# Patient Record
Sex: FEMALE | Race: WHITE | NOT HISPANIC OR LATINO | Employment: OTHER | URBAN - METROPOLITAN AREA
[De-identification: names, ages, dates, MRNs, and addresses within clinical notes are randomized per-mention and may not be internally consistent; named-entity substitution may affect disease eponyms.]

---

## 2017-07-07 ENCOUNTER — HOSPITAL ENCOUNTER (OUTPATIENT)
Dept: GENERAL RADIOLOGY | Facility: HOSPITAL | Age: 57
Discharge: HOME | End: 2017-07-07
Admitting: ORTHOPAEDIC SURGERY

## 2017-07-07 ENCOUNTER — APPOINTMENT (OUTPATIENT)
Dept: PREADMISSION TESTING | Facility: HOSPITAL | Age: 57
End: 2017-07-07

## 2017-07-07 VITALS
DIASTOLIC BLOOD PRESSURE: 68 MMHG | HEIGHT: 64 IN | TEMPERATURE: 97.5 F | HEART RATE: 63 BPM | RESPIRATION RATE: 20 BRPM | BODY MASS INDEX: 47.63 KG/M2 | WEIGHT: 279 LBS | OXYGEN SATURATION: 93 % | SYSTOLIC BLOOD PRESSURE: 110 MMHG

## 2017-07-07 LAB
ABO GROUP BLD: NORMAL
ALBUMIN SERPL-MCNC: 3.6 G/DL (ref 3.5–5.2)
ALBUMIN/GLOB SERPL: 1.3 G/DL
ALP SERPL-CCNC: 145 U/L (ref 39–117)
ALT SERPL W P-5'-P-CCNC: 22 U/L (ref 1–33)
ANION GAP SERPL CALCULATED.3IONS-SCNC: 11.1 MMOL/L
AST SERPL-CCNC: 23 U/L (ref 1–32)
BILIRUB SERPL-MCNC: 0.4 MG/DL (ref 0.1–1.2)
BILIRUB UR QL STRIP: NEGATIVE
BLD GP AB SCN SERPL QL: NEGATIVE
BUN BLD-MCNC: 16 MG/DL (ref 6–20)
BUN/CREAT SERPL: 15.8 (ref 7–25)
CALCIUM SPEC-SCNC: 9.3 MG/DL (ref 8.6–10.5)
CHLORIDE SERPL-SCNC: 102 MMOL/L (ref 98–107)
CLARITY UR: CLEAR
CO2 SERPL-SCNC: 26.9 MMOL/L (ref 22–29)
COLOR UR: YELLOW
CREAT BLD-MCNC: 1.01 MG/DL (ref 0.57–1)
DEPRECATED RDW RBC AUTO: 46.4 FL (ref 37–54)
ERYTHROCYTE [DISTWIDTH] IN BLOOD BY AUTOMATED COUNT: 14.6 % (ref 11.7–13)
GFR SERPL CREATININE-BSD FRML MDRD: 56 ML/MIN/1.73
GLOBULIN UR ELPH-MCNC: 2.8 GM/DL
GLUCOSE BLD-MCNC: 111 MG/DL (ref 65–99)
GLUCOSE UR STRIP-MCNC: NEGATIVE MG/DL
HCT VFR BLD AUTO: 38 % (ref 35.6–45.5)
HGB BLD-MCNC: 12.6 G/DL (ref 11.9–15.5)
HGB UR QL STRIP.AUTO: NEGATIVE
INR PPP: 1.1 (ref 0.9–1.1)
KETONES UR QL STRIP: NEGATIVE
LEUKOCYTE ESTERASE UR QL STRIP.AUTO: NEGATIVE
MCH RBC QN AUTO: 28.7 PG (ref 26.9–32)
MCHC RBC AUTO-ENTMCNC: 33.2 G/DL (ref 32.4–36.3)
MCV RBC AUTO: 86.6 FL (ref 80.5–98.2)
NITRITE UR QL STRIP: NEGATIVE
PH UR STRIP.AUTO: 5.5 [PH] (ref 5–8)
PLATELET # BLD AUTO: 264 10*3/MM3 (ref 140–500)
PMV BLD AUTO: 11.1 FL (ref 6–12)
POTASSIUM BLD-SCNC: 4.4 MMOL/L (ref 3.5–5.2)
PROT SERPL-MCNC: 6.4 G/DL (ref 6–8.5)
PROT UR QL STRIP: NEGATIVE
PROTHROMBIN TIME: 13.8 SECONDS (ref 11.7–14.2)
RBC # BLD AUTO: 4.39 10*6/MM3 (ref 3.9–5.2)
RH BLD: POSITIVE
SODIUM BLD-SCNC: 140 MMOL/L (ref 136–145)
SP GR UR STRIP: 1.02 (ref 1–1.03)
UROBILINOGEN UR QL STRIP: NORMAL
WBC NRBC COR # BLD: 4.68 10*3/MM3 (ref 4.5–10.7)

## 2017-07-07 PROCEDURE — 81003 URINALYSIS AUTO W/O SCOPE: CPT | Performed by: ORTHOPAEDIC SURGERY

## 2017-07-07 PROCEDURE — 36415 COLL VENOUS BLD VENIPUNCTURE: CPT

## 2017-07-07 PROCEDURE — 71020 HC CHEST PA AND LATERAL: CPT

## 2017-07-07 PROCEDURE — 86850 RBC ANTIBODY SCREEN: CPT | Performed by: ORTHOPAEDIC SURGERY

## 2017-07-07 PROCEDURE — 85027 COMPLETE CBC AUTOMATED: CPT | Performed by: ORTHOPAEDIC SURGERY

## 2017-07-07 PROCEDURE — 85610 PROTHROMBIN TIME: CPT | Performed by: ORTHOPAEDIC SURGERY

## 2017-07-07 PROCEDURE — 86900 BLOOD TYPING SEROLOGIC ABO: CPT | Performed by: ORTHOPAEDIC SURGERY

## 2017-07-07 PROCEDURE — 93005 ELECTROCARDIOGRAM TRACING: CPT

## 2017-07-07 PROCEDURE — 80053 COMPREHEN METABOLIC PANEL: CPT | Performed by: ORTHOPAEDIC SURGERY

## 2017-07-07 PROCEDURE — 93010 ELECTROCARDIOGRAM REPORT: CPT | Performed by: INTERNAL MEDICINE

## 2017-07-07 PROCEDURE — 86901 BLOOD TYPING SEROLOGIC RH(D): CPT | Performed by: ORTHOPAEDIC SURGERY

## 2017-07-07 RX ORDER — DULOXETIN HYDROCHLORIDE 60 MG/1
60 CAPSULE, DELAYED RELEASE ORAL EVERY MORNING
COMMUNITY

## 2017-07-07 RX ORDER — CHLORHEXIDINE GLUCONATE 500 MG/1
1 CLOTH TOPICAL 2 TIMES DAILY
COMMUNITY
Start: 2017-07-11 | End: 2017-07-15 | Stop reason: HOSPADM

## 2017-07-07 RX ORDER — DULOXETIN HYDROCHLORIDE 30 MG/1
30 CAPSULE, DELAYED RELEASE ORAL EVERY MORNING
COMMUNITY

## 2017-07-07 NOTE — DISCHARGE INSTRUCTIONS
Take the following medications the morning of surgery with a small sip of water:  ATENOLOL    TO BE CALLED WITH ARRIVAL TIME      General Instructions:  • Do not eat solid food after midnight the night before surgery.  • You may drink clear liquids day of surgery but must stop at least one hour before your hospital arrival time.  • It is beneficial for you to have a clear drink that contains carbohydrates the day of surgery.  We suggest a 20 ounce bottle of Gatorade or Powerade for non-diabetic patients or a 20 ounce bottle of G2 or Powerade Zero for diabetic patients. (Pediatric patients, are not advised to drink a 20 ounce carbohydrate drink)    Clear liquids are liquids you can see through.  Nothing red in color.     Plain water                               Sports drinks  Sodas                                   Gelatin (Jell-O)  Fruit juices without pulp such as white grape juice and apple juice  Popsicles that contain no fruit or yogurt  Tea or coffee (no cream or milk added)  Gatorade / Powerade  G2 / Powerade Zero    • Infants may have breast milk up to four hours before surgery.  • Infants drinking formula may drink formula up to six hours before surgery.   • Patients who avoid smoking, chewing tobacco and alcohol for 4 weeks prior to surgery have a reduced risk of post-operative complications.  Quit smoking as many days before surgery as you can.  • Do not smoke, use chewing tobacco or drink alcohol the day of surgery.   • If applicable bring your C-PAP/ BI-PAP machine.  • Bring any papers given to you in the doctor’s office.  • Wear clean comfortable clothes and socks.  • Do not wear contact lenses or make-up.  Bring a case for your glasses.   • Bring crutches or walker if applicable.  • Leave all other valuables and jewelry at home.  • The Pre-Admission Testing nurse will instruct you to bring medications if unable to obtain an accurate list in Pre-Admission Testing.        If you were given a blood bank  ID arm band remember to bring it with you the day of surgery.    Preventing a Surgical Site Infection:  • For 2 to 3 days before surgery, avoid shaving with a razor because the razor can irritate skin and make it easier to develop an infection.  • The night prior to surgery sleep in a clean bed with clean clothing.  Do not allow pets to sleep with you.  • Shower on the morning of surgery using a fresh bar of anti-bacterial soap (such as Dial) and clean washcloth.  Dry with a clean towel and dress in clean clothing.  • Ask your surgeon if you will be receiving antibiotics prior to surgery.  • Make sure you, your family, and all healthcare providers clean their hands with soap and water or an alcohol based hand  before caring for you or your wound.    Day of surgery:  Upon arrival, a Pre-op nurse and Anesthesiologist will review your health history, obtain vital signs, and answer questions you may have.  The only belongings needed at this time will be your home medications and if applicable your C-PAP/BI-PAP machine.  If you are staying overnight your family can leave the rest of your belongings in the car and bring them to your room later.  A Pre-op nurse will start an IV and you may receive medication in preparation for surgery, including something to help you relax.  Your family will be able to see you in the Pre-op area.  While you are in surgery your family should notify the waiting room  if they leave the waiting room area and provide a contact phone number.    Please be aware that surgery does come with discomfort.  We want to make every effort to control your discomfort so please discuss any uncontrolled symptoms with your nurse.   Your doctor will most likely have prescribed pain medications.      If you are going home after surgery you will receive individualized written care instructions before being discharged.  A responsible adult must drive you to and from the hospital on the day of  your surgery and stay with you for 24 hours.    If you are staying overnight following surgery, you will be transported to your hospital room following the recovery period.  [facility] has all private rooms.    If you have any questions please call Pre-Admission Testing at [phone].  Deductibles and co-payments are collected on the day of service. Please be prepared to pay the required co-pay, deductible or deposit on the day of service as defined by your plan.    2% CHLORAHEXIDINE GLUCONATE* CLOTH  Preparing or “prepping” skin before surgery can reduce the risk of infection at the surgical site. To make the process easier, [facility] has chosen disposable cloths moistened with a rinse-free, 2% Chlorhexidine Gluconate (CHG) antiseptic solution. The steps below outline the prepping process and should be carefully followed.        Use the prep cloth on the area that is circled in the diagram             Directions Night before Surgery  1) Shower using a fresh bar of anti-bacterial soap (such as Dial) and clean washcloth.  Use a clean towel to completely dry your skin.  2) Do not use any lotions, oils or creams on your skin.  3) Open the package and remove 1 cloth, wipe your skin for 30 seconds in a circular motion.  Allow to dry for 3 minutes.  4) Repeat #3 with second cloth.  5) Do not touch your eyes, ears, or mouth with the prep cloth.  6) Allow the wet prep solution to air dry.  7) Discard the prep cloth and wash your hands with soap and water.   8) Dress in clean bed clothes and sleep on fresh clean bed sheets.   9) You may experience some temporary itching after the prep.    Directions Day of Surgery  1) Repeat steps 1,2,3,4,5,6,7, and 9.   2) Dress in clean clothes before coming to the hospital.    BACTROBAN NASAL OINTMENT  There are many germs normally in your nose. Bactroban is an ointment that will help reduce these germs. Please follow these instructions for Bactroban  use:    ____Two days before surgery in the evening Date________    ____The day before surgery in the morning  Date________    ____The day before surgery in the evening              Date________    ____The day of surgery in the morning    Date________    **Squirt ½ package of Bactroban Ointment onto a cotton applicator and apply to inside of 1st nostril.  Squirt the remaining Bactroban and apply to the inside of the other nostril.

## 2017-07-11 RX ORDER — CEFAZOLIN SODIUM IN 0.9 % NACL 3 G/100 ML
3 INTRAVENOUS SOLUTION, PIGGYBACK (ML) INTRAVENOUS ONCE
Status: CANCELLED | OUTPATIENT
Start: 2017-07-12

## 2017-07-12 ENCOUNTER — ANESTHESIA (OUTPATIENT)
Dept: PERIOP | Facility: HOSPITAL | Age: 57
End: 2017-07-12

## 2017-07-12 ENCOUNTER — HOSPITAL ENCOUNTER (INPATIENT)
Facility: HOSPITAL | Age: 57
LOS: 3 days | Discharge: NURSING, REHAB OR LONG-TERM CARE | End: 2017-07-15
Attending: ORTHOPAEDIC SURGERY | Admitting: ORTHOPAEDIC SURGERY

## 2017-07-12 ENCOUNTER — ANESTHESIA EVENT (OUTPATIENT)
Dept: PERIOP | Facility: HOSPITAL | Age: 57
End: 2017-07-12

## 2017-07-12 DIAGNOSIS — R26.2 DIFFICULTY WALKING: Primary | ICD-10-CM

## 2017-07-12 PROBLEM — M17.9 OSTEOARTHRITIS OF KNEE: Status: ACTIVE | Noted: 2017-07-12

## 2017-07-12 LAB
HCT VFR BLD AUTO: 38.8 % (ref 35.6–45.5)
HGB BLD-MCNC: 12.3 G/DL (ref 11.9–15.5)

## 2017-07-12 PROCEDURE — 25010000002 CLONIDINE PER 1 MG: Performed by: ORTHOPAEDIC SURGERY

## 2017-07-12 PROCEDURE — 25010000003 CEFAZOLIN IN DEXTROSE 2-4 GM/100ML-% SOLUTION: Performed by: ORTHOPAEDIC SURGERY

## 2017-07-12 PROCEDURE — 25010000002 ROPIVACAINE PER 1 MG: Performed by: ORTHOPAEDIC SURGERY

## 2017-07-12 PROCEDURE — 25010000003 CEFAZOLIN IN DEXTROSE 2-4 GM/100ML-% SOLUTION: Performed by: NURSE ANESTHETIST, CERTIFIED REGISTERED

## 2017-07-12 PROCEDURE — 25010000002 NEOSTIGMINE PER 0.5 MG: Performed by: NURSE ANESTHETIST, CERTIFIED REGISTERED

## 2017-07-12 PROCEDURE — 85018 HEMOGLOBIN: CPT | Performed by: ORTHOPAEDIC SURGERY

## 2017-07-12 PROCEDURE — 25010000002 EPINEPHRINE PER 0.1 MG: Performed by: ORTHOPAEDIC SURGERY

## 2017-07-12 PROCEDURE — 85014 HEMATOCRIT: CPT | Performed by: ORTHOPAEDIC SURGERY

## 2017-07-12 PROCEDURE — 25010000002 MIDAZOLAM PER 1 MG: Performed by: ANESTHESIOLOGY

## 2017-07-12 PROCEDURE — 97110 THERAPEUTIC EXERCISES: CPT

## 2017-07-12 PROCEDURE — C1713 ANCHOR/SCREW BN/BN,TIS/BN: HCPCS | Performed by: ORTHOPAEDIC SURGERY

## 2017-07-12 PROCEDURE — 25010000002 FENTANYL CITRATE (PF) 100 MCG/2ML SOLUTION: Performed by: NURSE ANESTHETIST, CERTIFIED REGISTERED

## 2017-07-12 PROCEDURE — 25010000002 PROPOFOL 10 MG/ML EMULSION: Performed by: NURSE ANESTHETIST, CERTIFIED REGISTERED

## 2017-07-12 PROCEDURE — 94799 UNLISTED PULMONARY SVC/PX: CPT

## 2017-07-12 PROCEDURE — 25010000002 ONDANSETRON PER 1 MG: Performed by: NURSE ANESTHETIST, CERTIFIED REGISTERED

## 2017-07-12 PROCEDURE — 0SRD0J9 REPLACEMENT OF LEFT KNEE JOINT WITH SYNTHETIC SUBSTITUTE, CEMENTED, OPEN APPROACH: ICD-10-PCS | Performed by: ORTHOPAEDIC SURGERY

## 2017-07-12 PROCEDURE — 25010000002 HYDROMORPHONE PER 4 MG: Performed by: NURSE ANESTHETIST, CERTIFIED REGISTERED

## 2017-07-12 PROCEDURE — 25010000002 FENTANYL CITRATE (PF) 100 MCG/2ML SOLUTION: Performed by: ANESTHESIOLOGY

## 2017-07-12 PROCEDURE — 25010000002 DEXAMETHASONE PER 1 MG: Performed by: NURSE ANESTHETIST, CERTIFIED REGISTERED

## 2017-07-12 PROCEDURE — 97162 PT EVAL MOD COMPLEX 30 MIN: CPT

## 2017-07-12 PROCEDURE — 25010000002 KETOROLAC TROMETHAMINE PER 15 MG: Performed by: ORTHOPAEDIC SURGERY

## 2017-07-12 PROCEDURE — C1776 JOINT DEVICE (IMPLANTABLE): HCPCS | Performed by: ORTHOPAEDIC SURGERY

## 2017-07-12 PROCEDURE — 25010000002 PROMETHAZINE PER 50 MG: Performed by: NURSE ANESTHETIST, CERTIFIED REGISTERED

## 2017-07-12 DEVICE — IMPLANTABLE DEVICE: Type: IMPLANTABLE DEVICE | Status: FUNCTIONAL

## 2017-07-12 DEVICE — CMT BONE SIMPLEX HV FUL DOSE: Type: IMPLANTABLE DEVICE | Site: KNEE | Status: FUNCTIONAL

## 2017-07-12 DEVICE — LEGION CR HIGH FLEX XLPE SZ 3-4 10MM
Type: IMPLANTABLE DEVICE | Status: FUNCTIONAL
Brand: LEGION

## 2017-07-12 DEVICE — LEGION CRUCIATE RETAINING OXINIUM                                    FEMORAL SIZE 4 LEFT
Type: IMPLANTABLE DEVICE | Site: KNEE | Status: FUNCTIONAL
Brand: LEGION

## 2017-07-12 DEVICE — GEN II 7.5MM RESUR PAT 32MM
Type: IMPLANTABLE DEVICE | Site: KNEE | Status: FUNCTIONAL
Brand: GENESIS II

## 2017-07-12 DEVICE — GENESIS II NON-POROUS TIBIAL                                    BASEPLATE SIZE 3 LEFT
Type: IMPLANTABLE DEVICE | Site: KNEE | Status: FUNCTIONAL
Brand: GENESIS II

## 2017-07-12 RX ORDER — MIDAZOLAM HYDROCHLORIDE 1 MG/ML
1 INJECTION INTRAMUSCULAR; INTRAVENOUS
Status: DISCONTINUED | OUTPATIENT
Start: 2017-07-12 | End: 2017-07-12 | Stop reason: HOSPADM

## 2017-07-12 RX ORDER — SENNA AND DOCUSATE SODIUM 50; 8.6 MG/1; MG/1
2 TABLET, FILM COATED ORAL 2 TIMES DAILY
Status: DISCONTINUED | OUTPATIENT
Start: 2017-07-12 | End: 2017-07-15 | Stop reason: HOSPADM

## 2017-07-12 RX ORDER — SODIUM CHLORIDE, SODIUM LACTATE, POTASSIUM CHLORIDE, CALCIUM CHLORIDE 600; 310; 30; 20 MG/100ML; MG/100ML; MG/100ML; MG/100ML
9 INJECTION, SOLUTION INTRAVENOUS CONTINUOUS PRN
Status: DISCONTINUED | OUTPATIENT
Start: 2017-07-12 | End: 2017-07-12 | Stop reason: HOSPADM

## 2017-07-12 RX ORDER — LABETALOL HYDROCHLORIDE 5 MG/ML
5 INJECTION, SOLUTION INTRAVENOUS
Status: DISCONTINUED | OUTPATIENT
Start: 2017-07-12 | End: 2017-07-12 | Stop reason: HOSPADM

## 2017-07-12 RX ORDER — BISACODYL 5 MG/1
10 TABLET, DELAYED RELEASE ORAL DAILY PRN
Status: DISCONTINUED | OUTPATIENT
Start: 2017-07-12 | End: 2017-07-15 | Stop reason: HOSPADM

## 2017-07-12 RX ORDER — NALOXONE HCL 0.4 MG/ML
0.2 VIAL (ML) INJECTION AS NEEDED
Status: DISCONTINUED | OUTPATIENT
Start: 2017-07-12 | End: 2017-07-12 | Stop reason: HOSPADM

## 2017-07-12 RX ORDER — SODIUM CHLORIDE, SODIUM LACTATE, POTASSIUM CHLORIDE, CALCIUM CHLORIDE 600; 310; 30; 20 MG/100ML; MG/100ML; MG/100ML; MG/100ML
100 INJECTION, SOLUTION INTRAVENOUS CONTINUOUS
Status: DISCONTINUED | OUTPATIENT
Start: 2017-07-12 | End: 2017-07-15 | Stop reason: HOSPADM

## 2017-07-12 RX ORDER — EPHEDRINE SULFATE 50 MG/ML
5 INJECTION, SOLUTION INTRAVENOUS ONCE AS NEEDED
Status: DISCONTINUED | OUTPATIENT
Start: 2017-07-12 | End: 2017-07-12 | Stop reason: HOSPADM

## 2017-07-12 RX ORDER — ONDANSETRON 2 MG/ML
4 INJECTION INTRAMUSCULAR; INTRAVENOUS ONCE AS NEEDED
Status: COMPLETED | OUTPATIENT
Start: 2017-07-12 | End: 2017-07-12

## 2017-07-12 RX ORDER — GLYCOPYRROLATE 0.2 MG/ML
INJECTION INTRAMUSCULAR; INTRAVENOUS AS NEEDED
Status: DISCONTINUED | OUTPATIENT
Start: 2017-07-12 | End: 2017-07-12 | Stop reason: SURG

## 2017-07-12 RX ORDER — OXYCODONE AND ACETAMINOPHEN 7.5; 325 MG/1; MG/1
1 TABLET ORAL ONCE AS NEEDED
Status: DISCONTINUED | OUTPATIENT
Start: 2017-07-12 | End: 2017-07-12 | Stop reason: HOSPADM

## 2017-07-12 RX ORDER — TRANEXAMIC ACID 100 MG/ML
INJECTION, SOLUTION INTRAVENOUS AS NEEDED
Status: DISCONTINUED | OUTPATIENT
Start: 2017-07-12 | End: 2017-07-12 | Stop reason: SURG

## 2017-07-12 RX ORDER — PROMETHAZINE HYDROCHLORIDE 25 MG/ML
12.5 INJECTION, SOLUTION INTRAMUSCULAR; INTRAVENOUS ONCE AS NEEDED
Status: COMPLETED | OUTPATIENT
Start: 2017-07-12 | End: 2017-07-12

## 2017-07-12 RX ORDER — NALOXONE HCL 0.4 MG/ML
0.1 VIAL (ML) INJECTION
Status: DISCONTINUED | OUTPATIENT
Start: 2017-07-12 | End: 2017-07-15 | Stop reason: HOSPADM

## 2017-07-12 RX ORDER — OXYCODONE AND ACETAMINOPHEN 7.5; 325 MG/1; MG/1
2 TABLET ORAL EVERY 4 HOURS PRN
Status: DISCONTINUED | OUTPATIENT
Start: 2017-07-12 | End: 2017-07-15 | Stop reason: HOSPADM

## 2017-07-12 RX ORDER — PROMETHAZINE HYDROCHLORIDE 25 MG/1
12.5 TABLET ORAL ONCE AS NEEDED
Status: DISCONTINUED | OUTPATIENT
Start: 2017-07-12 | End: 2017-07-12 | Stop reason: HOSPADM

## 2017-07-12 RX ORDER — DOCUSATE SODIUM 100 MG/1
100 CAPSULE, LIQUID FILLED ORAL 2 TIMES DAILY PRN
Status: DISCONTINUED | OUTPATIENT
Start: 2017-07-12 | End: 2017-07-15 | Stop reason: HOSPADM

## 2017-07-12 RX ORDER — DULOXETIN HYDROCHLORIDE 30 MG/1
30 CAPSULE, DELAYED RELEASE ORAL DAILY
Status: DISCONTINUED | OUTPATIENT
Start: 2017-07-12 | End: 2017-07-15 | Stop reason: HOSPADM

## 2017-07-12 RX ORDER — WARFARIN SODIUM 5 MG/1
5 TABLET ORAL
Status: DISCONTINUED | OUTPATIENT
Start: 2017-07-12 | End: 2017-07-13

## 2017-07-12 RX ORDER — ONDANSETRON 2 MG/ML
4 INJECTION INTRAMUSCULAR; INTRAVENOUS EVERY 6 HOURS PRN
Status: DISCONTINUED | OUTPATIENT
Start: 2017-07-12 | End: 2017-07-15 | Stop reason: HOSPADM

## 2017-07-12 RX ORDER — DIPHENHYDRAMINE HCL 25 MG
25 CAPSULE ORAL EVERY 6 HOURS PRN
Status: DISCONTINUED | OUTPATIENT
Start: 2017-07-12 | End: 2017-07-15 | Stop reason: HOSPADM

## 2017-07-12 RX ORDER — DIPHENHYDRAMINE HYDROCHLORIDE 50 MG/ML
12.5 INJECTION INTRAMUSCULAR; INTRAVENOUS
Status: DISCONTINUED | OUTPATIENT
Start: 2017-07-12 | End: 2017-07-12 | Stop reason: HOSPADM

## 2017-07-12 RX ORDER — FENTANYL CITRATE 50 UG/ML
50 INJECTION, SOLUTION INTRAMUSCULAR; INTRAVENOUS
Status: DISCONTINUED | OUTPATIENT
Start: 2017-07-12 | End: 2017-07-12 | Stop reason: HOSPADM

## 2017-07-12 RX ORDER — DEXAMETHASONE SODIUM PHOSPHATE 10 MG/ML
INJECTION INTRAMUSCULAR; INTRAVENOUS AS NEEDED
Status: DISCONTINUED | OUTPATIENT
Start: 2017-07-12 | End: 2017-07-12 | Stop reason: SURG

## 2017-07-12 RX ORDER — HYDRALAZINE HYDROCHLORIDE 20 MG/ML
5 INJECTION INTRAMUSCULAR; INTRAVENOUS
Status: DISCONTINUED | OUTPATIENT
Start: 2017-07-12 | End: 2017-07-12 | Stop reason: HOSPADM

## 2017-07-12 RX ORDER — FAMOTIDINE 10 MG/ML
20 INJECTION, SOLUTION INTRAVENOUS ONCE
Status: COMPLETED | OUTPATIENT
Start: 2017-07-12 | End: 2017-07-12

## 2017-07-12 RX ORDER — FLUMAZENIL 0.1 MG/ML
0.2 INJECTION INTRAVENOUS AS NEEDED
Status: DISCONTINUED | OUTPATIENT
Start: 2017-07-12 | End: 2017-07-12 | Stop reason: HOSPADM

## 2017-07-12 RX ORDER — MAGNESIUM HYDROXIDE 1200 MG/15ML
LIQUID ORAL AS NEEDED
Status: DISCONTINUED | OUTPATIENT
Start: 2017-07-12 | End: 2017-07-12 | Stop reason: HOSPADM

## 2017-07-12 RX ORDER — HYDROCODONE BITARTRATE AND ACETAMINOPHEN 7.5; 325 MG/1; MG/1
1 TABLET ORAL ONCE AS NEEDED
Status: DISCONTINUED | OUTPATIENT
Start: 2017-07-12 | End: 2017-07-12 | Stop reason: HOSPADM

## 2017-07-12 RX ORDER — LEVOTHYROXINE SODIUM 175 UG/1
175 TABLET ORAL DAILY
Status: DISCONTINUED | OUTPATIENT
Start: 2017-07-12 | End: 2017-07-15 | Stop reason: HOSPADM

## 2017-07-12 RX ORDER — CEFAZOLIN SODIUM 2 G/100ML
2 INJECTION, SOLUTION INTRAVENOUS EVERY 8 HOURS
Status: COMPLETED | OUTPATIENT
Start: 2017-07-12 | End: 2017-07-13

## 2017-07-12 RX ORDER — LIDOCAINE HYDROCHLORIDE 20 MG/ML
INJECTION, SOLUTION INFILTRATION; PERINEURAL AS NEEDED
Status: DISCONTINUED | OUTPATIENT
Start: 2017-07-12 | End: 2017-07-12 | Stop reason: SURG

## 2017-07-12 RX ORDER — ATENOLOL 25 MG/1
25 TABLET ORAL DAILY
Status: DISCONTINUED | OUTPATIENT
Start: 2017-07-12 | End: 2017-07-15 | Stop reason: HOSPADM

## 2017-07-12 RX ORDER — MIDAZOLAM HYDROCHLORIDE 1 MG/ML
2 INJECTION INTRAMUSCULAR; INTRAVENOUS
Status: DISCONTINUED | OUTPATIENT
Start: 2017-07-12 | End: 2017-07-12 | Stop reason: HOSPADM

## 2017-07-12 RX ORDER — HYDROMORPHONE HYDROCHLORIDE 1 MG/ML
0.5 INJECTION, SOLUTION INTRAMUSCULAR; INTRAVENOUS; SUBCUTANEOUS
Status: DISCONTINUED | OUTPATIENT
Start: 2017-07-12 | End: 2017-07-12 | Stop reason: HOSPADM

## 2017-07-12 RX ORDER — DULOXETIN HYDROCHLORIDE 60 MG/1
60 CAPSULE, DELAYED RELEASE ORAL DAILY
Status: DISCONTINUED | OUTPATIENT
Start: 2017-07-12 | End: 2017-07-15 | Stop reason: HOSPADM

## 2017-07-12 RX ORDER — PROMETHAZINE HYDROCHLORIDE 25 MG/1
25 SUPPOSITORY RECTAL ONCE AS NEEDED
Status: COMPLETED | OUTPATIENT
Start: 2017-07-12 | End: 2017-07-12

## 2017-07-12 RX ORDER — PROMETHAZINE HYDROCHLORIDE 25 MG/1
25 TABLET ORAL ONCE AS NEEDED
Status: COMPLETED | OUTPATIENT
Start: 2017-07-12 | End: 2017-07-12

## 2017-07-12 RX ORDER — POVIDONE-IODINE 10 MG/G
OINTMENT TOPICAL AS NEEDED
Status: DISCONTINUED | OUTPATIENT
Start: 2017-07-12 | End: 2017-07-12 | Stop reason: HOSPADM

## 2017-07-12 RX ORDER — FENTANYL CITRATE 50 UG/ML
INJECTION, SOLUTION INTRAMUSCULAR; INTRAVENOUS AS NEEDED
Status: DISCONTINUED | OUTPATIENT
Start: 2017-07-12 | End: 2017-07-12 | Stop reason: SURG

## 2017-07-12 RX ORDER — CYCLOBENZAPRINE HCL 10 MG
10 TABLET ORAL NIGHTLY
COMMUNITY

## 2017-07-12 RX ORDER — ONDANSETRON 2 MG/ML
INJECTION INTRAMUSCULAR; INTRAVENOUS AS NEEDED
Status: DISCONTINUED | OUTPATIENT
Start: 2017-07-12 | End: 2017-07-12 | Stop reason: SURG

## 2017-07-12 RX ORDER — SODIUM CHLORIDE 0.9 % (FLUSH) 0.9 %
1-10 SYRINGE (ML) INJECTION AS NEEDED
Status: DISCONTINUED | OUTPATIENT
Start: 2017-07-12 | End: 2017-07-12 | Stop reason: HOSPADM

## 2017-07-12 RX ORDER — PROPOFOL 10 MG/ML
VIAL (ML) INTRAVENOUS AS NEEDED
Status: DISCONTINUED | OUTPATIENT
Start: 2017-07-12 | End: 2017-07-12 | Stop reason: SURG

## 2017-07-12 RX ORDER — ROCURONIUM BROMIDE 10 MG/ML
INJECTION, SOLUTION INTRAVENOUS AS NEEDED
Status: DISCONTINUED | OUTPATIENT
Start: 2017-07-12 | End: 2017-07-12 | Stop reason: SURG

## 2017-07-12 RX ORDER — CEFAZOLIN SODIUM 2 G/100ML
INJECTION, SOLUTION INTRAVENOUS AS NEEDED
Status: DISCONTINUED | OUTPATIENT
Start: 2017-07-12 | End: 2017-07-12 | Stop reason: SURG

## 2017-07-12 RX ORDER — GABAPENTIN 300 MG/1
300 CAPSULE ORAL NIGHTLY
COMMUNITY

## 2017-07-12 RX ORDER — HYDROMORPHONE HYDROCHLORIDE 1 MG/ML
0.5 INJECTION, SOLUTION INTRAMUSCULAR; INTRAVENOUS; SUBCUTANEOUS
Status: DISCONTINUED | OUTPATIENT
Start: 2017-07-12 | End: 2017-07-15 | Stop reason: HOSPADM

## 2017-07-12 RX ADMIN — EPINEPHRINE: 1 INJECTION PARENTERAL at 14:10

## 2017-07-12 RX ADMIN — DULOXETINE HYDROCHLORIDE 60 MG: 60 CAPSULE, DELAYED RELEASE ORAL at 14:08

## 2017-07-12 RX ADMIN — PROPOFOL 200 MG: 10 INJECTION, EMULSION INTRAVENOUS at 08:01

## 2017-07-12 RX ADMIN — PROPOFOL 50 MG: 10 INJECTION, EMULSION INTRAVENOUS at 09:01

## 2017-07-12 RX ADMIN — WARFARIN SODIUM 5 MG: 5 TABLET ORAL at 17:57

## 2017-07-12 RX ADMIN — OXYCODONE HYDROCHLORIDE AND ACETAMINOPHEN 2 TABLET: 7.5; 325 TABLET ORAL at 17:57

## 2017-07-12 RX ADMIN — FAMOTIDINE 20 MG: 10 INJECTION INTRAVENOUS at 07:15

## 2017-07-12 RX ADMIN — SODIUM CHLORIDE, POTASSIUM CHLORIDE, SODIUM LACTATE AND CALCIUM CHLORIDE: 600; 310; 30; 20 INJECTION, SOLUTION INTRAVENOUS at 07:59

## 2017-07-12 RX ADMIN — ROCURONIUM BROMIDE 40 MG: 10 INJECTION INTRAVENOUS at 08:01

## 2017-07-12 RX ADMIN — DULOXETINE HYDROCHLORIDE 30 MG: 30 CAPSULE, DELAYED RELEASE ORAL at 14:09

## 2017-07-12 RX ADMIN — ONDANSETRON 4 MG: 2 INJECTION INTRAMUSCULAR; INTRAVENOUS at 09:05

## 2017-07-12 RX ADMIN — FENTANYL CITRATE 50 MCG: 50 INJECTION INTRAMUSCULAR; INTRAVENOUS at 10:34

## 2017-07-12 RX ADMIN — LIDOCAINE HYDROCHLORIDE 60 MG: 20 INJECTION, SOLUTION INFILTRATION; PERINEURAL at 08:01

## 2017-07-12 RX ADMIN — NEOSTIGMINE METHYLSULFATE 3 MG: 1 INJECTION INTRAMUSCULAR; INTRAVENOUS; SUBCUTANEOUS at 09:29

## 2017-07-12 RX ADMIN — ONDANSETRON 4 MG: 2 INJECTION INTRAMUSCULAR; INTRAVENOUS at 10:17

## 2017-07-12 RX ADMIN — CEFAZOLIN SODIUM 3 G: 2 INJECTION, SOLUTION INTRAVENOUS at 08:06

## 2017-07-12 RX ADMIN — FENTANYL CITRATE 50 MCG: 50 INJECTION INTRAMUSCULAR; INTRAVENOUS at 07:15

## 2017-07-12 RX ADMIN — LEVOTHYROXINE SODIUM 175 MCG: 175 TABLET ORAL at 14:09

## 2017-07-12 RX ADMIN — HYDROMORPHONE HYDROCHLORIDE 0.5 MG: 1 INJECTION, SOLUTION INTRAMUSCULAR; INTRAVENOUS; SUBCUTANEOUS at 12:00

## 2017-07-12 RX ADMIN — GLYCOPYRROLATE 0.4 MG: 0.2 INJECTION INTRAMUSCULAR; INTRAVENOUS at 09:29

## 2017-07-12 RX ADMIN — FENTANYL CITRATE 50 MCG: 50 INJECTION INTRAMUSCULAR; INTRAVENOUS at 10:00

## 2017-07-12 RX ADMIN — DEXAMETHASONE SODIUM PHOSPHATE 8 MG: 10 INJECTION INTRAMUSCULAR; INTRAVENOUS at 08:21

## 2017-07-12 RX ADMIN — SODIUM CHLORIDE, POTASSIUM CHLORIDE, SODIUM LACTATE AND CALCIUM CHLORIDE: 600; 310; 30; 20 INJECTION, SOLUTION INTRAVENOUS at 09:20

## 2017-07-12 RX ADMIN — TRANEXAMIC ACID 1000 MG: 100 INJECTION, SOLUTION INTRAVENOUS at 08:48

## 2017-07-12 RX ADMIN — MIDAZOLAM 2 MG: 1 INJECTION INTRAMUSCULAR; INTRAVENOUS at 07:15

## 2017-07-12 RX ADMIN — FENTANYL CITRATE 50 MCG: 50 INJECTION, SOLUTION INTRAMUSCULAR; INTRAVENOUS at 08:59

## 2017-07-12 RX ADMIN — CEFAZOLIN SODIUM 2 G: 2 INJECTION, SOLUTION INTRAVENOUS at 23:59

## 2017-07-12 RX ADMIN — FENTANYL CITRATE 50 MCG: 50 INJECTION, SOLUTION INTRAMUSCULAR; INTRAVENOUS at 08:39

## 2017-07-12 RX ADMIN — OXYCODONE HYDROCHLORIDE AND ACETAMINOPHEN 2 TABLET: 7.5; 325 TABLET ORAL at 13:58

## 2017-07-12 RX ADMIN — DOCUSATE SODIUM -SENNOSIDES 2 TABLET: 50; 8.6 TABLET, COATED ORAL at 17:57

## 2017-07-12 RX ADMIN — OXYCODONE HYDROCHLORIDE AND ACETAMINOPHEN 2 TABLET: 7.5; 325 TABLET ORAL at 22:15

## 2017-07-12 RX ADMIN — CEFAZOLIN SODIUM 2 G: 2 INJECTION, SOLUTION INTRAVENOUS at 16:32

## 2017-07-12 RX ADMIN — FENTANYL CITRATE 100 MCG: 50 INJECTION, SOLUTION INTRAMUSCULAR; INTRAVENOUS at 08:01

## 2017-07-12 RX ADMIN — PROMETHAZINE HYDROCHLORIDE 5 MG: 25 INJECTION INTRAMUSCULAR; INTRAVENOUS at 10:59

## 2017-07-12 RX ADMIN — FENTANYL CITRATE 50 MCG: 50 INJECTION, SOLUTION INTRAMUSCULAR; INTRAVENOUS at 08:23

## 2017-07-12 NOTE — ANESTHESIA PREPROCEDURE EVALUATION
Anesthesia Evaluation     no history of anesthetic complications:         Airway   Mallampati: II  no difficulty expected  Dental - normal exam     Pulmonary - negative pulmonary ROS and normal exam   (-) COPD, asthma, sleep apnea, not a smoker    PE comment: nonlabored  Cardiovascular - negative cardio ROS and normal exam    Rhythm: regular  Rate: normal    (-) hypertension, valvular problems/murmurs, past MI, CAD, dysrhythmias, angina      Neuro/Psych  (-) seizures, TIA, CVA    ROS Comment: Spinal Cord Cysts --> back pain  GI/Hepatic/Renal/Endo    (+) morbid obesity, hypothyroidism,   (-) GERD, liver disease, diabetes, hyperthyroidism    ROS Comment: S/p gastric bypass      Musculoskeletal     (+) arthralgias, back pain,   Abdominal   (+) obese,    Substance History      OB/GYN          Other   (+) arthritis   history of cancer (breast CA)                                    Anesthesia Plan    ASA 3     general     intravenous induction   Anesthetic plan and risks discussed with patient.

## 2017-07-12 NOTE — PLAN OF CARE
Problem: Patient Care Overview (Adult)  Goal: Plan of Care Review  Outcome: Ongoing (interventions implemented as appropriate)    07/12/17 7503   Coping/Psychosocial Response Interventions   Plan Of Care Reviewed With patient   Patient Care Overview   Progress progress toward functional goals as expected   Outcome Evaluation   Outcome Summary/Follow up Plan Pt presents POD#0 L TKA and currently requires Min A sit<>stand and CGA for mbulation 50' with RW . Family reported gait better than prior to surgery aND PT W/O C/O PAIN. Will benefit from continued skilled PT to advance mobility for returen home at [facility]; has RW but needs 3-in one commode. Should be fine for return home with assist and HH PT services Will do a second walk this PM then gym tomorrow AM to address stairs.

## 2017-07-12 NOTE — PLAN OF CARE
"Problem: Patient Care Overview (Adult)  Goal: Plan of Care Review  Outcome: Ongoing (interventions implemented as appropriate)    Problem: Inpatient Physical Therapy  Goal: Bed Mobility Goal LTG- PT  Outcome: Ongoing (interventions implemented as appropriate)    07/12/17 1433   Bed Mobility PT LTG   Bed Mobility PT LTG, Date Established 07/12/17   Bed Mobility PT LTG, Time to Achieve 2 days   Bed Mobility PT LTG, Activity Type all bed mobility   Bed Mobility PT LTG, [place] Level independent       Goal: Transfer Training Goal 1 LTG- PT  Outcome: Ongoing (interventions implemented as appropriate)    07/12/17 1433   Transfer Training PT LTG   Transfer Training PT LTG, Date Established 07/12/17   Transfer Training PT LTG, Time to Achieve 3 days   Transfer Training PT LTG, Activity Type all transfers   Transfer Training PT LTG, [place] Level independent   Transfer Training PT LTG, Assist Device walker, rolling       Goal: Gait Training Goal LTG- PT  Outcome: Ongoing (interventions implemented as appropriate)    07/12/17 1433   Gait Training PT LTG   Gait Training Goal PT LTG, Date Established 07/12/17   Gait Training Goal PT LTG, Time to Achieve 3 days   Gait Training Goal PT LTG, [place] Level supervision required   Gait Training Goal PT LTG, Assist Device walker, rolling   Gait Training Goal PT LTG, Distance to Achieve 120       Goal: Stair Training Goal LTG- PT  Outcome: Ongoing (interventions implemented as appropriate)    07/12/17 1433   Stair Training PT LTG   Stair Training Goal PT LTG, Date Established 07/12/17   Stair Training Goal PT LTG, Time to Achieve 3 days   Stair Training Goal PT LTG, Number of Steps 2   Stair Training Goal PT LTG, [place] Level contact guard assist   Stair Training Goal PT LTG, Assist Device 1 handrail  (has \"handles\" to hold onto )       Goal: Range of Motion Goal LTG- PT  Outcome: Ongoing (interventions implemented as appropriate)    07/12/17 1433   Range of Motion " PT LTG   Range of Motion Goal PT LTG, Date Established 07/05/17   Range of Motion Goal PT LTG, Time to Achieve 3 days   Range fo Motion Goal PT LTG, Joint L knee   Range of Motion Goal PT LTG, AROM Measure 0-90

## 2017-07-12 NOTE — PLAN OF CARE
Problem: Patient Care Overview (Adult)  Goal: Plan of Care Review  Outcome: Ongoing (interventions implemented as appropriate)    07/12/17 1506   Coping/Psychosocial Response Interventions   Plan Of Care Reviewed With patient   Patient Care Overview   Progress progress toward functional goals as expected   Outcome Evaluation   Outcome Summary/Follow up Plan Pt improved in mobility this afternoon, S only bed mobility, CGA transfewrs and ambulation 120' with her Rollator. To gym tomorrow to work on stairs.

## 2017-07-12 NOTE — ANESTHESIA PROCEDURE NOTES
Airway  Urgency: elective    Airway not difficult    General Information and Staff    Patient location during procedure: OR  Anesthesiologist: OLGA FOSTER  CRNA: RED DAVALOS    Indications and Patient Condition  Indications for airway management: airway protection    Preoxygenated: yes  Mask difficulty assessment: 2 - vent by mask + OA or adjuvant +/- NMBA    Final Airway Details  Final airway type: endotracheal airway      Successful airway: ETT  Cuffed: yes   Successful intubation technique: direct laryngoscopy  Endotracheal tube insertion site: oral  Blade: Marino  Blade size: #2  ETT size: 7.0 mm  Cormack-Lehane Classification: grade IIa - partial view of glottis  Placement verified by: chest auscultation and capnometry   Measured from: lips  ETT to lips (cm): 21  Number of attempts at approach: 1    Additional Comments  Atraumatic, MOP to cuff, BSBE, no change to dentition, secured with tape

## 2017-07-12 NOTE — ANESTHESIA POSTPROCEDURE EVALUATION
Patient: Natalie Terrazas    Procedure Summary     Date Anesthesia Start Anesthesia Stop Room / Location    07/12/17 0759 0949  [facility] OR 11 / [facility]       Procedure Diagnosis Surgeon Provider    LT TOTAL KNEE ARTHROPLASTY (Left Knee) No diagnosis on file. MD Darrick Manzano MD          Anesthesia Type: general  Last vitals  /81 (07/12/17 1200)    Temp 36.8 °C (98.2 °F) (07/12/17 1200)    Pulse 70 (07/12/17 1200)   Resp 16 (07/12/17 1200)    SpO2 98 % (07/12/17 1200)      Post Anesthesia Care and Evaluation    Patient location during evaluation: PACU  Patient participation: complete - patient participated  Level of consciousness: awake and alert  Pain management: adequate  Airway patency: patent  Anesthetic complications: No anesthetic complications    Cardiovascular status: acceptable  Respiratory status: acceptable  Hydration status: acceptable

## 2017-07-12 NOTE — H&P
Patient Care Team:  Poonam Temple MD as PCP - General (Family Medicine)    Chief complaint left knee pain    Subjective patient has difficulty walking because of knee pain.    History of Present Illness This patient has severe arthritis of her left knee.  She has had pain for years.  The pain is progressively getting worse.  The pain now limits her ability to walk or stand for any period of time.  She has tried anti-inflammatories as well as injections with no relief of her pain.  She comes to the hospital today for a left total knee.    Review of Systems     Past Medical History:   Diagnosis Date   • Arthritis    • Disease of thyroid gland    • History of breast cancer 2013   • Left knee pain    • Spinal cord cysts    • Urinary incontinence      Past Surgical History:   Procedure Laterality Date   • BREAST LUMPECTOMY Right    • GASTRIC BYPASS       Family History   Problem Relation Age of Onset   • Malig Hyperthermia Neg Hx      Social History   Substance Use Topics   • Smoking status: Never Smoker   • Smokeless tobacco: Never Used   • Alcohol use No     Prescriptions Prior to Admission   Medication Sig Dispense Refill Last Dose   • atenolol (TENORMIN) 25 MG tablet Take 25 mg by mouth Daily.      • Chlorhexidine Gluconate Cloth 2 % pads Apply  topically. PREOP      • DULoxetine (CYMBALTA) 30 MG capsule Take 30 mg by mouth Daily.      • DULoxetine (CYMBALTA) 60 MG capsule Take 60 mg by mouth Daily.      • HYDROcodone-acetaminophen (NORCO)  MG per tablet Take 1 tablet by mouth Every 8 (Eight) Hours As Needed for Moderate Pain (4-6).      • ibuprofen (ADVIL,MOTRIN) 200 MG tablet Take 200 mg by mouth every 6 (six) hours as needed for mild pain (1-3).   7/5/2017   • levothyroxine (SYNTHROID, LEVOTHROID) 175 MCG tablet Take 175 mcg by mouth daily.      • mupirocin (BACTROBAN) 2 % nasal ointment into each nostril 2 (Two) Times a Day. PREOP        Allergies:  Naproxen and Other    Objective  patient walks with a  limp.    Vital Signs  Temp:  [98.3 °F (36.8 °C)] 98.3 °F (36.8 °C)  Heart Rate:  [64] 64  Resp:  [17] 17  BP: (141)/(78) 141/78    Physical Exam  this patient is alert and awake oriented ×3 and answers questions appropriately.    HEENT pupils are equal round and reactive to light and accommodation    Neck the neck is supple with no palpable masses    Heart.  Regular rate and rhythm with no gallops or murmurs.     Lungs.  The lungs are clear to auscultation    Abdomen.  The abdomen is soft with regular bowel sounds. No  Rebound or distention is noted.    Neuro.  Neuro exam is normal.    Vascular.  There are palpable pulses in all 4 extremities.    Orthopedic.  The left knee exam reveals a small effusion.  Ligaments are stable.  Range of motion of the left knee is full extension to flexion of 110.  No erythema is noted.                                        Results Review:   I reviewed the patient's new clinical results.      Assessment/Plan  osteoarthritis left knee and the plan is for left total knee replacement.    Active Problems:    * No active hospital problems. *      Assessment & Plan    I discussed the patients findings and my recommendations with patient    Rakesh Velasco MD  07/12/17  6:53 AM    Time:

## 2017-07-12 NOTE — ANESTHESIA PROCEDURE NOTES
Peripheral Block    Patient location during procedure: holding area  Start time: 7/12/2017 7:13 AM  Stop time: 7/12/2017 7:20 AM  Reason for block: at surgeon's request and post-op pain management  Performed by  Anesthesiologist: OLGA FOSTER  Preanesthetic Checklist  Completed: patient identified, site marked, surgical consent, pre-op evaluation, timeout performed, IV checked, risks and benefits discussed and monitors and equipment checked  Peripheral Block Prep:  Sterile barriers:cap, gloves and mask  Prep: ChloraPrep  Patient monitoring: blood pressure monitoring, continuous pulse oximetry and EKG  Peripheral Procedure  Sedation:yes  Guidance:ultrasound guided  Images:still images obtained    Laterality:left  Block Type:adductor canal block (Femoral Nerve at Adductor Canal)  Injection Technique:single-shot  Needle Type:short-bevel  Needle Gauge:21 G  ULTRASOUND INTERPRETATION.  Using ultrasound guidance a 21 G gauge needle was placed in close proximity to the femoral nerve, at which point, under ultrasound guidance anesthetic was injected in the area of the nerve and spread of the anesthesia was seen on ultrasound in close proximity thereto.  There were no abnormalities seen on ultrasound; a digital image was taken; and the patient tolerated the procedure with no complications.   Medications  Local Injected:ropivacaine 0.5% without epinephrine Local Amount Injected:30mL  Post Assessment  Injection Assessment: negative aspiration for heme, no paresthesia on injection and incremental injection  Patient Tolerance:comfortable throughout block  Complications:no

## 2017-07-12 NOTE — THERAPY TREATMENT NOTE
Acute Care - Physical Therapy Treatment Note  [facility]     Patient Name: Natalie Terrazas  : 1960  MRN: [id number]  Today's Date: 2017             Admit Date: 2017    Visit Dx:    ICD-10-CM ICD-9-CM   1. Difficulty walking R26.2 719.7     Patient Active Problem List   Diagnosis   • Osteoarthritis of knee               Adult Rehabilitation Note       17 1626          Rehab Assessment/Intervention    Discipline physical therapist  -DM      Document Type therapy note (daily note)  -DM      Subjective Information no complaints;agree to therapy  -DM      Patient Effort, Rehab Treatment good  -DM      Precautions/Limitations fall precautions  -DM      Recorded by [DM] Keerthi Garcia, PT      Pain Assessment    Pain Assessment 0-10  -DM      Pain Score 1  -DM      Pain Type Acute pain  -DM      Pain Location Knee  -DM      Pain Orientation Left  -DM      Pain Intervention(s) Ambulation/increased activity;Cold applied;MD notified (Comment);Elevated  -DM      Recorded by [DM] Keerthi Garcia, PT      Vision Assessment/Intervention    Visual Impairment WFL  -DM      Recorded by [DM] Keerthi Garcia, PT      Cognitive Assessment/Intervention    Current Cognitive/Communication Assessment functional  -DM      Orientation Status oriented x 4  -DM      Follows Commands/Answers Questions 100% of the time  -DM      Personal Safety WNL/WFL  -DM      Personal Safety Interventions fall prevention program maintained;gait belt;nonskid shoes/slippers when out of bed;supervised activity  -DM      Recorded by [DM] Keerthi Garcia, PT      Bed Mobility, Assessment/Treatment    Bed Mobility, Assistive Device bed rails  -DM      Bed Mob, Supine to Sit, [place] supervision required  -DM      Bed Mob, Sit to Supine, [place] supervision required  -DM      Recorded by [DM] Keerthi Garcia, PT      Transfer Assessment/Treatment    Transfers, Sit-Stand [place] contact guard assist  -DM      Transfers, Stand-Sit  [place] contact guard assist  -DM      Transfers, Sit-Stand-Sit, Assist Device rolling walker  -DM      Recorded by [DM] Keerthi Garcia, PT      Gait Assessment/Treatment    Gait, [place] Level contact guard assist;verbal cues required  -DM      Gait, Assistive Device rolling walker  -DM      Gait, Distance (Feet) 120  -DM      Gait, Comment [place] is PLOF  -DM      Recorded by [DM] Keerthi Garcia, PT      Positioning and Restraints    Pre-Treatment Position in bed  -DM      Post Treatment Position bed  -DM      In Bed notified nsg;fowlers;call light within reach;encouraged to call for assist;exit alarm on;LLE elevated   ice  -DM      Recorded by [DM] Keerthi Garcia, PT        User Key  (r) = Recorded By, (t) = Taken By, (c) = Cosigned By    Initials Name Effective Dates    DM Keerthi Garcia, PT 10/06/15 -                 IP PT Goals       07/12/17 1433          Bed Mobility PT LTG    Bed Mobility PT LTG, Date Established (P)  07/12/17  -DM      Bed Mobility PT LTG, Time to Achieve (P)  2 days  -DM      Bed Mobility PT LTG, Activity Type (P)  all bed mobility  -DM      Bed Mobility PT LTG, [place] Level (P)  independent  -DM      Transfer Training PT LTG    Transfer Training PT LTG, Date Established (P)  07/12/17  -DM      Transfer Training PT LTG, Time to Achieve (P)  3 days  -DM      Transfer Training PT LTG, Activity Type (P)  all transfers  -DM      Transfer Training PT LTG, [place] Level (P)  independent  -DM      Transfer Training PT LTG, Assist Device (P)  walker, rolling  -DM      Gait Training PT LTG    Gait Training Goal PT LTG, Date Established (P)  07/12/17  -DM      Gait Training Goal PT LTG, Time to Achieve (P)  3 days  -DM      Gait Training Goal PT LTG, [place] Level (P)  supervision required  -DM      Gait Training Goal PT LTG, Assist Device (P)  walker, rolling  -DM      Gait Training Goal PT LTG, Distance to Achieve (P)  120  -DM      Stair Training PT LTG    Stair  "Training Goal PT LTG, Date Established (P)  07/12/17  -DM      Stair Training Goal PT LTG, Time to Achieve (P)  3 days  -DM      Stair Training Goal PT LTG, Number of Steps (P)  2  -DM      Stair Training Goal PT LTG, [place] Level (P)  contact guard assist  -DM      Stair Training Goal PT LTG, Assist Device (P)  1 handrail   has \"handles\" to hold onto   -DM      Range of Motion PT LTG    Range of Motion Goal PT LTG, Date Established (P)  07/05/17  -DM      Range of Motion Goal PT LTG, Time to Achieve (P)  3 days  -DM      Range fo Motion Goal PT LTG, Joint (P)  L knee  -DM      Range of Motion Goal PT LTG, AROM Measure (P)  0-90  -DM        User Key  (r) = Recorded By, (t) = Taken By, (c) = Cosigned By    Initials Name Provider Type    JOE Garcia PT Physical Therapist          Physical Therapy Education     Title: PT OT SLP Therapies (Done)     Topic: Physical Therapy (Done)     Point: Mobility training (Done)    Learning Progress Summary    Learner Readiness Method Response Comment Documented by Status   Patient Acceptance E,D DU,NR  DM 07/12/17 1634 Done    Acceptance E,TB,D DU,NR  DM 07/12/17 1432 Done               Point: Home exercise program (Done)    Learning Progress Summary    Learner Readiness Method Response Comment Documented by Status   Patient Acceptance E,TB,D DU,NR  DM 07/12/17 1432 Done               Point: Body mechanics (Done)    Learning Progress Summary    Learner Readiness Method Response Comment Documented by Status   Patient Acceptance E,D DU,NR  DM 07/12/17 1634 Done    Acceptance E,TB,D DU,NR  DM 07/12/17 1432 Done               Point: Precautions (Done)    Learning Progress Summary    Learner Readiness Method Response Comment Documented by Status   Patient Acceptance E,D DU,NR  DM 07/12/17 1634 Done    Acceptance E,TB,D DU,NR  DM 07/12/17 1432 Done                      User Key     Initials Effective Dates Name Provider Type Discipline     10/06/15 -  Keerthi Garcia PT " Physical Therapist PT                    PT Recommendation and Plan  Anticipated Discharge Disposition: home with assist, home with home health  Planned Therapy Interventions: balance training, bed mobility training, gait training, home exercise program, patient/family education, ROM (Range of Motion), strengthening, stair training, transfer training  PT Frequency: 2 times/day  Plan of Care Review  Plan Of Care Reviewed With: patient  Progress: progress toward functional goals as expected  Outcome Summary/Follow up Plan: Pt improved in mobility this afternoon, S only bed mobility, CGA transfewrs and ambulation 120' with her Rollator. To gym tomorrow to work on stairs.          Outcome Measures       07/12/17 1600 07/12/17 1500       How much help from another person do you currently need...    Turning from your back to your side while in flat bed without using bedrails? 4  -DM 4  -DM     Moving from lying on back to sitting on the side of a flat bed without bedrails? 4  -DM 3  -DM     Moving to and from a bed to a chair (including a wheelchair)? 3  -DM 3  -DM     Standing up from a chair using your arms (e.g., wheelchair, bedside chair)? 3  -DM 3  -DM     Climbing 3-5 steps with a railing? 3  -DM 3  -DM     To walk in hospital room? 3  -DM 3  -DM     AM-PAC 6 Clicks Score 20  -DM 19  -DM     Functional Assessment    Outcome Measure Options AM-PAC 6 Clicks Basic Mobility (PT)  -DM AM-PAC 6 Clicks Basic Mobility (PT)  -DM       User Key  (r) = Recorded By, (t) = Taken By, (c) = Cosigned By    Initials Name Provider Type    DM Keerthi Garcia, PT Physical Therapist           Time Calculation:         PT Charges       07/12/17 1629 07/12/17 1532 07/12/17 1448    Time Calculation    Start Time 1619  -DM  1423  -DM    Stop Time 1629  -DM  1449  -DM    Time Calculation (min) 10 min  -DM  26 min  -DM    PT Received On 07/12/17  -DM  07/12/17  -DM    PT - Next Appointment 07/13/17  -DM 07/12/17  -DM 07/13/17  -DM    PT Goal  Re-Cert Due Date   07/15/17  -DM      User Key  (r) = Recorded By, (t) = Taken By, (c) = Cosigned By    Initials Name Provider Type    JOE Garcia, PT Physical Therapist          Therapy Charges for Today     Code Description Service Date Service Provider Modifiers Qty    29332074952 HC PT EVAL MOD COMPLEXITY 2 7/12/2017 Keerthi Garcia, PT GP 1    17254845004 HC PT THER SUPP EA 15 MIN 7/12/2017 Keerthi Garcia, PT GP 2    55525559670 HC PT THER PROC EA 15 MIN 7/12/2017 Keerthi Garcia, PT GP 2    56433300447 HC PT THER PROC EA 15 MIN 7/12/2017 Keerthi Garcia, PT GP 1    65997093897 HC PT THER SUPP EA 15 MIN 7/12/2017 Keerthi Garcia, PT GP 1          PT G-Codes  Outcome Measure Options: AM-PAC 6 Clicks Basic Mobility (PT)    Keerthi Garcia, PT  7/12/2017

## 2017-07-12 NOTE — THERAPY EVALUATION
Acute Care - Physical Therapy Initial Evaluation  [facility]     Patient Name: Natalie Terrazas  : 1960  MRN: [id number]  Today's Date: 2017                Admit Date: 2017     Visit Dx:    ICD-10-CM ICD-9-CM   1. Difficulty walking R26.2 719.7     Patient Active Problem List   Diagnosis   • Osteoarthritis of knee     Past Medical History:   Diagnosis Date   • Arthritis    • Disease of thyroid gland    • History of breast cancer    • Left knee pain    • Spinal cord cysts    • Urinary incontinence      Past Surgical History:   Procedure Laterality Date   • BREAST LUMPECTOMY Right    • GASTRIC BYPASS            PT ASSESSMENT (last 72 hours)      PT Evaluation       17 1423 17 0642    Rehab Evaluation    Document Type evaluation  -DM     Subjective Information no complaints;agree to therapy  -DM     Patient Effort, Rehab Treatment good  -DM     General Information    Patient Profile Review yes  -DM     General Observations supine, L knee aced, IV, O2  -DM     Pertinent History Of Current Problem POD#0 L TKA  -DM     Precautions/Limitations fall precautions  -DM     Prior Level of Function independent:  -DM     Equipment Currently Used at Home rollator   has used rollator x 6 years  -DM other (see comments)   rollator--walker  -SK    Plans/Goals Discussed With patient;agreed upon  -DM     Living Environment    Lives With  significant other   Vicente Wood  -SK    Living Arrangements  house  -SK    Home Accessibility  stairs to enter home  -SK    Number of Stairs to Enter Home  2  -SK    Stair Railings at Home  outside, present at both sides  -SK    Type of Financial/Environmental Concern  none  -SK    Transportation Available  car   pt. states she is goving to [facility] for rehab  -SK    Clinical Impression    Functional Level At Time Of Evaluation CGA/Yosef  -DM     Patient/Family Goals Statement PLOF  -DM     Criteria for Skilled Therapeutic Interventions Met yes;treatment indicated   -DM     Pathology/Pathophysiology Noted (Describe Specifically for Each System) musculoskeletal  -DM     Impairments Found (describe specific impairments) gait, locomotion, and balance  -DM     Functional Limitations in Following Categories (Describe Specific Limitations) self-care;home management;work;community/leisure;school  -DM     Rehab Potential good, to achieve stated therapy goals  -DM     Predicted Duration of Therapy Intervention (days/wks) 2 days  -DM     Pain Assessment    Pain Assessment 0-10  -DM     Pain Score 3  -DM     Pain Type Acute pain;Surgical pain  -DM     Pain Location Knee  -DM     Pain Orientation Left  -DM     Vision Assessment/Intervention    Visual Impairment WFL  -DM     Cognitive Assessment/Intervention    Current Cognitive/Communication Assessment functional  -DM     Orientation Status oriented x 4  -DM     Follows Commands/Answers Questions 100% of the time  -DM     Personal Safety WNL/WFL  -DM     Personal Safety Interventions fall prevention program maintained;gait belt;muscle strengthening facilitated;nonskid shoes/slippers when out of bed;supervised activity  -DM     ROM (Range of Motion)    General ROM Detail L knee 0-60 (aced)  -DM     MMT (Manual Muscle Testing)    General MMT Assessment no strength deficits identified  -DM     General MMT Assessment Detail generalized weaknbess noted post-op  -DM     Bed Mobility, Assessment/Treatment    Bed Mobility, Assistive Device bed rails  -DM     Bed Mob, Supine to Sit, [place] contact guard assist  -DM     Bed Mob, Sit to Supine, [place] not tested  -DM     Transfer Assessment/Treatment    Transfers, Sit-Stand [place] minimum assist (75% patient effort);verbal cues required;1 person + 1 person to manage equipment  -DM     Transfers, Stand-Sit [place] contact guard assist;verbal cues required  -DM     Transfers, Sit-Stand-Sit, Assist Device rolling walker  -DM     Transfer, Safety Issues balance decreased during  turns;step length decreased  -DM     Transfer, Impairments strength decreased;impaired balance  -DM     Gait Assessment/Treatment    Gait, [place] Level 1 person + 1 person to manage equipment;contact guard assist  -DM     Gait, Assistive Device rolling walker  -DM     Gait, Distance (Feet) 50  -DM     Gait, Safety Issues step length decreased  -DM     Gait, Impairments strength decreased;impaired balance  -DM     Gait, Comment family reported her gait is better than prior to sx  -DM     Motor Skills/Interventions    Additional Documentation Balance Skills Training (Group)  -DM     Balance Skills Training    Sitting-Level of Assistance Independent  -DM     Sitting-Balance Support Feet supported  -DM     Standing-Level of Assistance Contact guard;x2  -DM     Static Standing Balance Support assistive device  -DM     Gait Balance-Level of Assistance Contact guard  -DM     Gait Balance Support assistive device  -DM     Therapy Exercises    Exercise Protocols total knee  -DM     Total Knee Exercises left:;10 reps;completed protocol  -DM     Positioning and Restraints    Pre-Treatment Position in bed  -DM     Post Treatment Position chair  -DM     In Chair notified nsg;reclined;call light within reach;encouraged to call for assist;exit alarm on;with family/caregiver;legs elevated;LLE elevated  -DM       User Key  (r) = Recorded By, (t) = Taken By, (c) = Cosigned By    Initials Name Provider Type    DM Keerthi Garcia, PT Physical Therapist    SON Hector, RN Registered Nurse          Physical Therapy Education     Title: PT OT SLP Therapies (Done)     Topic: Physical Therapy (Done)     Point: Mobility training (Done)    Learning Progress Summary    Learner Readiness Method Response Comment Documented by Status   Patient Acceptance E,TB,D SJ,NR  DM 07/12/17 7822 Done               Point: Home exercise program (Done)    Learning Progress Summary    Learner Readiness Method Response Comment Documented by Status    Patient Acceptance E,TB,D DU,NR  DM 07/12/17 1432 Done               Point: Body mechanics (Done)    Learning Progress Summary    Learner Readiness Method Response Comment Documented by Status   Patient Acceptance E,TB,D DU,NR  DM 07/12/17 1432 Done               Point: Precautions (Done)    Learning Progress Summary    Learner Readiness Method Response Comment Documented by Status   Patient Acceptance E,TB,D DU,NR  DM 07/12/17 1432 Done                      User Key     Initials Effective Dates Name Provider Type Discipline     10/06/15 -  Keerthi Garcia, PT Physical Therapist PT                PT Recommendation and Plan  Anticipated Discharge Disposition: home with assist, home with home health  Planned Therapy Interventions: balance training, bed mobility training, gait training, home exercise program, patient/family education, ROM (Range of Motion), strengthening, stair training, transfer training  PT Frequency: 2 times/day  Plan of Care Review  Plan Of Care Reviewed With: patient  Progress: progress toward functional goals as expected  Outcome Summary/Follow up Plan: Pt presents POD#0 L TKA and currently requires Min A sit<>stand and CGA for mbulation 50' with RW . Family reported gait better than prior to surgery aND PT W/O C/O PAIN. Will benefit from continued skilled PT to advance mobility for returen home at [facility]; has RW but needs 3-in one commode. Should be fine for return home with assist and  PT services Will do a second walk this PM then gym tomorrow AM to address stauirs..g          IP PT Goals       07/12/17 1433          Bed Mobility PT LTG    Bed Mobility PT LTG, Date Established (P)  07/12/17  -DM      Bed Mobility PT LTG, Time to Achieve (P)  2 days  -DM      Bed Mobility PT LTG, Activity Type (P)  all bed mobility  -DM      Bed Mobility PT LTG, [place] Level (P)  independent  -DM      Transfer Training PT LTG    Transfer Training PT LTG, Date Established (P)  07/12/17  -DM      Transfer  "Training PT LTG, Time to Achieve (P)  3 days  -DM      Transfer Training PT LTG, Activity Type (P)  all transfers  -DM      Transfer Training PT LTG, [place] Level (P)  independent  -DM      Transfer Training PT LTG, Assist Device (P)  walker, rolling  -DM      Gait Training PT LTG    Gait Training Goal PT LTG, Date Established (P)  07/12/17  -DM      Gait Training Goal PT LTG, Time to Achieve (P)  3 days  -DM      Gait Training Goal PT LTG, [place] Level (P)  supervision required  -DM      Gait Training Goal PT LTG, Assist Device (P)  walker, rolling  -DM      Gait Training Goal PT LTG, Distance to Achieve (P)  120  -DM      Stair Training PT LTG    Stair Training Goal PT LTG, Date Established (P)  07/12/17  -DM      Stair Training Goal PT LTG, Time to Achieve (P)  3 days  -DM      Stair Training Goal PT LTG, Number of Steps (P)  2  -DM      Stair Training Goal PT LTG, [place] Level (P)  contact guard assist  -DM      Stair Training Goal PT LTG, Assist Device (P)  1 handrail   has \"handles\" to hold onto   -DM      Range of Motion PT LTG    Range of Motion Goal PT LTG, Date Established (P)  07/05/17  -DM      Range of Motion Goal PT LTG, Time to Achieve (P)  3 days  -DM      Range fo Motion Goal PT LTG, Joint (P)  L knee  -DM      Range of Motion Goal PT LTG, AROM Measure (P)  0-90  -DM        User Key  (r) = Recorded By, (t) = Taken By, (c) = Cosigned By    Initials Name Provider Type    JOE Garcia, PT Physical Therapist                Outcome Measures       07/12/17 1500          How much help from another person do you currently need...    Turning from your back to your side while in flat bed without using bedrails? 4  -DM      Moving from lying on back to sitting on the side of a flat bed without bedrails? 3  -DM      Moving to and from a bed to a chair (including a wheelchair)? 3  -DM      Standing up from a chair using your arms (e.g., wheelchair, bedside chair)? 3  -DM      Climbing 3-5 " steps with a railing? 3  -DM      To walk in hospital room? 3  -DM      AM-PAC 6 Clicks Score 19  -DM      Functional Assessment    Outcome Measure Options AM-PAC 6 Clicks Basic Mobility (PT)  -DM        User Key  (r) = Recorded By, (t) = Taken By, (c) = Cosigned By    Initials Name Provider Type    JOE Garcia PT Physical Therapist           Time Calculation:         PT Charges       07/12/17 1448          Time Calculation    Start Time 1423  -DM      Stop Time 1449  -DM      Time Calculation (min) 26 min  -DM      PT Received On 07/12/17  -DM      PT - Next Appointment 07/13/17  -DM      PT Goal Re-Cert Due Date 07/15/17  -DM        User Key  (r) = Recorded By, (t) = Taken By, (c) = Cosigned By    Initials Name Provider Type    JOE Garcia PT Physical Therapist          Therapy Charges for Today     Code Description Service Date Service Provider Modifiers Qty    55658192413 HC PT EVAL MOD COMPLEXITY 2 7/12/2017 Keerthi Garcia, PT GP 1    73002917816 HC PT THER SUPP EA 15 MIN 7/12/2017 Keerthi Garcia, PT GP 2    61132292832 HC PT THER PROC EA 15 MIN 7/12/2017 Keerthi Garcia, PT GP 2          PT G-Codes  Outcome Measure Options: AM-PAC 6 Clicks Basic Mobility (PT)      Keerthi Garcia, PT  7/12/2017

## 2017-07-12 NOTE — PROGRESS NOTES
Discharge Planning Assessment  [facility]     Patient Name: Natalie Terrazas  MRN: [id number]  Today's Date: 7/12/2017    Admit Date: 7/12/2017          Discharge Needs Assessment       07/12/17 1528    Living Environment    Lives With significant other    Living Arrangements house    Home Accessibility stairs to enter home    Number of Stairs to Enter Home 2    Stair Railings at Home outside, present at both sides    Type of Financial/Environmental Concern none    Transportation Available car;ambulance;family or friend will provide    Living Environment    Quality Of Family Relationships supportive    Able to Return to Prior Living Arrangements yes    Discharge Needs Assessment    Concerns To Be Addressed no discharge needs identified    Readmission Within The Last 30 Days no previous admission in last 30 days    Equipment Currently Used at Home rollator    Discharge Disposition still a patient            Discharge Plan       07/12/17 1529    Case Management/Social Work Plan    Plan [facility]    Patient/Family In Agreement With Plan yes    Additional Comments IMM letter signed. Facesheet verified.  Spoke with patient in room.  Introduced self and explained role.  Patient is pre-registered at [place].  Spoke with [facility] and they will have a bed on Saturday. Transfer packet in [facility].         Discharge Placement     Facility/Agency Request Status Selected? Address Phone Number Fax Number    [facility] Accepted    Yes [address] [address] [phone]                Demographic Summary       07/12/17 1527    Referral Information    Admission Type inpatient    Arrived From admitted as an inpatient    Referral Source admission list    Reason For Consult discharge planning    Primary Care Physician Information    Name Dr Poonam Temple            Functional Status       07/12/17 1527    Functional Status Current    Ambulation 3-->assistive equipment and person     Transferring 3-->assistive equipment and person    Toileting 3-->assistive equipment and person    Bathing 3-->assistive equipment and person    Dressing 0-->independent    Eating 0-->independent    Communication 0-->understands/communicates without difficulty    Swallowing (if score 2 or more for any item, consult Rehab Services) 0-->swallows foods/liquids without difficulty    Change in Functional Status Since Onset of Current Illness/Injury yes    Functional Status Prior    Eating 0-->independent    Communication 0-->understands/communicates without difficulty    Swallowing 0-->swallows foods/liquids without difficulty    Cognitive/Perceptual/Developmental    Current Mental Status/Cognitive Functioning no deficits noted            Psychosocial     None            Abuse/Neglect     None            Legal     None            Substance Abuse     None            Patient Forms     None          Maria Luisa Lawson, RN

## 2017-07-12 NOTE — PLAN OF CARE
Problem: Patient Care Overview (Adult)  Goal: Plan of Care Review  Outcome: Ongoing (interventions implemented as appropriate)    07/12/17 9930   Coping/Psychosocial Response Interventions   Plan Of Care Reviewed With patient   Patient Care Overview   Progress progress toward functional goals as expected   Outcome Evaluation   Outcome Summary/Follow up Plan VSS, PAIN WELL CONTROLLED WITH PO MEDS, AMBULATES/VOIDS PBR WITH WALKER, NO COMORBIDITIES TO REPORT       Goal: Adult Individualization and Mutuality  Outcome: Ongoing (interventions implemented as appropriate)    Problem: Perioperative Period (Adult)  Goal: Signs and Symptoms of Listed Potential Problems Will be Absent or Manageable (Perioperative Period)  Outcome: Ongoing (interventions implemented as appropriate)    Problem: Fall Risk (Adult)  Goal: Identify Related Risk Factors and Signs and Symptoms  Outcome: Ongoing (interventions implemented as appropriate)  Goal: Absence of Falls  Outcome: Ongoing (interventions implemented as appropriate)    Problem: Knee Replacement, Total (Adult)  Goal: Signs and Symptoms of Listed Potential Problems Will be Absent or Manageable (Knee Replacement, Total)  Outcome: Ongoing (interventions implemented as appropriate)

## 2017-07-13 LAB
ANION GAP SERPL CALCULATED.3IONS-SCNC: 12.9 MMOL/L
BUN BLD-MCNC: 15 MG/DL (ref 6–20)
BUN/CREAT SERPL: 14 (ref 7–25)
CALCIUM SPEC-SCNC: 8.2 MG/DL (ref 8.6–10.5)
CHLORIDE SERPL-SCNC: 98 MMOL/L (ref 98–107)
CO2 SERPL-SCNC: 23.1 MMOL/L (ref 22–29)
CREAT BLD-MCNC: 1.07 MG/DL (ref 0.57–1)
GFR SERPL CREATININE-BSD FRML MDRD: 53 ML/MIN/1.73
GLUCOSE BLD-MCNC: 103 MG/DL (ref 65–99)
HCT VFR BLD AUTO: 33.1 % (ref 35.6–45.5)
HGB BLD-MCNC: 10.4 G/DL (ref 11.9–15.5)
INR PPP: 1.41 (ref 0.9–1.1)
POTASSIUM BLD-SCNC: 4.4 MMOL/L (ref 3.5–5.2)
PROTHROMBIN TIME: 16.8 SECONDS (ref 11.7–14.2)
SODIUM BLD-SCNC: 134 MMOL/L (ref 136–145)

## 2017-07-13 PROCEDURE — 25010000002 HYDROMORPHONE PER 4 MG: Performed by: ORTHOPAEDIC SURGERY

## 2017-07-13 PROCEDURE — 85014 HEMATOCRIT: CPT | Performed by: ORTHOPAEDIC SURGERY

## 2017-07-13 PROCEDURE — 97110 THERAPEUTIC EXERCISES: CPT

## 2017-07-13 PROCEDURE — 25010000002 ONDANSETRON PER 1 MG: Performed by: ORTHOPAEDIC SURGERY

## 2017-07-13 PROCEDURE — 85018 HEMOGLOBIN: CPT | Performed by: ORTHOPAEDIC SURGERY

## 2017-07-13 PROCEDURE — 97150 GROUP THERAPEUTIC PROCEDURES: CPT

## 2017-07-13 PROCEDURE — 85610 PROTHROMBIN TIME: CPT | Performed by: ORTHOPAEDIC SURGERY

## 2017-07-13 PROCEDURE — 80048 BASIC METABOLIC PNL TOTAL CA: CPT | Performed by: ORTHOPAEDIC SURGERY

## 2017-07-13 RX ORDER — WARFARIN SODIUM 6 MG/1
6 TABLET ORAL
Status: DISCONTINUED | OUTPATIENT
Start: 2017-07-13 | End: 2017-07-15 | Stop reason: HOSPADM

## 2017-07-13 RX ADMIN — DOCUSATE SODIUM -SENNOSIDES 2 TABLET: 50; 8.6 TABLET, COATED ORAL at 09:45

## 2017-07-13 RX ADMIN — ATENOLOL 25 MG: 25 TABLET ORAL at 09:45

## 2017-07-13 RX ADMIN — LEVOTHYROXINE SODIUM 175 MCG: 175 TABLET ORAL at 09:45

## 2017-07-13 RX ADMIN — SODIUM CHLORIDE, POTASSIUM CHLORIDE, SODIUM LACTATE AND CALCIUM CHLORIDE 100 ML/HR: 600; 310; 30; 20 INJECTION, SOLUTION INTRAVENOUS at 04:28

## 2017-07-13 RX ADMIN — OXYCODONE HYDROCHLORIDE AND ACETAMINOPHEN 2 TABLET: 7.5; 325 TABLET ORAL at 03:07

## 2017-07-13 RX ADMIN — DULOXETINE HYDROCHLORIDE 60 MG: 60 CAPSULE, DELAYED RELEASE ORAL at 09:46

## 2017-07-13 RX ADMIN — OXYCODONE HYDROCHLORIDE AND ACETAMINOPHEN 2 TABLET: 7.5; 325 TABLET ORAL at 21:13

## 2017-07-13 RX ADMIN — HYDROMORPHONE HYDROCHLORIDE 0.5 MG: 1 INJECTION, SOLUTION INTRAMUSCULAR; INTRAVENOUS; SUBCUTANEOUS at 22:22

## 2017-07-13 RX ADMIN — OXYCODONE HYDROCHLORIDE AND ACETAMINOPHEN 2 TABLET: 7.5; 325 TABLET ORAL at 15:32

## 2017-07-13 RX ADMIN — OXYCODONE HYDROCHLORIDE AND ACETAMINOPHEN 2 TABLET: 7.5; 325 TABLET ORAL at 10:31

## 2017-07-13 RX ADMIN — DOCUSATE SODIUM -SENNOSIDES 2 TABLET: 50; 8.6 TABLET, COATED ORAL at 18:39

## 2017-07-13 RX ADMIN — ONDANSETRON 4 MG: 2 INJECTION INTRAMUSCULAR; INTRAVENOUS at 18:12

## 2017-07-13 RX ADMIN — WARFARIN SODIUM 6 MG: 6 TABLET ORAL at 18:39

## 2017-07-13 RX ADMIN — HYDROMORPHONE HYDROCHLORIDE 0.5 MG: 1 INJECTION, SOLUTION INTRAMUSCULAR; INTRAVENOUS; SUBCUTANEOUS at 12:05

## 2017-07-13 RX ADMIN — OXYCODONE HYDROCHLORIDE AND ACETAMINOPHEN 2 TABLET: 7.5; 325 TABLET ORAL at 07:03

## 2017-07-13 RX ADMIN — DULOXETINE HYDROCHLORIDE 30 MG: 30 CAPSULE, DELAYED RELEASE ORAL at 09:45

## 2017-07-13 NOTE — PROGRESS NOTES
Orthopedic Total Progress Note        Patient: Natalie Terrazas    Date of Admission: 7/12/2017  6:04 AM    YOB: 1960    Medical Record Number: [id number]    Attending Physician: Rakesh Velasco MD      POD # 1     Status post: ME TOTAL KNEE ARTHROPLASTY [25475] (LT TOTAL KNEE ARTHROPLASTY)      Systemic or Specific Complaints: No Complaints      Allergies   Allergen Reactions   • Naproxen Swelling   • Nickel          Current Medications:  Scheduled Meds:  atenolol 25 mg Oral Daily   DULoxetine 30 mg Oral Daily   DULoxetine 60 mg Oral Daily   levothyroxine 175 mcg Oral Daily   sennosides-docusate sodium 2 tablet Oral BID   warfarin 5 mg Oral Daily     Continuous Infusions:  lactated ringers 100 mL/hr Last Rate: 100 mL/hr (07/13/17 0428)     PRN Meds:.bisacodyl  •  diphenhydrAMINE  •  docusate sodium  •  HYDROmorphone **AND** naloxone  •  magnesium hydroxide  •  ondansetron  •  oxyCODONE-acetaminophen      Physical Exam: 56 y.o. female  General Appearance:    alert and oriented                Pain Relief: Patient reports some relief       Vitals:    07/12/17 1519 07/12/17 1952 07/12/17 2256 07/13/17 0433   BP: 106/62 109/60 100/54 108/70   BP Location: Left arm Left arm Left arm Left arm   Patient Position: Lying Lying Lying Lying   Pulse: 62 72 78 62   Resp: 18 16 16 16   Temp:  98.6 °F (37 °C) 97.4 °F (36.3 °C) 98.3 °F (36.8 °C)   TempSrc:  Oral Oral Oral   SpO2: 96% 94% 95% 92%   Weight:       Height:               Extremities:   Operative extremity neurovascular status intact. ROM intact.    Incision intact w/out signs or  symptoms of infection. No           edema, no cyanosis, no calf tenderness     Pulses:     Pulses palpable and equal bilaterally     Skin:     Skin Warm/Dry w/out ulceration, ecchymosis, rash, or   cyanosis     Activity: Mobilizing Per P.T.       Diagnostic Tests:   Admission on 07/12/2017   Component Date Value Ref Range Status   • Hemoglobin 07/12/2017 12.3  11.9 - 15.5 g/dL Final    • Hematocrit 07/12/2017 38.8  35.6 - 45.5 % Final   • Glucose 07/13/2017 103* 65 - 99 mg/dL Final   • BUN 07/13/2017 15  6 - 20 mg/dL Final   • Creatinine 07/13/2017 1.07* 0.57 - 1.00 mg/dL Final   • Sodium 07/13/2017 134* 136 - 145 mmol/L Final   • Potassium 07/13/2017 4.4  3.5 - 5.2 mmol/L Final   • Chloride 07/13/2017 98  98 - 107 mmol/L Final   • CO2 07/13/2017 23.1  22.0 - 29.0 mmol/L Final   • Calcium 07/13/2017 8.2* 8.6 - 10.5 mg/dL Final   • eGFR Non African Amer 07/13/2017 53* >60 mL/min/1.73 Final   • BUN/Creatinine Ratio 07/13/2017 14.0  7.0 - 25.0 Final   • Anion Gap 07/13/2017 12.9  mmol/L Final   • Hemoglobin 07/13/2017 10.4* 11.9 - 15.5 g/dL Final   • Hematocrit 07/13/2017 33.1* 35.6 - 45.5 % Final   • Protime 07/13/2017 16.8* 11.7 - 14.2 Seconds Final   • INR 07/13/2017 1.41* 0.90 - 1.10 Final       Xr Chest Pa & Lateral    Result Date: 7/7/2017  Narrative: PA AND LATERAL CHEST X-RAY  HISTORY: Prior breast cancer status post right mastectomy. Hypertension controlled with medication. Preop knee replacement.  TECHNIQUE: Chest x-ray consisting of PA and lateral view is provided.   COMPARISON: None.  FINDINGS: The cardiomediastinal silhouette is normal. The lungs are clear. The costophrenic sulci are dry and the bones appear normal. There is no pneumothorax.      Impression: Negative.  This report was finalized on 7/7/2017 1:54 PM by Dr. Chris Land MD.          Assessment:  Patient Active Problem List   Diagnosis   • Osteoarthritis of knee     Post-operative Pain  Immobility    Plan:    Continue Physical Therapy, increase mobility as tolerated.  Continue SCDs, Continue DVT prophalaxis.  Continue Pain management efforts  Continue Incisional care      Discharge Plan: saturday to SNF    Date: 7/13/2017   Time: 6:54 AM    Rakesh Velasco MD

## 2017-07-13 NOTE — OP NOTE
PREOPERATIVE DIAGNOSIS: Localized osteoarthritis left knee.      POSTOPERATIVE DIAGNOSIS: []Same.    PROCEDURE PERFORMED: Left total knee replacement.    ANESTHESIA:  Gen. with abductor canal block.    SURGEON:  Rakesh Velasco MD    ASSISTANT SURGEON: Endy Jaquez CFA    BLOOD LOSS: 100 cc    SPECIMEN: None    This is a 56-year-old lady with severe pain in her left knee.  She has had pain for years, progressively worse and the pain now limits her walking or standing she has tried injections and anti-inflammatories with no relief of her discomfort and her x-ray shows severe osteoarthritis.  She has subchondral sclerosis and periarticular osteophytes.  The hospital today for left total knee.    Indications brought to the holding room and given 3 g of Ancef continued postoperatively but discontinue within 23 hours a start time of surgery.  She is given an abductor canal block.  She was then brought back to the operating room and given a general anesthetic.  Tourniquet placed around the left thigh and  the leg was prepped and draped.  After this was done a straight anterior skin incision was made.  The tourniquet had been inflated to 300 cc.  The subcutaneous was dissected away and a medial arthrotomy performed.  The patella was slid to the side.  Ossified removed from the femur.  The intramedullary reamer was used and then the intramedullary guide was used to make a 6° valgus cut on the distal femur.  The femur was sized for the [employer] Ariella knee at a size 4.  The anterior posterior cutting block was applied.  Tear posterior cuts were made along with the chamfer cuts.  Bone fragments were removed and then the box small tibial cut was made using an external guide.  Once this bone had been removed the remaining meniscal fragments were debrided.  Then removed any posterior condylar osteophytes from the femur.  The posterior capsule and periosteum were injected with ropivacaine mixture.  While femur was applied  and the lug holes were made for the real component.  Trial tibia was applied with a 9 mm insert.  He came to full extension and good stability in extension.  It was noted to be slightly loose in flexion so we went to a 10 mm insert.  He was now stable in extension and flexion.  The patella was grasped with 2 towel clips and measured 23 mm thick.  It was cut smooth at 13 and 32 patella was appropriate size.  The 3 drill holes were made for the patellar component.  We then removed all the trials the drill and punch were used to prepare the tibia.  The cement was mixed and the knee was irrigated and dried.  All 3 components were cemented in place.  And this was a [employer] Ariella II knee with a size 4 femur cruciate retaining size 3 tibial baseplate and a 32 patella.  We did remove the marker ring from the patella prior to cementing it into position.  After the cement was hardened it was judged that the 10 mm insert was the appropriate thickness so this was opened and applied to the tibial tray.  The knee was irrigated with a dilute Betadine solution and bacitracin and then closed using 0 Ethibond in the arthrotomy 0 and 2-0 Vicryl in the subcutaneous and staples in the skin.  All of the ropivacaine mixture was used and the soft tissue about the knee prior to closure.  Sterile dressing applied and her general anesthetic was reversed.  First assistant Endy Jaquez was present throughout the entire case.

## 2017-07-13 NOTE — PLAN OF CARE
Problem: Patient Care Overview (Adult)  Goal: Plan of Care Review  Outcome: Ongoing (interventions implemented as appropriate)    07/13/17 0418   Coping/Psychosocial Response Interventions   Plan Of Care Reviewed With patient   Patient Care Overview   Progress progress toward functional goals as expected   Outcome Evaluation   Outcome Summary/Follow up Plan Pain controlled with pain meds. Voiding per batroom with walker. BP on the low side . Will continue to monitor.       Goal: Adult Individualization and Mutuality  Outcome: Ongoing (interventions implemented as appropriate)  Goal: Discharge Needs Assessment  Outcome: Ongoing (interventions implemented as appropriate)    Problem: Perioperative Period (Adult)  Goal: Signs and Symptoms of Listed Potential Problems Will be Absent or Manageable (Perioperative Period)  Outcome: Ongoing (interventions implemented as appropriate)    Problem: Fall Risk (Adult)  Goal: Identify Related Risk Factors and Signs and Symptoms  Outcome: Outcome(s) achieved Date Met:  07/13/17  Goal: Absence of Falls  Outcome: Ongoing (interventions implemented as appropriate)    Problem: Knee Replacement, Total (Adult)  Goal: Signs and Symptoms of Listed Potential Problems Will be Absent or Manageable (Knee Replacement, Total)  Outcome: Ongoing (interventions implemented as appropriate)

## 2017-07-13 NOTE — PLAN OF CARE
Problem: Patient Care Overview (Adult)  Goal: Plan of Care Review  Outcome: Ongoing (interventions implemented as appropriate)    07/13/17 1700   Coping/Psychosocial Response Interventions   Plan Of Care Reviewed With patient   Patient Care Overview   Progress declining   Outcome Evaluation   Outcome Summary/Follow up Plan pt unable to incr amb dist due to bleeding from incision, nsg to redress once in room

## 2017-07-13 NOTE — THERAPY TREATMENT NOTE
Acute Care - Physical Therapy Treatment Note  [facility]     Patient Name: Natalie Terrazas  : 1960  MRN: [id number]  Today's Date: 2017             Admit Date: 2017    Visit Dx:    ICD-10-CM ICD-9-CM   1. Difficulty walking R26.2 719.7     Patient Active Problem List   Diagnosis   • Osteoarthritis of knee               Adult Rehabilitation Note       17 1055 17 1626       Rehab Assessment/Intervention    Discipline physical therapy assistant  - physical therapist  -DM     Document Type therapy note (daily note)  - therapy note (daily note)  -     Subjective Information agree to therapy;complains of;weakness;fatigue;pain;swelling  - no complaints;agree to therapy  -DM     Patient Effort, Rehab Treatment  good  -DM     Precautions/Limitations fall precautions  - fall precautions  -DM     Recorded by [JM] Sofia Marino PTA [DM] Keerthi Garcia, PT     Pain Assessment    Pain Assessment 0-10  - 0-10  -DM     Pain Score 4  -JM 1  -DM     Pain Type Surgical pain  - Acute pain  -DM     Pain Location Knee  - Knee  -DM     Pain Orientation Left  -JM Left  -DM     Pain Intervention(s) Medication (See MAR);Repositioned;Cold applied  - Ambulation/increased activity;Cold applied;MD notified (Comment);Elevated  -DM     Response to Interventions whitley  -JM      Recorded by [CASTRO] Sofia Marino PTA [DM] Keerthi Garcia, PT     Vision Assessment/Intervention    Visual Impairment  WFL  -DM     Recorded by  [DM] Keerthi Garcia, PT     Cognitive Assessment/Intervention    Current Cognitive/Communication Assessment  functional  -DM     Orientation Status  oriented x 4  -DM     Follows Commands/Answers Questions  100% of the time  -DM     Personal Safety  WNL/WFL  -DM     Personal Safety Interventions  fall prevention program maintained;gait belt;nonskid shoes/slippers when out of bed;supervised activity  -DM     Recorded by  [DM] Keerthi Garcia, PT     ROM (Range of Motion)    General ROM  Detail -5-87 w/ace AROM  -JM      Recorded by [JM] Sofia Marino PTA      Bed Mobility, Assessment/Treatment    Bed Mobility, Assistive Device  bed rails  -DM     Bed Mob, Supine to Sit, [place]  supervision required  -DM     Bed Mob, Sit to Supine, [place]  supervision required  -DM     Bed Mobility, Comment in chair  -JM      Recorded by [JM] Sofia Marino PTA [DM] Keerthi Garcia, PT     Transfer Assessment/Treatment    Transfers, Sit-Stand [place] minimum assist (75% patient effort);moderate assist (50% patient effort)  -JM contact guard assist  -DM     Transfers, Stand-Sit [place] contact guard assist;verbal cues required  -JM contact guard assist  -DM     Transfers, Sit-Stand-Sit, Assist Device rolling walker   her rollator  -JM rolling walker  -DM     Transfer, Impairments strength decreased;impaired balance  -JM      Recorded by [JM] Sofia Marino PTA [DM] Keerthi Garcia, PT     Gait Assessment/Treatment    Gait, [place] Level contact guard assist;verbal cues required  -JM contact guard assist;verbal cues required  -DM     Gait, Assistive Device rolling walker  -JM rolling walker  -DM     Gait, Distance (Feet) 20  -  -DM     Gait, Comment increased bleeding limiting dist  -JM [place] is PLOF  -DM     Recorded by [JM] Sofia Marino PTA [DM] Keerthi Garcia, PT     Therapy Exercises    Exercise Protocols total knee  -JM      Total Knee Exercises left:;15 reps;completed protocol;with assist;SLR  -JM      Recorded by [JM] Sofia Marino PTA      Positioning and Restraints    Pre-Treatment Position sitting in chair/recliner  -JM in bed  -DM     Post Treatment Position  bed  -DM     In Bed  notified nsg;fowlers;call light within reach;encouraged to call for assist;exit alarm on;LLE elevated   ice  -DM     In Chair reclined;call light within reach;encouraged to call for assist;with family/caregiver  -JM      Recorded by [CASTRO] Sofia Marino PTA [DM] Keerthi Garcia, PT   "     User Key  (r) = Recorded By, (t) = Taken By, (c) = Cosigned By    Initials Name Effective Dates    DM Keerthi Jose, PT 10/06/15 -     JM Sofia Marino, PTA 02/18/16 -                 IP PT Goals       07/12/17 1433          Bed Mobility PT LTG    Bed Mobility PT LTG, Date Established (P)  07/12/17  -DM      Bed Mobility PT LTG, Time to Achieve (P)  2 days  -DM      Bed Mobility PT LTG, Activity Type (P)  all bed mobility  -DM      Bed Mobility PT LTG, [place] Level (P)  independent  -DM      Transfer Training PT LTG    Transfer Training PT LTG, Date Established (P)  07/12/17  -DM      Transfer Training PT LTG, Time to Achieve (P)  3 days  -DM      Transfer Training PT LTG, Activity Type (P)  all transfers  -DM      Transfer Training PT LTG, [place] Level (P)  independent  -DM      Transfer Training PT LTG, Assist Device (P)  walker, rolling  -DM      Gait Training PT LTG    Gait Training Goal PT LTG, Date Established (P)  07/12/17  -DM      Gait Training Goal PT LTG, Time to Achieve (P)  3 days  -DM      Gait Training Goal PT LTG, [place] Level (P)  supervision required  -DM      Gait Training Goal PT LTG, Assist Device (P)  walker, rolling  -DM      Gait Training Goal PT LTG, Distance to Achieve (P)  120  -DM      Stair Training PT LTG    Stair Training Goal PT LTG, Date Established (P)  07/12/17  -DM      Stair Training Goal PT LTG, Time to Achieve (P)  3 days  -DM      Stair Training Goal PT LTG, Number of Steps (P)  2  -DM      Stair Training Goal PT LTG, [place] Level (P)  contact guard assist  -DM      Stair Training Goal PT LTG, Assist Device (P)  1 handrail   has \"handles\" to hold onto   -DM      Range of Motion PT LTG    Range of Motion Goal PT LTG, Date Established (P)  07/05/17  -DM      Range of Motion Goal PT LTG, Time to Achieve (P)  3 days  -DM      Range fo Motion Goal PT LTG, Joint (P)  L knee  -DM      Range of Motion Goal PT LTG, AROM Measure (P)  0-90  -DM        User " Key  (r) = Recorded By, (t) = Taken By, (c) = Cosigned By    Initials Name Provider Type    DM Keerthi Garcia, PT Physical Therapist          Physical Therapy Education     Title: PT OT SLP Therapies (Done)     Topic: Physical Therapy (Done)     Point: Mobility training (Done)    Learning Progress Summary    Learner Readiness Method Response Comment Documented by Status   Patient Acceptance E,TB,D NR,VU   07/13/17 1648 Done    Eager E,TB [facility] 07/12/17 1751 Done    Acceptance E,D DU,NR   07/12/17 1634 Done    Acceptance E,TB,D DU,NR   07/12/17 1432 Done               Point: Home exercise program (Done)    Learning Progress Summary    Learner Readiness Method Response Comment Documented by Status   Patient Acceptance E,TB,D NR,[facility] 07/13/17 1648 Done    Eager E,TB [facility] 07/12/17 1751 Done    Acceptance E,TB,D DU,NR   07/12/17 1432 Done               Point: Body mechanics (Done)    Learning Progress Summary    Learner Readiness Method Response Comment Documented by Status   Patient Acceptance E,TB,D NR,VU   07/13/17 1648 Done    Eager E,TB [facility] 07/12/17 1751 Done    Acceptance E,D DU,NR   07/12/17 1634 Done    Acceptance E,TB,D DU,NR   07/12/17 1432 Done               Point: Precautions (Done)    Learning Progress Summary    Learner Readiness Method Response Comment Documented by Status   Patient Acceptance E,TB,D NR,VU   07/13/17 1648 Done    Eager E,TB [facility] 07/12/17 1751 Done    Acceptance E,D DU,NR   07/12/17 1634 Done    Acceptance E,TB,D DU,NR   07/12/17 1432 Done                      User Key     Initials Effective Dates Name Provider Type Discipline     10/06/15 -  Keerthi Garcia, PT Physical Therapist PT    CASTRO 02/18/16 -  Sofia Marino PTA Physical Therapy Assistant PT     05/11/17 -  Shanta Cespedes, RN Extern Registered Nurse Nurse                    PT Recommendation and Plan  Anticipated Discharge Disposition: home with assist, home with home health  Planned Therapy  Interventions: balance training, bed mobility training, gait training, home exercise program, patient/family education, ROM (Range of Motion), strengthening, stair training, transfer training  PT Frequency: 2 times/day  Plan of Care Review  Plan Of Care Reviewed With: patient  Progress: declining  Outcome Summary/Follow up Plan: pt unable to incr amb dist due to bleeding from incision, nsg to redress once in room          Outcome Measures       07/13/17 1700 07/12/17 1600 07/12/17 1500    How much help from another person do you currently need...    Turning from your back to your side while in flat bed without using bedrails? 3  -JM 4  -DM 4  -DM    Moving from lying on back to sitting on the side of a flat bed without bedrails? 2  -JM 4  -DM 3  -DM    Moving to and from a bed to a chair (including a wheelchair)? 3  -JM 3  -DM 3  -DM    Standing up from a chair using your arms (e.g., wheelchair, bedside chair)? 2  -JM 3  -DM 3  -DM    Climbing 3-5 steps with a railing? 1  -JM 3  -DM 3  -DM    To walk in hospital room? 3  -JM 3  -DM 3  -DM    AM-PAC 6 Clicks Score 14  -JM 20  -DM 19  -DM    Functional Assessment    Outcome Measure Options  AM-PAC 6 Clicks Basic Mobility (PT)  -DM AM-PAC 6 Clicks Basic Mobility (PT)  -DM      User Key  (r) = Recorded By, (t) = Taken By, (c) = Cosigned By    Initials Name Provider Type    JOE Garcia, PT Physical Therapist    CASTRO Marino PTA Physical Therapy Assistant           Time Calculation:         PT Charges       07/13/17 1705          Time Calculation    Start Time 1049  -      Stop Time 1150  -      Time Calculation (min) 61 min  -CASTRO      PT Received On 07/13/17  -CASTRO      PT - Next Appointment 07/13/17  -CASTRO        User Key  (r) = Recorded By, (t) = Taken By, (c) = Cosigned By    Initials Name Provider Type    CASTRO Marino PTA Physical Therapy Assistant          Therapy Charges for Today     Code Description Service Date Service Provider Modifiers Qty     [id number]  PT THER PROC EA 15 MIN 7/13/2017 Sofia Marino PTA GP 1    [id number] HC PT THER PROC GROUP 7/13/2017 Sofia Marino PTA GP 1          PT G-Codes  Outcome Measure Options: AM-PAC 6 Clicks Basic Mobility (PT)    Sofia Marino PTA  7/13/2017

## 2017-07-13 NOTE — THERAPY TREATMENT NOTE
Acute Care - Physical Therapy Treatment Note  [facility]     Patient Name: Natalie Terrazas  : 1960  MRN: [id number]  Today's Date: 2017             Admit Date: 2017    Visit Dx:    ICD-10-CM ICD-9-CM   1. Difficulty walking R26.2 719.7     Patient Active Problem List   Diagnosis   • Osteoarthritis of knee               Adult Rehabilitation Note       17 1645 17 1055 17 1626    Rehab Assessment/Intervention    Discipline physical therapy assistant  - physical therapy assistant  - physical therapist  -    Document Type therapy note (daily note)  - therapy note (daily note)  - therapy note (daily note)  -    Subjective Information agree to therapy;complains of;weakness;fatigue;pain  - agree to therapy;complains of;weakness;fatigue;pain;swelling  - no complaints;agree to therapy  -DM    Patient Effort, Rehab Treatment   good  -DM    Precautions/Limitations fall precautions  - fall precautions  - fall precautions  -DM    Recorded by [JM] Sofia Marino PTA [JM] Sofia Marino PTA [DM] Keerthi Garcia, PT    Pain Assessment    Pain Assessment 0-10  -JM 0-10  -JM 0-10  -DM    Pain Score 8  -JM 4  -JM 1  -DM    Pain Type Surgical pain  - Surgical pain  - Acute pain  -DM    Pain Location Knee  - Knee  -JM Knee  -DM    Pain Orientation Left  -JM Left  -JM Left  -DM    Pain Intervention(s) Medication (See MAR);Repositioned;Cold applied  - Medication (See MAR);Repositioned;Cold applied  - Ambulation/increased activity;Cold applied;MD notified (Comment);Elevated  -DM    Response to Interventions whitley, unchanged  -JM whitley  -JM     Recorded by [JM] Sofia Marino PTA [JM] Sofia Marino PTA [DM] Keerthi Garcia, PT    Vision Assessment/Intervention    Visual Impairment   WFL  -DM    Recorded by   [JOE] Keerthi Garcia, PT    Cognitive Assessment/Intervention    Current Cognitive/Communication Assessment   functional  -DM    Orientation Status   oriented x 4  -DM     Follows Commands/Answers Questions   100% of the time  -DM    Personal Safety   WNL/WFL  -DM    Personal Safety Interventions   fall prevention program maintained;gait belt;nonskid shoes/slippers when out of bed;supervised activity  -DM    Recorded by   [DM] Keerthi Garcia, PT    ROM (Range of Motion)    General ROM Detail  -5-87 w/ace AROM  -JM     Recorded by  [JM] Sofia Marino PTA     Bed Mobility, Assessment/Treatment    Bed Mobility, Assistive Device   bed rails  -DM    Bed Mob, Supine to Sit, [place]   supervision required  -DM    Bed Mob, Sit to Supine, [place]   supervision required  -DM    Bed Mobility, Comment  in chair  -     Recorded by  [JM] Sofia Marino PTA [DM] Keerthi Garcia, PT    Transfer Assessment/Treatment    Transfers, Sit-Stand [place] minimum assist (75% patient effort);moderate assist (50% patient effort)  - minimum assist (75% patient effort);moderate assist (50% patient effort)  - contact guard assist  -DM    Transfers, Stand-Sit [place] contact guard assist;verbal cues required  - contact guard assist;verbal cues required  - contact guard assist  -DM    Transfers, Sit-Stand-Sit, Assist Device rolling walker   her rollator  -JM rolling walker   her rollator  -JM rolling walker  -DM    Transfer, Impairments strength decreased;impaired balance  - strength decreased;impaired balance  -     Transfer, Comment 2 attempts to stand  -      Recorded by [JM] Sofia Marino PTA [JM] Sofia Marino PTA [DM] Keerthi Garcia, PT    Gait Assessment/Treatment    Gait, [place] Level contact guard assist;verbal cues required  - contact guard assist;verbal cues required  - contact guard assist;verbal cues required  -    Gait, Assistive Device rollator  -JM rollator  -JM rolling walker  -DM    Gait, Distance (Feet) 35  -JM 20  -  -DM    Gait, Comment pain /fatigue limiting  -JM increased bleeding limiting dist  -JM [place] is PLOF  -DM     Recorded by [JM] Sofia Marino PTA [JM] Sofia Marino PTA [DM] Keerthi Garcia, PT    Therapy Exercises    Exercise Protocols total knee  -JM total knee  -JM     Total Knee Exercises left:;completed protocol;with assist;SLR;20 reps   to initiate  -JM left:;15 reps;completed protocol;with assist;SLR  -JM     Recorded by [CASTRO] Sofia Marino PTA [JM] Sofia Marino PTA     Positioning and Restraints    Pre-Treatment Position sitting in chair/recliner  -JM sitting in chair/recliner  -JM in bed  -DM    Post Treatment Position   bed  -DM    In Bed   notified nsg;fowlers;call light within reach;encouraged to call for assist;exit alarm on;LLE elevated   ice  -DM    In Chair reclined;call light within reach;encouraged to call for assist;notified nsg   nsg to assist to BR  -JM reclined;call light within reach;encouraged to call for assist;with family/caregiver  -JM     Recorded by [CASTRO] Sofia Marino PTA [JM] Sofia Marino PTA [DM] Keerthi Garcia, PT      User Key  (r) = Recorded By, (t) = Taken By, (c) = Cosigned By    Initials Name Effective Dates    DM Keerthi Garcia, PT 10/06/15 -     CASTRO Marino PTA 02/18/16 -                 IP PT Goals       07/12/17 1433          Bed Mobility PT LTG    Bed Mobility PT LTG, Date Established (P)  07/12/17  -DM      Bed Mobility PT LTG, Time to Achieve (P)  2 days  -DM      Bed Mobility PT LTG, Activity Type (P)  all bed mobility  -DM      Bed Mobility PT LTG, [place] Level (P)  independent  -DM      Transfer Training PT LTG    Transfer Training PT LTG, Date Established (P)  07/12/17  -DM      Transfer Training PT LTG, Time to Achieve (P)  3 days  -DM      Transfer Training PT LTG, Activity Type (P)  all transfers  -DM      Transfer Training PT LTG, [place] Level (P)  independent  -DM      Transfer Training PT LTG, Assist Device (P)  walker, rolling  -DM      Gait Training PT LTG    Gait Training Goal PT LTG, Date Established (P)  07/12/17  -DM      Gait  "Training Goal PT LTG, Time to Achieve (P)  3 days  -DM      Gait Training Goal PT LTG, [place] Level (P)  supervision required  -DM      Gait Training Goal PT LTG, Assist Device (P)  walker, rolling  -DM      Gait Training Goal PT LTG, Distance to Achieve (P)  120  -DM      Stair Training PT LTG    Stair Training Goal PT LTG, Date Established (P)  07/12/17  -DM      Stair Training Goal PT LTG, Time to Achieve (P)  3 days  -DM      Stair Training Goal PT LTG, Number of Steps (P)  2  -DM      Stair Training Goal PT LTG, [place] Level (P)  contact guard assist  -DM      Stair Training Goal PT LTG, Assist Device (P)  1 handrail   has \"handles\" to hold onto   -DM      Range of Motion PT LTG    Range of Motion Goal PT LTG, Date Established (P)  07/05/17  -DM      Range of Motion Goal PT LTG, Time to Achieve (P)  3 days  -DM      Range fo Motion Goal PT LTG, Joint (P)  L knee  -DM      Range of Motion Goal PT LTG, AROM Measure (P)  0-90  -DM        User Key  (r) = Recorded By, (t) = Taken By, (c) = Cosigned By    Initials Name Provider Type    JOE Garcia, PT Physical Therapist          Physical Therapy Education     Title: PT OT SLP Therapies (Done)     Topic: Physical Therapy (Done)     Point: Mobility training (Done)    Learning Progress Summary    Learner Readiness Method Response Comment Documented by Status   Patient Acceptance E,TB,D SAMEER KIRKPATRICK 07/13/17 1648 Done    Eager MAGI VITAL 07/12/17 1751 Done    Acceptance E,D DU,NR   07/12/17 1634 Done    Acceptance E,TB,D DU,NR   07/12/17 1432 Done               Point: Home exercise program (Done)    Learning Progress Summary    Learner Readiness Method Response Comment Documented by Status   Patient Acceptance E,TB,D SAMEER KIRKPATRICK 07/13/17 1648 Done    Eager MAGI VITAL 07/12/17 1751 Done    Acceptance E,TB,D DU,NR   07/12/17 1432 Done               Point: Body mechanics (Done)    Learning Progress Summary    Learner Readiness Method Response Comment " Documented by Status   Patient Acceptance E,TB,D NR,VU   07/13/17 1648 Done    Eager E,TB [facility] 07/12/17 1751 Done    Acceptance E,D DU,NR   07/12/17 1634 Done    Acceptance E,TB,D DU,NR  DM 07/12/17 1432 Done               Point: Precautions (Done)    Learning Progress Summary    Learner Readiness Method Response Comment Documented by Status   Patient Acceptance E,TB,D NR,VU   07/13/17 1648 Done    Eager E,TB VU   07/12/17 1751 Done    Acceptance E,D DU,NR   07/12/17 1634 Done    Acceptance E,TB,D DU,NR   07/12/17 1432 Done                      User Key     Initials Effective Dates Name Provider Type Discipline     10/06/15 -  Keerthi Garcia, PT Physical Therapist PT     02/18/16 -  Sofia Marino, NICOLAS Physical Therapy Assistant PT     05/11/17 -  Shanta Cespedes, RN Extern Registered Nurse Nurse                    PT Recommendation and Plan  Anticipated Discharge Disposition: home with assist, home with home health  Planned Therapy Interventions: balance training, bed mobility training, gait training, home exercise program, patient/family education, ROM (Range of Motion), strengthening, stair training, transfer training  PT Frequency: 2 times/day  Plan of Care Review  Plan Of Care Reviewed With: patient  Progress: declining  Outcome Summary/Follow up Plan: pt unable to incr amb dist due to bleeding from incision, nsg to redress once in room          Outcome Measures       07/13/17 1700 07/12/17 1600 07/12/17 1500    How much help from another person do you currently need...    Turning from your back to your side while in flat bed without using bedrails? 3  -JM 4  -DM 4  -DM    Moving from lying on back to sitting on the side of a flat bed without bedrails? 2  -JM 4  -DM 3  -DM    Moving to and from a bed to a chair (including a wheelchair)? 3  -JM 3  -DM 3  -DM    Standing up from a chair using your arms (e.g., wheelchair, bedside chair)? 2  -JM 3  -DM 3  -DM    Climbing 3-5 steps with a  railing? 1  -JM 3  -DM 3  -DM    To walk in hospital room? 3  -JM 3  -DM 3  -DM    AM-PAC 6 Clicks Score 14  -JM 20  -DM 19  -DM    Functional Assessment    Outcome Measure Options  AM-PAC 6 Clicks Basic Mobility (PT)  -DM AM-PAC 6 Clicks Basic Mobility (PT)  -DM      User Key  (r) = Recorded By, (t) = Taken By, (c) = Cosigned By    Initials Name Provider Type    JOE Garcia, PT Physical Therapist    CASTRO Marino PTA Physical Therapy Assistant           Time Calculation:         PT Charges       07/13/17 1709 07/13/17 1705       Time Calculation    Start Time 1415  - 1049  -     Stop Time 1530  - 1150  -     Time Calculation (min) 75 min  - 61 min  -CASTRO     PT Received On 07/13/17  - 07/13/17  -CASTRO     PT - Next Appointment 07/14/17  - 07/13/17  -CASTRO       User Key  (r) = Recorded By, (t) = Taken By, (c) = Cosigned By    Initials Name Provider Type    CASTRO Marino PTA Physical Therapy Assistant          Therapy Charges for Today     Code Description Service Date Service Provider Modifiers Qty    98544760941 HC PT THER PROC EA 15 MIN 7/13/2017 Sofia Marino PTA GP 1    07997830628 HC PT THER PROC GROUP 7/13/2017 Sofia Marino PTA GP 1    86134804481 HC PT THER PROC EA 15 MIN 7/13/2017 Sofia Marino PTA GP 2    27806503350 HC PT THER PROC GROUP 7/13/2017 Sofia Marino PTA GP 1          PT G-Codes  Outcome Measure Options: AM-PAC 6 Clicks Basic Mobility (PT)    Sofia Marino PTA  7/13/2017

## 2017-07-14 LAB
HCT VFR BLD AUTO: 34 % (ref 35.6–45.5)
HGB BLD-MCNC: 10.9 G/DL (ref 11.9–15.5)
INR PPP: 1.58 (ref 0.9–1.1)
PROTHROMBIN TIME: 18.3 SECONDS (ref 11.7–14.2)

## 2017-07-14 PROCEDURE — 97150 GROUP THERAPEUTIC PROCEDURES: CPT

## 2017-07-14 PROCEDURE — 85610 PROTHROMBIN TIME: CPT | Performed by: ORTHOPAEDIC SURGERY

## 2017-07-14 PROCEDURE — 25010000002 ONDANSETRON PER 1 MG: Performed by: ORTHOPAEDIC SURGERY

## 2017-07-14 PROCEDURE — 85014 HEMATOCRIT: CPT | Performed by: ORTHOPAEDIC SURGERY

## 2017-07-14 PROCEDURE — 97110 THERAPEUTIC EXERCISES: CPT

## 2017-07-14 PROCEDURE — 85018 HEMOGLOBIN: CPT | Performed by: ORTHOPAEDIC SURGERY

## 2017-07-14 RX ORDER — WARFARIN SODIUM 7.5 MG/1
7.5 TABLET ORAL
Status: COMPLETED | OUTPATIENT
Start: 2017-07-14 | End: 2017-07-14

## 2017-07-14 RX ADMIN — DOCUSATE SODIUM -SENNOSIDES 2 TABLET: 50; 8.6 TABLET, COATED ORAL at 17:58

## 2017-07-14 RX ADMIN — DULOXETINE HYDROCHLORIDE 30 MG: 30 CAPSULE, DELAYED RELEASE ORAL at 09:11

## 2017-07-14 RX ADMIN — OXYCODONE HYDROCHLORIDE AND ACETAMINOPHEN 2 TABLET: 7.5; 325 TABLET ORAL at 09:06

## 2017-07-14 RX ADMIN — ONDANSETRON 4 MG: 2 INJECTION INTRAMUSCULAR; INTRAVENOUS at 01:37

## 2017-07-14 RX ADMIN — OXYCODONE HYDROCHLORIDE AND ACETAMINOPHEN 2 TABLET: 7.5; 325 TABLET ORAL at 01:09

## 2017-07-14 RX ADMIN — MAGNESIUM HYDROXIDE 10 ML: 2400 SUSPENSION ORAL at 12:54

## 2017-07-14 RX ADMIN — OXYCODONE HYDROCHLORIDE AND ACETAMINOPHEN 2 TABLET: 7.5; 325 TABLET ORAL at 21:10

## 2017-07-14 RX ADMIN — OXYCODONE HYDROCHLORIDE AND ACETAMINOPHEN 2 TABLET: 7.5; 325 TABLET ORAL at 05:21

## 2017-07-14 RX ADMIN — DOCUSATE SODIUM -SENNOSIDES 2 TABLET: 50; 8.6 TABLET, COATED ORAL at 09:09

## 2017-07-14 RX ADMIN — LEVOTHYROXINE SODIUM 175 MCG: 175 TABLET ORAL at 09:09

## 2017-07-14 RX ADMIN — OXYCODONE HYDROCHLORIDE AND ACETAMINOPHEN 2 TABLET: 7.5; 325 TABLET ORAL at 16:52

## 2017-07-14 RX ADMIN — DULOXETINE HYDROCHLORIDE 60 MG: 60 CAPSULE, DELAYED RELEASE ORAL at 09:09

## 2017-07-14 RX ADMIN — ATENOLOL 25 MG: 25 TABLET ORAL at 09:11

## 2017-07-14 RX ADMIN — OXYCODONE HYDROCHLORIDE AND ACETAMINOPHEN 2 TABLET: 7.5; 325 TABLET ORAL at 12:54

## 2017-07-14 RX ADMIN — WARFARIN SODIUM 7.5 MG: 7.5 TABLET ORAL at 18:04

## 2017-07-14 NOTE — PLAN OF CARE
Problem: Patient Care Overview (Adult)  Goal: Plan of Care Review  Outcome: Ongoing (interventions implemented as appropriate)    07/13/17 1800   Coping/Psychosocial Response Interventions   Plan Of Care Reviewed With patient   Patient Care Overview   Progress progress toward functional goals as expected   Outcome Evaluation   Outcome Summary/Follow up Plan Required Dilaudid IV X 1 for break through pain - Otherwise Pain controlled during shift with PO pain meds. Dressing on left knee changed X 1 due to bloody drainage. Pressure dressing applied X 1. Pt with history of high blood pressure. Discussed discontinuation of BP meds due to current low BP. Pt aware these meds will probably be resumed at D/C as patient was taking prior to admission for hypertension. Episode of nausea - Zofran given IV with relief received - Pt. asleep when follow-up rounds done. IV fluids discontinued. Saline lock in place. Plans to go to a rehab center on Saturday.         Problem: Fall Risk (Adult)  Goal: Absence of Falls  Outcome: Ongoing (interventions implemented as appropriate)    Problem: Knee Replacement, Total (Adult)  Goal: Signs and Symptoms of Listed Potential Problems Will be Absent or Manageable (Knee Replacement, Total)  Outcome: Ongoing (interventions implemented as appropriate)

## 2017-07-14 NOTE — PLAN OF CARE
Problem: Patient Care Overview (Adult)  Goal: Plan of Care Review  Outcome: Ongoing (interventions implemented as appropriate)    07/14/17 0356   Coping/Psychosocial Response Interventions   Plan Of Care Reviewed With patient   Patient Care Overview   Progress improving   Outcome Evaluation   Outcome Summary/Follow up Plan IV dilaudid for breakthru pain x 1, zofran x 1 for nausea, voiding per BRP, ambulating at increased distances, to DC to rehab on saturday, educated on the importance of BP monitoring at home       Goal: Adult Individualization and Mutuality  Outcome: Ongoing (interventions implemented as appropriate)    07/14/17 0356   Individualization   Patient Specific Preferences none stated   Patient Specific Goals pain control and improved mobiltiy   Patient Specific Interventions offer PRN pain meds in a timely manner       Goal: Discharge Needs Assessment  Outcome: Ongoing (interventions implemented as appropriate)    07/14/17 0356   Discharge Needs Assessment   Concerns To Be Addressed basic needs concerns   Readmission Within The Last 30 Days no previous admission in last 30 days   Equipment Needed After Discharge raised toilet;wound care supplies;rollator   Discharge Facility/Level Of Care Needs home with home health   Discharge Disposition still a patient   Current Health   Anticipated Changes Related to Illness inability to care for self   Self-Care   Equipment Currently Used at Home rollator   Living Environment   Transportation Available car;family or friend will provide         Problem: Perioperative Period (Adult)  Goal: Signs and Symptoms of Listed Potential Problems Will be Absent or Manageable (Perioperative Period)  Outcome: Ongoing (interventions implemented as appropriate)    07/14/17 0356   Perioperative Period   Problems Assessed (Perioperative Period) all   Problems Present (Perioperative Period) pain;situational response         Problem: Fall Risk (Adult)  Goal: Absence of  Falls  Outcome: Ongoing (interventions implemented as appropriate)    07/14/17 0356   Fall Risk (Adult)   Absence of Falls achieves outcome         Problem: Knee Replacement, Total (Adult)  Goal: Signs and Symptoms of Listed Potential Problems Will be Absent or Manageable (Knee Replacement, Total)  Outcome: Ongoing (interventions implemented as appropriate)    07/14/17 0356   Knee Replacement, Total   Problems Assessed (Total Knee Replacement) all   Problems Present (Total Knee Replacement) pain;decreased range of motion;functional decline/self care deficit;situational response

## 2017-07-14 NOTE — PROGRESS NOTES
Orthopedic Total Progress Note        Patient: Natalie Terrazas    Date of Admission: 7/12/2017  6:04 AM    YOB: 1960    Medical Record Number: [id number]    Attending Physician: Rakesh Velasco MD      POD # 2     Status post: MI TOTAL KNEE ARTHROPLASTY [57564] (LT TOTAL KNEE ARTHROPLASTY)      Systemic or Specific Complaints: Pain Control      Allergies   Allergen Reactions   • Naproxen Swelling   • Nickel          Current Medications:  Scheduled Meds:  atenolol 25 mg Oral Daily   DULoxetine 30 mg Oral Daily   DULoxetine 60 mg Oral Daily   levothyroxine 175 mcg Oral Daily   sennosides-docusate sodium 2 tablet Oral BID   warfarin 6 mg Oral Daily     Continuous Infusions:  lactated ringers 100 mL/hr Last Rate: 100 mL/hr (07/13/17 0428)     PRN Meds:.bisacodyl  •  diphenhydrAMINE  •  docusate sodium  •  HYDROmorphone **AND** naloxone  •  magnesium hydroxide  •  ondansetron  •  oxyCODONE-acetaminophen      Physical Exam: 56 y.o. female  General Appearance:    alert and oriented                Pain Relief: Patient reports some relief       Vitals:    07/13/17 1831 07/13/17 2225 07/14/17 0055 07/14/17 0344   BP: 150/80 140/67 175/84 159/86   BP Location: Left arm Left arm Right arm Right arm   Patient Position: Lying Lying Lying Lying   Pulse: 78 90 78 86   Resp: 20 16 18 18   Temp: 98.3 °F (36.8 °C) 98.8 °F (37.1 °C) 97.8 °F (36.6 °C) 98.7 °F (37.1 °C)   TempSrc: Oral Oral Oral Oral   SpO2: 96% 90% 96% 97%   Weight:       Height:               Extremities:   Operative extremity neurovascular status intact. ROM intact.    Incision intact w/out signs or  symptoms of infection. No           edema, no cyanosis, no calf tenderness     Pulses:     Pulses palpable and equal bilaterally     Skin:     Skin Warm/Dry w/out ulceration, ecchymosis, rash, or   cyanosis     Activity: Mobilizing Per P.T.       Diagnostic Tests:   Admission on 07/12/2017   Component Date Value Ref Range Status   • Hemoglobin 07/12/2017 12.3   11.9 - 15.5 g/dL Final   • Hematocrit 07/12/2017 38.8  35.6 - 45.5 % Final   • Glucose 07/13/2017 103* 65 - 99 mg/dL Final   • BUN 07/13/2017 15  6 - 20 mg/dL Final   • Creatinine 07/13/2017 1.07* 0.57 - 1.00 mg/dL Final   • Sodium 07/13/2017 134* 136 - 145 mmol/L Final   • Potassium 07/13/2017 4.4  3.5 - 5.2 mmol/L Final   • Chloride 07/13/2017 98  98 - 107 mmol/L Final   • CO2 07/13/2017 23.1  22.0 - 29.0 mmol/L Final   • Calcium 07/13/2017 8.2* 8.6 - 10.5 mg/dL Final   • eGFR Non African Amer 07/13/2017 53* >60 mL/min/1.73 Final   • BUN/Creatinine Ratio 07/13/2017 14.0  7.0 - 25.0 Final   • Anion Gap 07/13/2017 12.9  mmol/L Final   • Hemoglobin 07/13/2017 10.4* 11.9 - 15.5 g/dL Final   • Hematocrit 07/13/2017 33.1* 35.6 - 45.5 % Final   • Protime 07/13/2017 16.8* 11.7 - 14.2 Seconds Final   • INR 07/13/2017 1.41* 0.90 - 1.10 Final   • Hemoglobin 07/14/2017 10.9* 11.9 - 15.5 g/dL Final   • Hematocrit 07/14/2017 34.0* 35.6 - 45.5 % Final       Xr Chest Pa & Lateral    Result Date: 7/7/2017  Narrative: PA AND LATERAL CHEST X-RAY  HISTORY: Prior breast cancer status post right mastectomy. Hypertension controlled with medication. Preop knee replacement.  TECHNIQUE: Chest x-ray consisting of PA and lateral view is provided.   COMPARISON: None.  FINDINGS: The cardiomediastinal silhouette is normal. The lungs are clear. The costophrenic sulci are dry and the bones appear normal. There is no pneumothorax.      Impression: Negative.  This report was finalized on 7/7/2017 1:54 PM by Dr. Chris Land MD.          Assessment:  Patient Active Problem List   Diagnosis   • Osteoarthritis of knee     Post-operative Pain  Immobility    Plan:    Continue Physical Therapy, increase mobility as tolerated.  Continue SCDs, Continue DVT prophalaxis.  Continue Pain management efforts  Continue Incisional care      Discharge Plan: tomorrow to SNF    Date: 7/14/2017   Time: 6:43 AM    Rakesh Velasco MD

## 2017-07-14 NOTE — THERAPY TREATMENT NOTE
Acute Care - Physical Therapy Treatment Note  [facility]     Patient Name: Natalie Terrazas  : 1960  MRN: [id number]  Today's Date: 2017             Admit Date: 2017    Visit Dx:    ICD-10-CM ICD-9-CM   1. Difficulty walking R26.2 719.7     Patient Active Problem List   Diagnosis   • Osteoarthritis of knee               Adult Rehabilitation Note       17 1300 17 1645 17 1055    Rehab Assessment/Intervention    Discipline physical therapy assistant  -CASTRO physical therapy assistant  - physical therapy assistant  -    Document Type therapy note (daily note)  - therapy note (daily note)  - therapy note (daily note)  -    Subjective Information agree to therapy;complains of;pain  - agree to therapy;complains of;weakness;fatigue;pain  - agree to therapy;complains of;weakness;fatigue;pain;swelling  -    Precautions/Limitations fall precautions  - fall precautions  - fall precautions  -    Recorded by [] Sofia Marino PTA [] Sofia Marino PTA [] Sofia Marino PTA    Pain Assessment    Pain Assessment 0-10  -JM 0-10  -JM 0-10  -JM    Pain Score 7  -JM 8  -JM 4  -JM    Post Pain Score 8  -JM      Pain Type Surgical pain  - Surgical pain  - Surgical pain  -    Pain Location Knee  - Knee  -JM Knee  -    Pain Orientation Left  -JM Left  -JM Left  -JM    Pain Intervention(s) Medication (See MAR);Repositioned;Elevated  - Medication (See MAR);Repositioned;Cold applied  - Medication (See MAR);Repositioned;Cold applied  -    Response to Interventions nearly passed out in chair from pain, improved at end of session  - whitley, unchanged  - whitley  -JM    Recorded by [CASTRO] Sofia Marino PTA [JM] Sofia Marino PTA [JM] Sofia Marino PTA    ROM (Range of Motion)    General ROM Detail -5-75 (pn limiting)  -JM  -- w/ace AROM  -JM    Recorded by [CASTRO] Sofia Marino PTA  [JM] Sofia Marino PTA    Bed Mobility, Assessment/Treatment    Bed  Mobility, Comment in chair  -JM  in chair  -JM    Recorded by [JM] Sofia Marino PTA  [JM] Sofia Marino PTA    Transfer Assessment/Treatment    Transfers, Sit-Stand [place] minimum assist (75% patient effort);moderate assist (50% patient effort)  -JM minimum assist (75% patient effort);moderate assist (50% patient effort)  -JM minimum assist (75% patient effort);moderate assist (50% patient effort)  -JM    Transfers, Stand-Sit [place] contact guard assist;verbal cues required  -JM contact guard assist;verbal cues required  -JM contact guard assist;verbal cues required  -JM    Transfers, Sit-Stand-Sit, Assist Device rolling walker   her rollator  -JM rolling walker   her rollator  -JM rolling walker   her rollator  -JM    Transfer, Impairments strength decreased;impaired balance  -JM strength decreased;impaired balance  -JM strength decreased;impaired balance  -JM    Transfer, Comment 2 attempts today also to fully stand  -JM 2 attempts to stand  -JM     Recorded by [CASTRO] Sofia Marino PTA [JM] Sofia Marino PTA [] Sofia Marino PTA    Gait Assessment/Treatment    Gait, [place] Level contact guard assist;verbal cues required  -JM contact guard assist;verbal cues required  -JM contact guard assist;verbal cues required  -JM    Gait, Assistive Device rollator  -JM rollator  -JM rollator  -JM    Gait, Distance (Feet) 45  -JM 35  -JM 20  -JM    Gait, Comment  pain /fatigue limiting  -JM increased bleeding limiting dist  -JM    Recorded by [JM] Sofia Marino PTA [JM] Sofia Marino PTA [] Sofia Marino PTA    Therapy Exercises    Exercise Protocols total knee  -JM total knee  -JM total knee  -JM    Total Knee Exercises left:;completed protocol;with assist;SLR;25 reps   to initiate  -JM left:;completed protocol;with assist;SLR;20 reps   to initiate  -JM left:;15 reps;completed protocol;with assist;SLR  -JM    Recorded by [CASTRO] Sofia Marino PTA [JM] Sofia Marino PTA []  Sofia Marino PTA    Positioning and Restraints    Pre-Treatment Position sitting in chair/recliner  -JM sitting in chair/recliner  -JM sitting in chair/recliner  -JM    In Chair reclined;call light within reach;encouraged to call for assist;with family/caregiver   friend in room  -JM reclined;call light within reach;encouraged to call for assist;notified nsg   nsg to assist to BR  -JM reclined;call light within reach;encouraged to call for assist;with family/caregiver  -    Recorded by [JM] Sofia Marino PTA [JM] Sofia Marino PTA [JM] Sofia Marino PTA      07/12/17 6206          Rehab Assessment/Intervention    Discipline physical therapist  -DM      Document Type therapy note (daily note)  -DM      Subjective Information no complaints;agree to therapy  -DM      Patient Effort, Rehab Treatment good  -DM      Precautions/Limitations fall precautions  -DM      Recorded by [DM] Keerthi Garcia, PT      Pain Assessment    Pain Assessment 0-10  -DM      Pain Score 1  -DM      Pain Type Acute pain  -DM      Pain Location Knee  -DM      Pain Orientation Left  -DM      Pain Intervention(s) Ambulation/increased activity;Cold applied;MD notified (Comment);Elevated  -DM      Recorded by [DM] Keerthi Garcia, PT      Vision Assessment/Intervention    Visual Impairment WFL  -DM      Recorded by [DM] Keerthi Garcia, PT      Cognitive Assessment/Intervention    Current Cognitive/Communication Assessment functional  -DM      Orientation Status oriented x 4  -DM      Follows Commands/Answers Questions 100% of the time  -DM      Personal Safety WNL/WFL  -DM      Personal Safety Interventions fall prevention program maintained;gait belt;nonskid shoes/slippers when out of bed;supervised activity  -DM      Recorded by [DM] Keerthi Garcia, PT      Bed Mobility, Assessment/Treatment    Bed Mobility, Assistive Device bed rails  -DM      Bed Mob, Supine to Sit, [place] supervision required  -DM      Bed Mob, Sit to Supine,  [place] supervision required  -DM      Recorded by [DM] Keerthi Garcia, PT      Transfer Assessment/Treatment    Transfers, Sit-Stand [place] contact guard assist  -DM      Transfers, Stand-Sit [place] contact guard assist  -DM      Transfers, Sit-Stand-Sit, Assist Device rolling walker  -DM      Recorded by [DM] Keerthi Garcia, PT      Gait Assessment/Treatment    Gait, [place] Level contact guard assist;verbal cues required  -DM      Gait, Assistive Device rolling walker  -DM      Gait, Distance (Feet) 120  -DM      Gait, Comment [place] is PLOF  -DM      Recorded by [DM] Keerthi Garcia, PT      Positioning and Restraints    Pre-Treatment Position in bed  -DM      Post Treatment Position bed  -DM      In Bed notified nsg;fowlers;call light within reach;encouraged to call for assist;exit alarm on;LLE elevated   ice  -DM      Recorded by [DM] Keerthi Garcia, PT        User Key  (r) = Recorded By, (t) = Taken By, (c) = Cosigned By    Initials Name Effective Dates    DM Keerthi Garcia, PT 10/06/15 -     JM Sofia Marino, PTA 02/18/16 -                 IP PT Goals       07/12/17 1433          Bed Mobility PT LTG    Bed Mobility PT LTG, Date Established (P)  07/12/17  -DM      Bed Mobility PT LTG, Time to Achieve (P)  2 days  -DM      Bed Mobility PT LTG, Activity Type (P)  all bed mobility  -DM      Bed Mobility PT LTG, [place] Level (P)  independent  -DM      Transfer Training PT LTG    Transfer Training PT LTG, Date Established (P)  07/12/17  -DM      Transfer Training PT LTG, Time to Achieve (P)  3 days  -DM      Transfer Training PT LTG, Activity Type (P)  all transfers  -DM      Transfer Training PT LTG, [place] Level (P)  independent  -DM      Transfer Training PT LTG, Assist Device (P)  walker, rolling  -DM      Gait Training PT LTG    Gait Training Goal PT LTG, Date Established (P)  07/12/17  -DM      Gait Training Goal PT LTG, Time to Achieve (P)  3 days  -DM      Gait  "Training Goal PT LTG, [place] Level (P)  supervision required  -DM      Gait Training Goal PT LTG, Assist Device (P)  walker, rolling  -DM      Gait Training Goal PT LTG, Distance to Achieve (P)  120  -DM      Stair Training PT LTG    Stair Training Goal PT LTG, Date Established (P)  07/12/17  -DM      Stair Training Goal PT LTG, Time to Achieve (P)  3 days  -DM      Stair Training Goal PT LTG, Number of Steps (P)  2  -DM      Stair Training Goal PT LTG, [place] Level (P)  contact guard assist  -DM      Stair Training Goal PT LTG, Assist Device (P)  1 handrail   has \"handles\" to hold onto   -DM      Range of Motion PT LTG    Range of Motion Goal PT LTG, Date Established (P)  07/05/17  -DM      Range of Motion Goal PT LTG, Time to Achieve (P)  3 days  -DM      Range fo Motion Goal PT LTG, Joint (P)  L knee  -DM      Range of Motion Goal PT LTG, AROM Measure (P)  0-90  -DM        User Key  (r) = Recorded By, (t) = Taken By, (c) = Cosigned By    Initials Name Provider Type    JOE Garcia, PT Physical Therapist          Physical Therapy Education     Title: PT OT SLP Therapies (Done)     Topic: Physical Therapy (Done)     Point: Mobility training (Done)    Learning Progress Summary    Learner Readiness Method Response Comment Documented by Status   Patient Acceptance E,TB,D KAYA ESTRELLA   07/14/17 1303 Done    Acceptance E,TB,D SAMEER KIRKPATRICK   07/13/17 1648 Done    Eager MAGI VITAL   07/12/17 1751 Done    Acceptance ED SJNR   07/12/17 1634 Done    Acceptance E,TBD SJNR   07/12/17 1432 Done   Family Acceptance E,TB,D KAYA ESTRELLA   07/14/17 1303 Done               Point: Home exercise program (Done)    Learning Progress Summary    Learner Readiness Method Response Comment Documented by Status   Patient Acceptance E,TB,D KAYA ESTRELLA   07/14/17 1303 Done    Acceptance E,TB,D SAMEER KIRKPATRICK   07/13/17 1648 Done    Eager MAGI VITAL   07/12/17 1751 Done    Acceptance E,TB,D SJ,NR   07/12/17 1432 Done   Family Acceptance E,TB,D " VU,NR   07/14/17 1303 Done               Point: Body mechanics (Done)    Learning Progress Summary    Learner Readiness Method Response Comment Documented by Status   Patient Acceptance E,TB,D VU,NR   07/14/17 1303 Done    Acceptance E,TB,D NR,VU   07/13/17 1648 Done    Eager E,TB VU   07/12/17 1751 Done    Acceptance E,D DU,NR   07/12/17 1634 Done    Acceptance E,TB,D DU,NR   07/12/17 1432 Done   Family Acceptance E,TB,D VU,NR   07/14/17 1303 Done               Point: Precautions (Done)    Learning Progress Summary    Learner Readiness Method Response Comment Documented by Status   Patient Acceptance E,TB,D VU,NR   07/14/17 1303 Done    Acceptance E,TB,D NR,VU   07/13/17 1648 Done    Eager E,TB VU   07/12/17 1751 Done    Acceptance E,D DU,NR   07/12/17 1634 Done    Acceptance E,TB,D DU,NR   07/12/17 1432 Done   Family Acceptance E,TB,D VU,NR   07/14/17 1303 Done                      User Key     Initials Effective Dates Name Provider Type Discipline     10/06/15 -  Keerthi Garcia, PT Physical Therapist PT     02/18/16 -  Sofia Marino PTA Physical Therapy Assistant PT     05/11/17 -  Shanta Cespedes, RN Extern Registered Nurse Nurse                    PT Recommendation and Plan  Anticipated Discharge Disposition: home with assist, home with home health  Planned Therapy Interventions: balance training, bed mobility training, gait training, home exercise program, patient/family education, ROM (Range of Motion), strengthening, stair training, transfer training  PT Frequency: 2 times/day  Plan of Care Review  Plan Of Care Reviewed With: patient  Progress: improving  Outcome Summary/Follow up Plan: incr amb dist despite pain issues earlier in PT session          Outcome Measures       07/14/17 1300 07/13/17 1700 07/12/17 1600    How much help from another person do you currently need...    Turning from your back to your side while in flat bed without using bedrails? 3  -JM 3  -JM 4   -DM    Moving from lying on back to sitting on the side of a flat bed without bedrails? 2  -JM 2  -JM 4  -DM    Moving to and from a bed to a chair (including a wheelchair)? 3  -JM 3  -JM 3  -DM    Standing up from a chair using your arms (e.g., wheelchair, bedside chair)? 2  -JM 2  -JM 3  -DM    Climbing 3-5 steps with a railing? 1  -JM 1  -JM 3  -DM    To walk in hospital room? 3  -JM 3  -JM 3  -DM    AM-PAC 6 Clicks Score 14  -JM 14  -JM 20  -DM    Functional Assessment    Outcome Measure Options   AM-PAC 6 Clicks Basic Mobility (PT)  -DM      07/12/17 1500          How much help from another person do you currently need...    Turning from your back to your side while in flat bed without using bedrails? 4  -DM      Moving from lying on back to sitting on the side of a flat bed without bedrails? 3  -DM      Moving to and from a bed to a chair (including a wheelchair)? 3  -DM      Standing up from a chair using your arms (e.g., wheelchair, bedside chair)? 3  -DM      Climbing 3-5 steps with a railing? 3  -DM      To walk in hospital room? 3  -DM      AM-PAC 6 Clicks Score 19  -DM      Functional Assessment    Outcome Measure Options AM-PAC 6 Clicks Basic Mobility (PT)  -DM        User Key  (r) = Recorded By, (t) = Taken By, (c) = Cosigned By    Initials Name Provider Type    JOE Garcia, PT Physical Therapist    CASTRO Marino PTA Physical Therapy Assistant           Time Calculation:         PT Charges       07/14/17 1305          Time Calculation    Start Time 0940  -CASTRO      Stop Time 1050  -      Time Calculation (min) 70 min  -CASTRO      PT Received On 07/14/17  -CASTRO      PT - Next Appointment 07/14/17  -CASTRO        User Key  (r) = Recorded By, (t) = Taken By, (c) = Cosigned By    Initials Name Provider Type    CASTRO Marino PTA Physical Therapy Assistant          Therapy Charges for Today     Code Description Service Date Service Provider Modifiers Qty    48630064491 HC PT THER PROC EA 15 MIN  7/13/2017 Sofia Marino, PTA GP 1    52398910930 HC PT THER PROC GROUP 7/13/2017 Sofia Marino, PTA GP 1    48854216371 HC PT THER PROC EA 15 MIN 7/13/2017 Sofia Marino, PTA GP 2    92177541297 HC PT THER PROC GROUP 7/13/2017 Sofia Marino, PTA GP 1    30098348796 HC PT THER PROC EA 15 MIN 7/14/2017 Sofia Marino, PTA GP 2    30253753987 HC PT THER PROC GROUP 7/14/2017 Sofia Marino, PTA GP 1          PT G-Codes  Outcome Measure Options: AM-PAC 6 Clicks Basic Mobility (PT)    Sofia Marino PTA  7/14/2017

## 2017-07-14 NOTE — PROGRESS NOTES
Continued Stay Note  [facility]     Patient Name: Natalie Terrazas  MRN: [id number]  Today's Date: 7/14/2017    Admit Date: 7/12/2017          Discharge Plan       07/14/17 1039    Case Management/Social Work Plan    Plan DC to [facility], bed available over the weekend.    Additional Comments Attempted to speak with pt at bedside re: plans for DC to [facility]. Pt not in the room. Spoke with Nemo/Rae who states [facility] will have a skilled bed available for pt over the weekend, Saturday or Sunday. CCP will continue to follow for DC needs.........JW              Discharge Codes     None        Expected Discharge Date and Time     Expected Discharge Date Expected Discharge Time    Jul 15, 2017             Savanah Reyes RN

## 2017-07-14 NOTE — THERAPY TREATMENT NOTE
Acute Care - Physical Therapy Treatment Note  [facility]     Patient Name: Natalie Terrazas  : 1960  MRN: [id number]  Today's Date: 2017             Admit Date: 2017    Visit Dx:    ICD-10-CM ICD-9-CM   1. Difficulty walking R26.2 719.7     Patient Active Problem List   Diagnosis   • Osteoarthritis of knee               Adult Rehabilitation Note       17 1639 17 1300 17 1645    Rehab Assessment/Intervention    Discipline physical therapy assistant  -CASTRO physical therapy assistant  - physical therapy assistant  -    Document Type therapy note (daily note)  - therapy note (daily note)  - therapy note (daily note)  -    Subjective Information agree to therapy;complains of;weakness;fatigue;pain;swelling  - agree to therapy;complains of;pain  - agree to therapy;complains of;weakness;fatigue;pain  -    Precautions/Limitations fall precautions  - fall precautions  - fall precautions  -    Recorded by [] Sofia Marino PTA [] Sofia Marino PTA [] Sofia Marino PTA    Pain Assessment    Pain Assessment 0-10  -JM 0-10  -JM 0-10  -JM    Pain Score 7  -JM 7  -JM 8  -JM    Post Pain Score  8  -JM     Pain Type Surgical pain  -JM Surgical pain  -JM Surgical pain  -    Pain Location Knee  -JM Knee  -JM Knee  -JM    Pain Orientation Left  -JM Left  -JM Left  -JM    Pain Intervention(s) Medication (See MAR);Repositioned;Cold applied  - Medication (See MAR);Repositioned;Elevated  - Medication (See MAR);Repositioned;Cold applied  -    Response to Interventions whit[place]  -JM nearly passed out in chair from pain, improved at end of session  - whitley, unchanged  -    Recorded by [CASTRO] Sofia Marino PTA [JM] Sofia Marino PTA [JM] Sofia Marino PTA    ROM (Range of Motion)    General ROM Detail  -5-75 (pn limiting)  -     Recorded by  [CASTRO] Sofia Marino PTA     Bed Mobility, Assessment/Treatment    Bed Mobility, Comment  in chair  -     Recorded by   [JM] Sofia Marino PTA     Transfer Assessment/Treatment    Transfers, Sit-Stand [place] minimum assist (75% patient effort);verbal cues required;nonverbal cues required (demo/gesture)  -JM minimum assist (75% patient effort);moderate assist (50% patient effort)  -JM minimum assist (75% patient effort);moderate assist (50% patient effort)  -JM    Transfers, Stand-Sit [place] contact guard assist;verbal cues required  -JM contact guard assist;verbal cues required  -JM contact guard assist;verbal cues required  -JM    Transfers, Sit-Stand-Sit, Assist Device rolling walker   her rollator  -JM rolling walker   her rollator  -JM rolling walker   her rollator  -JM    Transfer, Impairments strength decreased;impaired balance  -JM strength decreased;impaired balance  -JM strength decreased;impaired balance  -JM    Transfer, Comment only one attempt this pm  -JM 2 attempts today also to fully stand  -JM 2 attempts to stand  -JM    Recorded by [JM] Sofia Marino PTA [JM] Sofia Marino PTA [JM] Sofia Marino PTA    Gait Assessment/Treatment    Gait, [place] Level contact guard assist;verbal cues required  -JM contact guard assist;verbal cues required  -JM contact guard assist;verbal cues required  -JM    Gait, Assistive Device rollator  -JM rollator  -JM rollator  -JM    Gait, Distance (Feet) 60  -JM 45  -JM 35  -JM    Gait, Gait Deviations antalgic;solo decreased;forward flexed posture;step length decreased  -JM antalgic;solo decreased;forward flexed posture;step length decreased  -JM     Gait, Comment   pain /fatigue limiting  -JM    Recorded by [JM] Sofia Marino PTA [JM] Sofia Marino PTA [JM] Sofia Marino PTA    Therapy Exercises    Exercise Protocols total knee  -JM total knee  -JM total knee  -JM    Total Knee Exercises left:;completed protocol;with assist;SLR;30 reps   to initiate  -JM left:;completed protocol;with assist;SLR;25 reps   to initiate  -JM left:;completed  protocol;with assist;SLR;20 reps   to initiate  -JM    Recorded by [CASTRO] Sofia Marino PTA [JM] Sofia Marino PTA [JM] Sofia Marino PTA    Positioning and Restraints    Pre-Treatment Position sitting in chair/recliner  -JM sitting in chair/recliner  -JM sitting in chair/recliner  -JM    In Chair reclined;call light within reach;encouraged to call for assist;with family/caregiver  -JM reclined;call light within reach;encouraged to call for assist;with family/caregiver   friend in room  -JM reclined;call light within reach;encouraged to call for assist;notified nsg   nsg to assist to BR  -JM    Recorded by [CASTRO] Sofia Marino PTA [CASTRO] Sofia Marino PTA [] Sofia Marino PTA      07/13/17 1055 07/12/17 1626       Rehab Assessment/Intervention    Discipline physical therapy assistant  - physical therapist  -DM     Document Type therapy note (daily note)  - therapy note (daily note)  -DM     Subjective Information agree to therapy;complains of;weakness;fatigue;pain;swelling  - no complaints;agree to therapy  -DM     Patient Effort, Rehab Treatment  good  -DM     Precautions/Limitations fall precautions  - fall precautions  -DM     Recorded by [CASTRO] Sofia Marino PTA [DM] Keerthi Garcia, PT     Pain Assessment    Pain Assessment 0-10  - 0-10  -DM     Pain Score 4  -JM 1  -DM     Pain Type Surgical pain  - Acute pain  -DM     Pain Location Knee  - Knee  -DM     Pain Orientation Left  -JM Left  -DM     Pain Intervention(s) Medication (See MAR);Repositioned;Cold applied  - Ambulation/increased activity;Cold applied;MD notified (Comment);Elevated  -DM     Response to Interventions whitley  -JM      Recorded by [CASTRO] Sofia Marino PTA [DM] Keerthi Garcia, PT     Vision Assessment/Intervention    Visual Impairment  WFL  -DM     Recorded by  [JOE] Keerthi Garcia, PT     Cognitive Assessment/Intervention    Current Cognitive/Communication Assessment  functional  -DM     Orientation Status  oriented x  4  -DM     Follows Commands/Answers Questions  100% of the time  -DM     Personal Safety  WNL/WFL  -DM     Personal Safety Interventions  fall prevention program maintained;gait belt;nonskid shoes/slippers when out of bed;supervised activity  -DM     Recorded by  [DM] Keerthi Garcia, PT     ROM (Range of Motion)    General ROM Detail -5-87 w/ace AROM  -JM      Recorded by [JM] Sofia Marino PTA      Bed Mobility, Assessment/Treatment    Bed Mobility, Assistive Device  bed rails  -DM     Bed Mob, Supine to Sit, [place]  supervision required  -DM     Bed Mob, Sit to Supine, [place]  supervision required  -DM     Bed Mobility, Comment in chair  -JM      Recorded by [JM] Sofia Marino PTA [DM] Keerthi Garcia, PT     Transfer Assessment/Treatment    Transfers, Sit-Stand [place] minimum assist (75% patient effort);moderate assist (50% patient effort)  - contact guard assist  -DM     Transfers, Stand-Sit [place] contact guard assist;verbal cues required  - contact guard assist  -DM     Transfers, Sit-Stand-Sit, Assist Device rolling walker   her rollator  -JM rolling walker  -DM     Transfer, Impairments strength decreased;impaired balance  -JM      Recorded by [JM] Sofia Marino PTA [DM] Keerthi Garcia, PT     Gait Assessment/Treatment    Gait, [place] Level contact guard assist;verbal cues required  - contact guard assist;verbal cues required  -     Gait, Assistive Device rollator  - rolling walker  -DM     Gait, Distance (Feet) 20  -  -DM     Gait, Comment increased bleeding limiting dist  -JM [place] is PLOF  -DM     Recorded by [JM] Sofia Marino PTA [DM] Keerthi Garcia, PT     Therapy Exercises    Exercise Protocols total knee  -      Total Knee Exercises left:;15 reps;completed protocol;with assist;SLR  -JM      Recorded by [JM] Sofia Marino PTA      Positioning and Restraints    Pre-Treatment Position sitting in chair/recliner  -JM in bed  -DM     Post  Treatment Position  bed  -DM     In Bed  notified nsg;fowlers;call light within reach;encouraged to call for assist;exit alarm on;LLE elevated   ice  -DM     In Chair reclined;call light within reach;encouraged to call for assist;with family/caregiver  -JM      Recorded by [JM] Sofia Marino, PTA [DM] Keerthi Garcia, PT       User Key  (r) = Recorded By, (t) = Taken By, (c) = Cosigned By    Initials Name Effective Dates    DM Keerthi Garcia, PT 10/06/15 -     JM Sofia Marino, PTA 02/18/16 -                 IP PT Goals       07/12/17 1433          Bed Mobility PT LTG    Bed Mobility PT LTG, Date Established (P)  07/12/17  -DM      Bed Mobility PT LTG, Time to Achieve (P)  2 days  -DM      Bed Mobility PT LTG, Activity Type (P)  all bed mobility  -DM      Bed Mobility PT LTG, [place] Level (P)  independent  -DM      Transfer Training PT LTG    Transfer Training PT LTG, Date Established (P)  07/12/17  -DM      Transfer Training PT LTG, Time to Achieve (P)  3 days  -DM      Transfer Training PT LTG, Activity Type (P)  all transfers  -DM      Transfer Training PT LTG, [place] Level (P)  independent  -DM      Transfer Training PT LTG, Assist Device (P)  walker, rolling  -DM      Gait Training PT LTG    Gait Training Goal PT LTG, Date Established (P)  07/12/17  -DM      Gait Training Goal PT LTG, Time to Achieve (P)  3 days  -DM      Gait Training Goal PT LTG, [place] Level (P)  supervision required  -DM      Gait Training Goal PT LTG, Assist Device (P)  walker, rolling  -DM      Gait Training Goal PT LTG, Distance to Achieve (P)  120  -DM      Stair Training PT LTG    Stair Training Goal PT LTG, Date Established (P)  07/12/17  -DM      Stair Training Goal PT LTG, Time to Achieve (P)  3 days  -DM      Stair Training Goal PT LTG, Number of Steps (P)  2  -DM      Stair Training Goal PT LTG, [place] Level (P)  contact guard assist  -DM      Stair Training Goal PT LTG, Assist Device (P)  1 handrail   has  "\"handles\" to hold onto   -DM      Range of Motion PT LTG    Range of Motion Goal PT LTG, Date Established (P)  07/05/17  -DM      Range of Motion Goal PT LTG, Time to Achieve (P)  3 days  -DM      Range fo Motion Goal PT LTG, Joint (P)  L knee  -DM      Range of Motion Goal PT LTG, AROM Measure (P)  0-90  -DM        User Key  (r) = Recorded By, (t) = Taken By, (c) = Cosigned By    Initials Name Provider Type    JOE Garcia, PT Physical Therapist          Physical Therapy Education     Title: PT OT SLP Therapies (Done)     Topic: Physical Therapy (Done)     Point: Mobility training (Done)    Learning Progress Summary    Learner Readiness Method Response Comment Documented by Status   Patient Acceptance E,TB,D VU,NR   07/14/17 1303 Done    Acceptance E,TB,D NR,VU   07/13/17 1648 Done    Eager E,TB SAMEER   07/12/17 1751 Done    Acceptance E,D DU,NR   07/12/17 1634 Done    Acceptance E,TB,D DU,NR   07/12/17 1432 Done   Family Acceptance E,TB,D VU,NR   07/14/17 1303 Done               Point: Home exercise program (Done)    Learning Progress Summary    Learner Readiness Method Response Comment Documented by Status   Patient Acceptance E,TB,D VU,NR   07/14/17 1303 Done    Acceptance E,TB,D NR,VU   07/13/17 1648 Done    Eager E,TB SAMEER   07/12/17 1751 Done    Acceptance E,TB,D DU,NR   07/12/17 1432 Done   Family Acceptance E,TB,D VU,NR   07/14/17 1303 Done               Point: Body mechanics (Done)    Learning Progress Summary    Learner Readiness Method Response Comment Documented by Status   Patient Acceptance E,TB,D VU,NR   07/14/17 1303 Done    Acceptance E,TB,D NR,VU   07/13/17 1648 Done    Eager E,TB SAMEER   07/12/17 1751 Done    Acceptance E,D DU,NR   07/12/17 1634 Done    Acceptance E,TB,D DU,NR   07/12/17 1432 Done   Family Acceptance E,TB,D VU,NR   07/14/17 1303 Done               Point: Precautions (Done)    Learning Progress Summary    Learner Readiness Method Response Comment " Documented by Status   Patient Acceptance E,TB,D VU,NR   07/14/17 1303 Done    Acceptance E,TB,D NR,VU   07/13/17 1648 Done    Eager E,TB VU   07/12/17 1751 Done    Acceptance E,D DU,NR   07/12/17 1634 Done    Acceptance E,TB,D DU,NR   07/12/17 1432 Done   Family Acceptance E,TB,D VU,NR   07/14/17 1303 Done                      User Key     Initials Effective Dates Name Provider Type Discipline     10/06/15 -  Keerthi Garcia, PT Physical Therapist PT     02/18/16 -  Sofia Marino, PTA Physical Therapy Assistant PT     05/11/17 -  Shanta Cespedes, RN Extern Registered Nurse Nurse                    PT Recommendation and Plan  Anticipated Discharge Disposition: home with assist, home with home health  Planned Therapy Interventions: balance training, bed mobility training, gait training, home exercise program, patient/family education, ROM (Range of Motion), strengthening, stair training, transfer training  PT Frequency: 2 times/day  Plan of Care Review  Plan Of Care Reviewed With: patient  Progress: improving  Outcome Summary/Follow up Plan: incr amb dist despite pain issues earlier in PT session          Outcome Measures       07/14/17 1300 07/13/17 1700 07/12/17 1600    How much help from another person do you currently need...    Turning from your back to your side while in flat bed without using bedrails? 3  -JM 3  -JM 4  -DM    Moving from lying on back to sitting on the side of a flat bed without bedrails? 2  -JM 2  -JM 4  -DM    Moving to and from a bed to a chair (including a wheelchair)? 3  -JM 3  -JM 3  -DM    Standing up from a chair using your arms (e.g., wheelchair, bedside chair)? 2  -JM 2  -JM 3  -DM    Climbing 3-5 steps with a railing? 1  -JM 1  -JM 3  -DM    To walk in hospital room? 3  -JM 3  -JM 3  -DM    AM-PAC 6 Clicks Score 14  -JM 14  -JM 20  -DM    Functional Assessment    Outcome Measure Options   AM-PAC 6 Clicks Basic Mobility (PT)  -DM      07/12/17 1500          How  much help from another person do you currently need...    Turning from your back to your side while in flat bed without using bedrails? 4  -DM      Moving from lying on back to sitting on the side of a flat bed without bedrails? 3  -DM      Moving to and from a bed to a chair (including a wheelchair)? 3  -DM      Standing up from a chair using your arms (e.g., wheelchair, bedside chair)? 3  -DM      Climbing 3-5 steps with a railing? 3  -DM      To walk in hospital room? 3  -DM      AM-PAC 6 Clicks Score 19  -DM      Functional Assessment    Outcome Measure Options AM-PAC 6 Clicks Basic Mobility (PT)  -DM        User Key  (r) = Recorded By, (t) = Taken By, (c) = Cosigned By    Initials Name Provider Type    JOE Garcia, PT Physical Therapist    CASTRO Marino PTA Physical Therapy Assistant           Time Calculation:         PT Charges       07/14/17 1641 07/14/17 1305       Time Calculation    Start Time 1400  - 0940  -     Stop Time 1453  - 1050  -     Time Calculation (min) 53 min  - 70 min  -     PT Received On 07/14/17  - 07/14/17  -CASTRO     PT - Next Appointment 07/15/17  - 07/14/17  -CASTRO       User Key  (r) = Recorded By, (t) = Taken By, (c) = Cosigned By    Initials Name Provider Type    CASTRO Marino PTA Physical Therapy Assistant          Therapy Charges for Today     Code Description Service Date Service Provider Modifiers Qty    70634182902 HC PT THER PROC EA 15 MIN 7/13/2017 Sofia Marino PTA GP 1    28412282361 HC PT THER PROC GROUP 7/13/2017 Sofia Marino PTA GP 1    14458401875 HC PT THER PROC EA 15 MIN 7/13/2017 Sofia Marino PTA GP 2    84432662191 HC PT THER PROC GROUP 7/13/2017 Sofia Marino PTA GP 1    79031856948 HC PT THER PROC EA 15 MIN 7/14/2017 Sofia Marino PTA GP 2    29131567104 HC PT THER PROC GROUP 7/14/2017 Sofia Marino PTA GP 1    83578478689 HC PT THER PROC EA 15 MIN 7/14/2017 Sofia Marino PTA GP 1    21078990917 HC PT THER PROC  GROUP 7/14/2017 Sofia Marino, PTA GP 1          PT G-Codes  Outcome Measure Options: AM-PAC 6 Clicks Basic Mobility (PT)    Sofia Marino PTA  7/14/2017

## 2017-07-14 NOTE — PLAN OF CARE
Problem: Patient Care Overview (Adult)  Goal: Plan of Care Review  Outcome: Ongoing (interventions implemented as appropriate)    07/14/17 1303   Coping/Psychosocial Response Interventions   Plan Of Care Reviewed With patient   Patient Care Overview   Progress improving   Outcome Evaluation   Outcome Summary/Follow up Plan incr amb dist despite pain issues earlier in PT session

## 2017-07-15 VITALS
TEMPERATURE: 99.3 F | WEIGHT: 278.25 LBS | HEIGHT: 64 IN | HEART RATE: 91 BPM | SYSTOLIC BLOOD PRESSURE: 125 MMHG | RESPIRATION RATE: 16 BRPM | OXYGEN SATURATION: 91 % | DIASTOLIC BLOOD PRESSURE: 71 MMHG | BODY MASS INDEX: 47.5 KG/M2

## 2017-07-15 LAB
HCT VFR BLD AUTO: 31.1 % (ref 35.6–45.5)
HGB BLD-MCNC: 10 G/DL (ref 11.9–15.5)
INR PPP: 1.85 (ref 0.9–1.1)
PROTHROMBIN TIME: 20.7 SECONDS (ref 11.7–14.2)

## 2017-07-15 PROCEDURE — 85610 PROTHROMBIN TIME: CPT | Performed by: ORTHOPAEDIC SURGERY

## 2017-07-15 PROCEDURE — 25010000002 ONDANSETRON PER 1 MG: Performed by: ORTHOPAEDIC SURGERY

## 2017-07-15 PROCEDURE — 85014 HEMATOCRIT: CPT | Performed by: ORTHOPAEDIC SURGERY

## 2017-07-15 PROCEDURE — 85018 HEMOGLOBIN: CPT | Performed by: ORTHOPAEDIC SURGERY

## 2017-07-15 PROCEDURE — 97150 GROUP THERAPEUTIC PROCEDURES: CPT

## 2017-07-15 PROCEDURE — 97110 THERAPEUTIC EXERCISES: CPT

## 2017-07-15 RX ORDER — WARFARIN SODIUM 7.5 MG/1
7.5 TABLET ORAL
Status: DISCONTINUED | OUTPATIENT
Start: 2017-07-15 | End: 2017-07-15 | Stop reason: HOSPADM

## 2017-07-15 RX ORDER — OXYCODONE AND ACETAMINOPHEN 7.5; 325 MG/1; MG/1
2 TABLET ORAL EVERY 4 HOURS PRN
Qty: 56 TABLET | Refills: 0 | Status: SHIPPED | OUTPATIENT
Start: 2017-07-15 | End: 2017-07-22

## 2017-07-15 RX ADMIN — LEVOTHYROXINE SODIUM 175 MCG: 175 TABLET ORAL at 08:51

## 2017-07-15 RX ADMIN — OXYCODONE HYDROCHLORIDE AND ACETAMINOPHEN 2 TABLET: 7.5; 325 TABLET ORAL at 09:56

## 2017-07-15 RX ADMIN — DULOXETINE HYDROCHLORIDE 30 MG: 30 CAPSULE, DELAYED RELEASE ORAL at 08:51

## 2017-07-15 RX ADMIN — DOCUSATE SODIUM -SENNOSIDES 2 TABLET: 50; 8.6 TABLET, COATED ORAL at 08:51

## 2017-07-15 RX ADMIN — ONDANSETRON 4 MG: 2 INJECTION INTRAMUSCULAR; INTRAVENOUS at 13:20

## 2017-07-15 RX ADMIN — DULOXETINE HYDROCHLORIDE 60 MG: 60 CAPSULE, DELAYED RELEASE ORAL at 08:50

## 2017-07-15 RX ADMIN — OXYCODONE HYDROCHLORIDE AND ACETAMINOPHEN 2 TABLET: 7.5; 325 TABLET ORAL at 05:59

## 2017-07-15 RX ADMIN — ATENOLOL 25 MG: 25 TABLET ORAL at 08:50

## 2017-07-15 RX ADMIN — OXYCODONE HYDROCHLORIDE AND ACETAMINOPHEN 2 TABLET: 7.5; 325 TABLET ORAL at 01:42

## 2017-07-15 NOTE — PLAN OF CARE
Problem: Patient Care Overview (Adult)  Goal: Plan of Care Review  Outcome: Ongoing (interventions implemented as appropriate)    07/14/17 1800   Coping/Psychosocial Response Interventions   Plan Of Care Reviewed With patient   Patient Care Overview   Progress improving   Outcome Evaluation   Outcome Summary/Follow up Plan Requested IV med for pain - Educated on the importance of weaning off of IV med. Pain relieved with PO med. Not requesting pain med as frequently. Given Coumadin 7.5 mg. Reminded to not get up without use of walker and assistance. IV leaking - Restarted. Much better day. Plans to go to a Rehab center when discharged.         Problem: Fall Risk (Adult)  Goal: Absence of Falls  Outcome: Ongoing (interventions implemented as appropriate)    Problem: Knee Replacement, Total (Adult)  Goal: Signs and Symptoms of Listed Potential Problems Will be Absent or Manageable (Knee Replacement, Total)  Outcome: Ongoing (interventions implemented as appropriate)

## 2017-07-15 NOTE — THERAPY DISCHARGE NOTE
Acute Care - Physical Therapy Treatment Note/Discharge  [facility]     Patient Name: Natalie Terrazas  : 1960  MRN: [id number]  Today's Date: 7/15/2017             Admit Date: 2017    Visit Dx:    ICD-10-CM ICD-9-CM   1. Difficulty walking R26.2 719.7     Patient Active Problem List   Diagnosis   • Osteoarthritis of knee       Physical Therapy Education     Title: PT OT SLP Therapies (Resolved)     Topic: Physical Therapy (Resolved)     Point: Mobility training (Resolved)    Learning Progress Summary    Learner Readiness Method Response Comment Documented by Status   Patient Acceptance E,TB,D VU,NR   17 1303 Done    Acceptance E,TB,D NR,VU   17 1648 Done    Eager E,TB VU   17 1751 Done    Acceptance E,D DU,NR   17 1634 Done    Acceptance E,TB,D DU,NR   17 1432 Done   Family Acceptance E,TB,D VU,NR   17 1303 Done               Point: Home exercise program (Resolved)    Learning Progress Summary    Learner Readiness Method Response Comment Documented by Status   Patient Acceptance E,TB,D VU,NR   17 1303 Done    Acceptance E,TB,D NR,VU   17 1648 Done    Eager E,TB VU   17 1751 Done    Acceptance E,TB,D DU,NR   17 1432 Done   Family Acceptance E,TB,D VU,NR   17 1303 Done               Point: Body mechanics (Resolved)    Learning Progress Summary    Learner Readiness Method Response Comment Documented by Status   Patient Acceptance E,TB,D VU,NR   17 1303 Done    Acceptance E,TB,D NR,VU   17 1648 Done    Eager E,TB VU   17 1751 Done    Acceptance E,D DU,NR   17 1634 Done    Acceptance E,TB,D DU,NR   17 1432 Done   Family Acceptance E,TB,D VU,NR   17 1303 Done               Point: Precautions (Resolved)    Learning Progress Summary    Learner Readiness Method Response Comment Documented by Status   Patient Acceptance E,TB,D VU,NR   17 1303 Done    Acceptance E,TB,D NR,VU    07/13/17 1648 Done    Eager E,TB VU  KB 07/12/17 1751 Done    Acceptance E,D DU,NR  DM 07/12/17 1634 Done    Acceptance E,TB,D DU,NR  DM 07/12/17 1432 Done   Family Acceptance E,TB,D VU,NR   07/14/17 1303 Done                      User Key     Initials Effective Dates Name Provider Type Discipline     10/06/15 -  Keerthi Garcia, PT Physical Therapist PT     02/18/16 -  Sofia Marino, PTA Physical Therapy Assistant PT     05/11/17 -  Shanta Cespedes, RN Extern Registered Nurse Nurse                    IP PT Goals       07/12/17 1433          Bed Mobility PT LTG    Bed Mobility PT LTG, Date Established (P)  07/12/17  -DM      Bed Mobility PT LTG, Time to Achieve (P)  2 days  -DM      Bed Mobility PT LTG, Activity Type (P)  all bed mobility  -DM      Bed Mobility PT LTG, [place] Level (P)  independent  -DM      Transfer Training PT LTG    Transfer Training PT LTG, Date Established (P)  07/12/17  -DM      Transfer Training PT LTG, Time to Achieve (P)  3 days  -DM      Transfer Training PT LTG, Activity Type (P)  all transfers  -DM      Transfer Training PT LTG, [place] Level (P)  independent  -DM      Transfer Training PT LTG, Assist Device (P)  walker, rolling  -DM      Gait Training PT LTG    Gait Training Goal PT LTG, Date Established (P)  07/12/17  -DM      Gait Training Goal PT LTG, Time to Achieve (P)  3 days  -DM      Gait Training Goal PT LTG, [place] Level (P)  supervision required  -DM      Gait Training Goal PT LTG, Assist Device (P)  walker, rolling  -DM      Gait Training Goal PT LTG, Distance to Achieve (P)  120  -DM      Stair Training PT LTG    Stair Training Goal PT LTG, Date Established (P)  07/12/17  -DM      Stair Training Goal PT LTG, Time to Achieve (P)  3 days  -DM      Stair Training Goal PT LTG, Number of Steps (P)  2  -DM      Stair Training Goal PT LTG, [place] Level (P)  contact guard assist  -DM      Stair Training Goal PT LTG, Assist Device (P)  1  "handrail   has \"handles\" to hold onto   -DM      Range of Motion PT LTG    Range of Motion Goal PT LTG, Date Established (P)  07/05/17  -DM      Range of Motion Goal PT LTG, Time to Achieve (P)  3 days  -DM      Range fo Motion Goal PT LTG, Joint (P)  L knee  -DM      Range of Motion Goal PT LTG, AROM Measure (P)  0-90  -DM        User Key  (r) = Recorded By, (t) = Taken By, (c) = Cosigned By    Initials Name Provider Type    DM Keerthi Garcia, PT Physical Therapist              Adult Rehabilitation Note       07/15/17 1030 07/14/17 1639 07/14/17 1300    Rehab Assessment/Intervention    Discipline physical therapist  -AVA physical therapy assistant  -JM physical therapy assistant  -    Document Type therapy note (daily note);discharge summary  -AVA therapy note (daily note)  - therapy note (daily note)  -    Subjective Information agree to therapy  -AVA agree to therapy;complains of;weakness;fatigue;pain;swelling  - agree to therapy;complains of;pain  -    Patient Effort, Rehab Treatment good  -AVA      Precautions/Limitations  fall precautions  - fall precautions  -    Recorded by [AVA] Sejal Riley, PT [JM] Sofia Marino PTA [JM] Sofia Marino PTA    Pain Assessment    Pain Assessment 0-10  -AVA 0-10  - 0-10  -    Pain Score 5  -AVA 7  -JM 7  -JM    Post Pain Score   8  -JM    Pain Type  Surgical pain  -JM Surgical pain  -    Pain Location Knee  -AVA Knee  -JM Knee  -    Pain Orientation Left  -AVA Left  -JM Left  -    Pain Intervention(s) Medication (See MAR);Repositioned  -AVA Medication (See MAR);Repositioned;Cold applied  - Medication (See MAR);Repositioned;Elevated  -    Response to Interventions  whitley  -JM nearly passed out in chair from pain, improved at end of session  -JM    Recorded by [AVA] Sejal Riley, PT [JM] Sofia Marino PTA [JM] Sofia Marino PTA    Cognitive Assessment/Intervention    Current Cognitive/Communication Assessment functional  -AVA      Orientation " Status oriented x 4  -AVA      Personal Safety WNL/WFL  -AVA      Personal Safety Interventions fall prevention program maintained;gait belt;muscle strengthening facilitated;nonskid shoes/slippers when out of bed  -AVA      Recorded by [AVA] Sejal Riley PT      ROM (Range of Motion)    General ROM Detail L: 9-76  -AVA  -5-75 (pn limiting)  -    Recorded by [AVA] Sejal Riley, PT  [JM] Sofia Marino PTA    Bed Mobility, Assessment/Treatment    Bed Mobility, Assistive Device bed rails;head of bed elevated  -AVA      Bed Mob, Supine to Sit, [place] contact guard assist;verbal cues required  -AVA      Bed Mob, Sit to Supine, [place] contact guard assist;verbal cues required  -AVA      Bed Mobility, Comment   in chair  -JM    Recorded by [AVA] Sejal Riley, PT  [JM] Sofia Marino PTA    Transfer Assessment/Treatment    Transfers, Sit-Stand [place] verbal cues required;nonverbal cues required (demo/gesture);contact guard assist  -AVA minimum assist (75% patient effort);verbal cues required;nonverbal cues required (demo/gesture)  - minimum assist (75% patient effort);moderate assist (50% patient effort)  -JM    Transfers, Stand-Sit [place] contact guard assist;verbal cues required  -AVA contact guard assist;verbal cues required  - contact guard assist;verbal cues required  -JM    Transfers, Sit-Stand-Sit, Assist Device rolling walker   her rollator  -AVA rolling walker   her rollator  -JM rolling walker   her rollator  -JM    Transfer, Impairments strength decreased;impaired balance  -AVA strength decreased;impaired balance  -JM strength decreased;impaired balance  -JM    Transfer, Comment  only one attempt this pm  -JM 2 attempts today also to fully stand  -JM    Recorded by [AVA] Sejal Riley, PT [JM] Sofia Marino PTA [JM] Sofia Marino PTA    Gait Assessment/Treatment    Gait, [place] Level verbal cues required;stand by assist  -AVA contact guard assist;verbal cues required   - contact guard assist;verbal cues required  -    Gait, Assistive Device rollator  -AVA rollator  - rollator  -    Gait, Distance (Feet) 75  -AVA 60  -JM 45  -JM    Gait, Gait Deviations antalgic;solo decreased;forward flexed posture  -AVA antalgic;solo decreased;forward flexed posture;step length decreased  - antalgic;solo decreased;forward flexed posture;step length decreased  -    Recorded by [AVA] Sejal Riley, PT [JM] Sofia Marino PTA [JM] Sofia Marino PTA    Therapy Exercises    Exercise Protocols total knee  -AVA total knee  -JM total knee  -JM    Total Knee Exercises left:;completed protocol;30 reps  -AVA left:;completed protocol;with assist;SLR;30 reps   to initiate  - left:;completed protocol;with assist;SLR;25 reps   to initiate  -JM    Recorded by [AVA] Sejal Riley, PT [JM] Sofia Marino PTA [JM] Sofia Marino PTA    Positioning and Restraints    Pre-Treatment Position sitting in chair/recliner  -AVA sitting in chair/recliner  - sitting in chair/recliner  -    Post Treatment Position chair  -AVA      In Chair reclined;call light within reach  -AVA reclined;call light within reach;encouraged to call for assist;with family/caregiver  - reclined;call light within reach;encouraged to call for assist;with family/caregiver   friend in room  -    Recorded by [AVA] Sejal Riley, PT [JM] Sofia Marino PTA [] Sofia Marino PTA      07/13/17 1645 07/13/17 1055 07/12/17 1626    Rehab Assessment/Intervention    Discipline physical therapy assistant  -CASTRO physical therapy assistant  -CASTRO physical therapist  -JOE    Document Type therapy note (daily note)  -CASTRO therapy note (daily note)  -CASTRO therapy note (daily note)  -JOE    Subjective Information agree to therapy;complains of;weakness;fatigue;pain  -CASTRO agree to therapy;complains of;weakness;fatigue;pain;swelling  -CASTRO no complaints;agree to therapy  -DM    Patient Effort, Rehab Treatment   good  -JOE     Precautions/Limitations fall precautions  -JM fall precautions  -JM fall precautions  -DM    Recorded by [JM] Sofia Marino PTA [JM] Sofia Marino PTA [DM] Keerthi Garcia, PT    Pain Assessment    Pain Assessment 0-10  -JM 0-10  -JM 0-10  -DM    Pain Score 8  -JM 4  -JM 1  -DM    Pain Type Surgical pain  -JM Surgical pain  -JM Acute pain  -DM    Pain Location Knee  -JM Knee  -JM Knee  -DM    Pain Orientation Left  -JM Left  -JM Left  -DM    Pain Intervention(s) Medication (See MAR);Repositioned;Cold applied  -JM Medication (See MAR);Repositioned;Cold applied  - Ambulation/increased activity;Cold applied;MD notified (Comment);Elevated  -DM    Response to Interventions whitley, unchanged  -JM whitley  -JM     Recorded by [CASTRO] Sofia Marino PTA [JM] Sofia Marino PTA [DM] Keerthi Garcia, PT    Vision Assessment/Intervention    Visual Impairment   WFL  -DM    Recorded by   [DM] Keerthi Garcia, PT    Cognitive Assessment/Intervention    Current Cognitive/Communication Assessment   functional  -DM    Orientation Status   oriented x 4  -DM    Follows Commands/Answers Questions   100% of the time  -DM    Personal Safety   WNL/WFL  -DM    Personal Safety Interventions   fall prevention program maintained;gait belt;nonskid shoes/slippers when out of bed;supervised activity  -DM    Recorded by   [DM] Keerthi Garcia, PT    ROM (Range of Motion)    General ROM Detail  -5-87 w/ace AROM  -JM     Recorded by  [JM] Sofia Marino PTA     Bed Mobility, Assessment/Treatment    Bed Mobility, Assistive Device   bed rails  -DM    Bed Mob, Supine to Sit, [place]   supervision required  -DM    Bed Mob, Sit to Supine, [place]   supervision required  -DM    Bed Mobility, Comment  in chair  -     Recorded by  [CASTRO] Sofia Marino PTA [DM] Keerthi Garcia, PT    Transfer Assessment/Treatment    Transfers, Sit-Stand [place] minimum assist (75% patient effort);moderate assist (50% patient effort)  - minimum assist (75%  patient effort);moderate assist (50% patient effort)  -JM contact guard assist  -DM    Transfers, Stand-Sit [place] contact guard assist;verbal cues required  -JM contact guard assist;verbal cues required  -JM contact guard assist  -DM    Transfers, Sit-Stand-Sit, Assist Device rolling walker   her rollator  -JM rolling walker   her rollator  -JM rolling walker  -DM    Transfer, Impairments strength decreased;impaired balance  -JM strength decreased;impaired balance  -JM     Transfer, Comment 2 attempts to stand  -JM      Recorded by [JM] Sofia Marino PTA [JM] Sofia Marino PTA [DM] Keerthi Garcia, PT    Gait Assessment/Treatment    Gait, [place] Level contact guard assist;verbal cues required  -JM contact guard assist;verbal cues required  -JM contact guard assist;verbal cues required  -DM    Gait, Assistive Device rollator  -JM rollator  -JM rolling walker  -DM    Gait, Distance (Feet) 35  -JM 20  -  -DM    Gait, Comment pain /fatigue limiting  -JM increased bleeding limiting dist  -JM [place] is PLOF  -DM    Recorded by [JM] Sofia Marino PTA [JM] Sofia Marino PTA [DM] Keerthi Garcia, PT    Therapy Exercises    Exercise Protocols total knee  -JM total knee  -JM     Total Knee Exercises left:;completed protocol;with assist;SLR;20 reps   to initiate  -JM left:;15 reps;completed protocol;with assist;SLR  -JM     Recorded by [JM] Sofia Marino PTA [JM] Sofia Marino PTA     Positioning and Restraints    Pre-Treatment Position sitting in chair/recliner  -JM sitting in chair/recliner  -JM in bed  -DM    Post Treatment Position   bed  -DM    In Bed   notified nsg;fowlers;call light within reach;encouraged to call for assist;exit alarm on;LLE elevated   ice  -DM    In Chair reclined;call light within reach;encouraged to call for assist;notified nsg   nsg to assist to BR  -JM reclined;call light within reach;encouraged to call for assist;with family/caregiver  -JM     Recorded by  [CASTRO] Sofia Marino, NICOLAS [JM] Sofia Marino PTA [DM] Keerthi Garcia, PT      User Key  (r) = Recorded By, (t) = Taken By, (c) = Cosigned By    Initials Name Effective Dates    AVA Riley, PT 10/06/15 -     DM Keerthi Garcia, PT 10/06/15 -     CASTRO Marino, NICOLAS 02/18/16 -           PT Recommendation and Plan  Anticipated Discharge Disposition: home with assist, home with home health  Planned Therapy Interventions: balance training, bed mobility training, gait training, home exercise program, patient/family education, ROM (Range of Motion), strengthening, stair training, transfer training  PT Frequency: 2 times/day             Outcome Measures       07/15/17 1600 07/14/17 1300 07/13/17 1700    How much help from another person do you currently need...    Turning from your back to your side while in flat bed without using bedrails? 3  -AVA 3  -JM 3  -JM    Moving from lying on back to sitting on the side of a flat bed without bedrails? 3  -AVA 2  -JM 2  -JM    Moving to and from a bed to a chair (including a wheelchair)? 3  -AVA 3  -JM 3  -JM    Standing up from a chair using your arms (e.g., wheelchair, bedside chair)? 3  -AVA 2  -JM 2  -JM    Climbing 3-5 steps with a railing? 2  -AVA 1  -JM 1  -JM    To walk in hospital room? 3  -AVA 3  -JM 3  -JM    AM-PAC 6 Clicks Score 17  -AVA 14  -JM 14  -JM    Functional Assessment    Outcome Measure Options AM-PAC 6 Clicks Basic Mobility (PT)  -AVA        User Key  (r) = Recorded By, (t) = Taken By, (c) = Cosigned By    Initials Name Provider Type    AVA BUSBY Rosemary, PT Physical Therapist    CASTRO Marino, NICOLAS Physical Therapy Assistant           Time Calculation:         PT Charges       07/15/17 1606          Time Calculation    Start Time 1030  -AVA      Stop Time 1130  -AVA      Time Calculation (min) 60 min  -AVA      PT Received On 07/15/17  -AVA        User Key  (r) = Recorded By, (t) = Taken By, (c) = Cosigned By    Initials Name Provider Type    AVA  Sejal Riley, PT Physical Therapist          Therapy Charges for Today     Code Description Service Date Service Provider Modifiers Qty    76383577275 HC PT THER PROC GROUP 7/15/2017 Sejal Riley, PT GP 1    25193397671 HC PT THER PROC EA 15 MIN 7/15/2017 Sejal Riley, PT GP 1          PT G-Codes  Outcome Measure Options: AM-PAC 6 Clicks Basic Mobility (PT)    PT Discharge Summary  Reason for Discharge: Discharge from facility  Outcomes Achieved: Patient able to partially acheive established goals  Discharge Destination: SNF    Sejal Riley, PT  7/15/2017

## 2017-07-15 NOTE — PROGRESS NOTES
Orthopedic Total Progress Note        Patient: Natalie Terrazas    Date of Admission: 7/12/2017  6:04 AM    YOB: 1960    Medical Record Number: [id number]    Attending Physician: Rakesh Velasco MD      POD # 3     Status post: CA TOTAL KNEE ARTHROPLASTY [46671] (LT TOTAL KNEE ARTHROPLASTY)      Systemic or Specific Complaints: No Complaints      Allergies   Allergen Reactions   • Naproxen Swelling   • Nickel          Current Medications:  Scheduled Meds:  atenolol 25 mg Oral Daily   DULoxetine 30 mg Oral Daily   DULoxetine 60 mg Oral Daily   levothyroxine 175 mcg Oral Daily   sennosides-docusate sodium 2 tablet Oral BID   warfarin 6 mg Oral Daily     Continuous Infusions:  lactated ringers 100 mL/hr Last Rate: 100 mL/hr (07/13/17 0428)     PRN Meds:.bisacodyl  •  diphenhydrAMINE  •  docusate sodium  •  HYDROmorphone **AND** naloxone  •  magnesium hydroxide  •  ondansetron  •  oxyCODONE-acetaminophen      Physical Exam: 56 y.o. female  General Appearance:    alert and oriented                Pain Relief: Patient reports some relief       Vitals:    07/14/17 1500 07/14/17 1934 07/14/17 2317 07/15/17 0355   BP: 125/75 127/74 124/71 141/74   BP Location: Right arm Left arm Left arm Left arm   Patient Position: Lying Lying Lying Lying   Pulse: 80 83 89 94   Resp: 18 18 18 18   Temp: 98.5 °F (36.9 °C) 98.2 °F (36.8 °C) 98.5 °F (36.9 °C) 98.3 °F (36.8 °C)   TempSrc: Oral Oral Oral Oral   SpO2: 93% 93% 95% 90%   Weight:       Height:               Extremities:   Operative extremity neurovascular status intact. ROM intact.    Incision intact w/out signs or  symptoms of infection. No           edema, no cyanosis, no calf tenderness     Pulses:     Pulses palpable and equal bilaterally     Skin:     Skin Warm/Dry w/out ulceration, ecchymosis, rash, or   cyanosis     Activity: Mobilizing Per P.T.       Diagnostic Tests:   Admission on 07/12/2017   Component Date Value Ref Range Status   • Hemoglobin 07/12/2017 12.3   11.9 - 15.5 g/dL Final   • Hematocrit 07/12/2017 38.8  35.6 - 45.5 % Final   • Glucose 07/13/2017 103* 65 - 99 mg/dL Final   • BUN 07/13/2017 15  6 - 20 mg/dL Final   • Creatinine 07/13/2017 1.07* 0.57 - 1.00 mg/dL Final   • Sodium 07/13/2017 134* 136 - 145 mmol/L Final   • Potassium 07/13/2017 4.4  3.5 - 5.2 mmol/L Final   • Chloride 07/13/2017 98  98 - 107 mmol/L Final   • CO2 07/13/2017 23.1  22.0 - 29.0 mmol/L Final   • Calcium 07/13/2017 8.2* 8.6 - 10.5 mg/dL Final   • eGFR Non African Amer 07/13/2017 53* >60 mL/min/1.73 Final   • BUN/Creatinine Ratio 07/13/2017 14.0  7.0 - 25.0 Final   • Anion Gap 07/13/2017 12.9  mmol/L Final   • Hemoglobin 07/13/2017 10.4* 11.9 - 15.5 g/dL Final   • Hematocrit 07/13/2017 33.1* 35.6 - 45.5 % Final   • Protime 07/13/2017 16.8* 11.7 - 14.2 Seconds Final   • INR 07/13/2017 1.41* 0.90 - 1.10 Final   • Hemoglobin 07/14/2017 10.9* 11.9 - 15.5 g/dL Final   • Hematocrit 07/14/2017 34.0* 35.6 - 45.5 % Final   • Protime 07/14/2017 18.3* 11.7 - 14.2 Seconds Final   • INR 07/14/2017 1.58* 0.90 - 1.10 Final   • Hemoglobin 07/15/2017 10.0* 11.9 - 15.5 g/dL Final   • Hematocrit 07/15/2017 31.1* 35.6 - 45.5 % Final   • Protime 07/15/2017 20.7* 11.7 - 14.2 Seconds Final   • INR 07/15/2017 1.85* 0.90 - 1.10 Final       Xr Chest Pa & Lateral    Result Date: 7/7/2017  Narrative: PA AND LATERAL CHEST X-RAY  HISTORY: Prior breast cancer status post right mastectomy. Hypertension controlled with medication. Preop knee replacement.  TECHNIQUE: Chest x-ray consisting of PA and lateral view is provided.   COMPARISON: None.  FINDINGS: The cardiomediastinal silhouette is normal. The lungs are clear. The costophrenic sulci are dry and the bones appear normal. There is no pneumothorax.      Impression: Negative.  This report was finalized on 7/7/2017 1:54 PM by Dr. Chris Land MD.          Assessment:  Patient Active Problem List   Diagnosis   • Osteoarthritis of knee     Post-operative  Pain  Immobility    Plan:    Continue Physical Therapy, increase mobility as tolerated.  Continue SCDs, Continue DVT prophalaxis.  Continue Pain management efforts  Continue Incisional care      Discharge Plan: today to SNF    Date: 7/15/2017   Time: 6:53 AM    Rakesh Velasco MD

## 2017-07-15 NOTE — DISCHARGE SUMMARY
This is a discharge summary on Natalie Terrazas.  Needed admission was 7/12/17 date of discharge is 7/15/17.  Main diagnosis was primary localized osteoarthritis the left knee.  Discharge diagnosis is same.  Hospital left total knee.  As per course this patient was admitted on 712 taken to the operating room for left total knee replacement.  Postoperatively she has done well she's been up with physical therapy is ambulating short distances.  So she can be discharged today to the skilled nursing facility.  Coumadin for DVT prophylaxis will have pro times done on Monday and Thursday of next week.  Condition on discharge is improved in her disposition is to the [facility] Dr. Martinez dictating thank you

## 2017-07-15 NOTE — SIGNIFICANT NOTE
07/15/17 1606   PT Discharge Summary   Reason for Discharge Discharge from facility   Outcomes Achieved Patient able to partially acheive established goals   Discharge Destination SNF

## 2017-07-15 NOTE — PLAN OF CARE
Problem: Patient Care Overview (Adult)  Goal: Plan of Care Review  Outcome: Ongoing (interventions implemented as appropriate)    07/15/17 0349   Coping/Psychosocial Response Interventions   Plan Of Care Reviewed With patient   Patient Care Overview   Progress improving   Outcome Evaluation   Outcome Summary/Follow up Plan Vitals as charted, pt pain better controlled with PRN percocet q4hr, will continue to monitor.        Goal: Adult Individualization and Mutuality  Outcome: Ongoing (interventions implemented as appropriate)  Goal: Discharge Needs Assessment  Outcome: Ongoing (interventions implemented as appropriate)    Problem: Fall Risk (Adult)  Goal: Absence of Falls  Outcome: Ongoing (interventions implemented as appropriate)    Problem: Knee Replacement, Total (Adult)  Goal: Signs and Symptoms of Listed Potential Problems Will be Absent or Manageable (Knee Replacement, Total)  Outcome: Ongoing (interventions implemented as appropriate)

## 2017-12-22 ENCOUNTER — HOSPITAL ENCOUNTER (OUTPATIENT)
Dept: GENERAL RADIOLOGY | Facility: HOSPITAL | Age: 57
Discharge: HOME | End: 2017-12-22
Admitting: ORTHOPAEDIC SURGERY

## 2017-12-22 ENCOUNTER — APPOINTMENT (OUTPATIENT)
Dept: PREADMISSION TESTING | Facility: HOSPITAL | Age: 57
End: 2017-12-22

## 2017-12-22 VITALS
TEMPERATURE: 98.8 F | SYSTOLIC BLOOD PRESSURE: 133 MMHG | WEIGHT: 263 LBS | RESPIRATION RATE: 20 BRPM | BODY MASS INDEX: 44.9 KG/M2 | DIASTOLIC BLOOD PRESSURE: 81 MMHG | OXYGEN SATURATION: 96 % | HEART RATE: 84 BPM | HEIGHT: 64 IN

## 2017-12-22 LAB
ABO GROUP BLD: NORMAL
ANION GAP SERPL CALCULATED.3IONS-SCNC: 12.3 MMOL/L
BILIRUB UR QL STRIP: NEGATIVE
BLD GP AB SCN SERPL QL: NEGATIVE
BUN BLD-MCNC: 13 MG/DL (ref 6–20)
BUN/CREAT SERPL: 14.8 (ref 7–25)
CALCIUM SPEC-SCNC: 9.2 MG/DL (ref 8.6–10.5)
CHLORIDE SERPL-SCNC: 102 MMOL/L (ref 98–107)
CLARITY UR: CLEAR
CO2 SERPL-SCNC: 25.7 MMOL/L (ref 22–29)
COLOR UR: YELLOW
CREAT BLD-MCNC: 0.88 MG/DL (ref 0.57–1)
DEPRECATED RDW RBC AUTO: 49.1 FL (ref 37–54)
ERYTHROCYTE [DISTWIDTH] IN BLOOD BY AUTOMATED COUNT: 15.1 % (ref 11.7–13)
GFR SERPL CREATININE-BSD FRML MDRD: 66 ML/MIN/1.73
GLUCOSE BLD-MCNC: 98 MG/DL (ref 65–99)
GLUCOSE UR STRIP-MCNC: NEGATIVE MG/DL
HCT VFR BLD AUTO: 40.2 % (ref 35.6–45.5)
HGB BLD-MCNC: 13.1 G/DL (ref 11.9–15.5)
HGB UR QL STRIP.AUTO: NEGATIVE
INR PPP: 1.1 (ref 0.9–1.1)
KETONES UR QL STRIP: NEGATIVE
LEUKOCYTE ESTERASE UR QL STRIP.AUTO: NEGATIVE
MCH RBC QN AUTO: 29 PG (ref 26.9–32)
MCHC RBC AUTO-ENTMCNC: 32.6 G/DL (ref 32.4–36.3)
MCV RBC AUTO: 89.1 FL (ref 80.5–98.2)
NITRITE UR QL STRIP: NEGATIVE
PH UR STRIP.AUTO: 6 [PH] (ref 5–8)
PLATELET # BLD AUTO: 303 10*3/MM3 (ref 140–500)
PMV BLD AUTO: 10.7 FL (ref 6–12)
POTASSIUM BLD-SCNC: 4.2 MMOL/L (ref 3.5–5.2)
PROT UR QL STRIP: NEGATIVE
PROTHROMBIN TIME: 13.8 SECONDS (ref 11.7–14.2)
RBC # BLD AUTO: 4.51 10*6/MM3 (ref 3.9–5.2)
RH BLD: POSITIVE
SODIUM BLD-SCNC: 140 MMOL/L (ref 136–145)
SP GR UR STRIP: 1.02 (ref 1–1.03)
UROBILINOGEN UR QL STRIP: NORMAL
WBC NRBC COR # BLD: 4.98 10*3/MM3 (ref 4.5–10.7)

## 2017-12-22 PROCEDURE — 86850 RBC ANTIBODY SCREEN: CPT | Performed by: ORTHOPAEDIC SURGERY

## 2017-12-22 PROCEDURE — 81003 URINALYSIS AUTO W/O SCOPE: CPT | Performed by: ORTHOPAEDIC SURGERY

## 2017-12-22 PROCEDURE — 85610 PROTHROMBIN TIME: CPT | Performed by: ORTHOPAEDIC SURGERY

## 2017-12-22 PROCEDURE — 36415 COLL VENOUS BLD VENIPUNCTURE: CPT

## 2017-12-22 PROCEDURE — 86900 BLOOD TYPING SEROLOGIC ABO: CPT | Performed by: ORTHOPAEDIC SURGERY

## 2017-12-22 PROCEDURE — 85027 COMPLETE CBC AUTOMATED: CPT | Performed by: ORTHOPAEDIC SURGERY

## 2017-12-22 PROCEDURE — 86901 BLOOD TYPING SEROLOGIC RH(D): CPT | Performed by: ORTHOPAEDIC SURGERY

## 2017-12-22 PROCEDURE — 80048 BASIC METABOLIC PNL TOTAL CA: CPT | Performed by: ORTHOPAEDIC SURGERY

## 2017-12-22 PROCEDURE — 71020 HC CHEST PA AND LATERAL: CPT

## 2017-12-22 RX ORDER — LEVOTHYROXINE SODIUM 0.2 MG/1
200 TABLET ORAL EVERY MORNING
COMMUNITY

## 2017-12-22 ASSESSMENT — KOOS JR
KOOS JR SCORE: 31.307
KOOS JR SCORE: 22

## 2017-12-22 NOTE — DISCHARGE INSTRUCTIONS
Take the following medications the morning of surgery with a small sip of water:  Atenolol, pain med if needed        General Instructions:  • Do not eat solid food after midnight the night before surgery.  • You may drink clear liquids day of surgery but must stop at least one hour before your hospital arrival time.  • It is beneficial for you to have a clear drink that contains carbohydrates the day of surgery.  We suggest a 12 to 20 ounce bottle of Gatorade or Powerade for non-diabetic patients or a 12 to 20 ounce bottle of G2 or Powerade Zero for diabetic patients. (Pediatric patients, are not advised to drink a 12 to 20 ounce carbohydrate drink)    Clear liquids are liquids you can see through.  Nothing red in color.     Plain water                               Sports drinks  Sodas                                   Gelatin (Jell-O)  Fruit juices without pulp such as white grape juice and apple juice  Popsicles that contain no fruit or yogurt  Tea or coffee (no cream or milk added)  Gatorade / Powerade  G2 / Powerade Zero    • Infants may have breast milk up to four hours before surgery.  • Infants drinking formula may drink formula up to six hours before surgery.   • Patients who avoid smoking, chewing tobacco and alcohol for 4 weeks prior to surgery have a reduced risk of post-operative complications.  Quit smoking as many days before surgery as you can.  • Do not smoke, use chewing tobacco or drink alcohol the day of surgery.   • If applicable bring your C-PAP/ BI-PAP machine.  • Bring any papers given to you in the doctor’s office.  • Wear clean comfortable clothes and socks.  • Do not wear contact lenses or make-up.  Bring a case for your glasses.   • Bring crutches or walker if applicable.  • Remove all piercings.  Leave jewelry and any other valuables at home.  • The Pre-Admission Testing nurse will instruct you to bring medications if unable to obtain an accurate list in Pre-Admission Testing.        If  you were given a blood bank ID arm band remember to bring it with you the day of surgery.    Preventing a Surgical Site Infection:  • For 2 to 3 days before surgery, avoid shaving with a razor because the razor can irritate skin and make it easier to develop an infection.  • The night prior to surgery sleep in a clean bed with clean clothing.  Do not allow pets to sleep with you.  • Shower on the morning of surgery using a fresh bar of anti-bacterial soap (such as Dial) and clean washcloth.  Dry with a clean towel and dress in clean clothing.  • Ask your surgeon if you will be receiving antibiotics prior to surgery.  • Make sure you, your family, and all healthcare providers clean their hands with soap and water or an alcohol based hand  before caring for you or your wound.    Day of surgery:  Upon arrival, a Pre-op nurse and Anesthesiologist will review your health history, obtain vital signs, and answer questions you may have.  The only belongings needed at this time will be your home medications and if applicable your C-PAP/BI-PAP machine.  If you are staying overnight your family can leave the rest of your belongings in the car and bring them to your room later.  A Pre-op nurse will start an IV and you may receive medication in preparation for surgery, including something to help you relax.  Your family will be able to see you in the Pre-op area.  While you are in surgery your family should notify the waiting room  if they leave the waiting room area and provide a contact phone number.    Please be aware that surgery does come with discomfort.  We want to make every effort to control your discomfort so please discuss any uncontrolled symptoms with your nurse.   Your doctor will most likely have prescribed pain medications.      If you are going home after surgery you will receive individualized written care instructions before being discharged.  A responsible adult must drive you to and from  the hospital on the day of your surgery and stay with you for 24 hours.    If you are staying overnight following surgery, you will be transported to your hospital room following the recovery period.  [facility]    If you have any questions please call Pre-Admission Testing at [phone].  Deductibles and co-payments are collected on the day of service. Please be prepared to pay the required co-pay, deductible or deposit on the day of service as defined by your plan.    2% CHLORAHEXIDINE GLUCONATE* CLOTH  Preparing or “prepping” skin before surgery can reduce the risk of infection at the surgical site. To make the process easier, [facility] has chosen disposable cloths moistened with a rinse-free, 2% Chlorhexidine Gluconate (CHG) antiseptic solution. The steps below outline the prepping process and should be carefully followed.        Use the prep cloth on the area that is circled in the diagram             Directions Night before Surgery  1) Shower using a fresh bar of anti-bacterial soap (such as Dial) and clean washcloth.  Use a clean towel to completely dry your skin.  2) Do not use any lotions, oils or creams on your skin.  3) Open the package and remove 1 cloth, wipe your skin for 30 seconds in a circular motion.  Allow to dry for 3 minutes.  4) Repeat #3 with second cloth.  5) Do not touch your eyes, ears, or mouth with the prep cloth.  6) Allow the wet prep solution to air dry.  7) Discard the prep cloth and wash your hands with soap and water.   8) Dress in clean bed clothes and sleep on fresh clean bed sheets.   9) You may experience some temporary itching after the prep.    Directions Day of Surgery  1) Repeat steps 1,2,3,4,5,6,7, and 9.   2) Dress in clean clothes before coming to the hospital.    BACTROBAN NASAL OINTMENT  There are many germs normally in your nose. Bactroban is an ointment that will help reduce these germs. Please follow these  instructions for Bactroban use:    ____Two days before surgery in the evening Date________    ____The day before surgery in the morning  Date________    ____The day before surgery in the evening              Date________    ____The day of surgery in the morning    Date________    **Squirt ½ package of Bactroban Ointment onto a cotton applicator and apply to inside of 1st nostril.  Squirt the remaining Bactroban and apply to the inside of the other nostril.    PERIDEX- ORAL:  Use only if your surgeon has ordered  Use the night before and morning of surgery - Swish, gargle, and spit - do not swallow.

## 2018-01-03 ENCOUNTER — ANESTHESIA (OUTPATIENT)
Dept: PERIOP | Facility: HOSPITAL | Age: 58
End: 2018-01-03

## 2018-01-03 ENCOUNTER — ANESTHESIA EVENT (OUTPATIENT)
Dept: PERIOP | Facility: HOSPITAL | Age: 58
End: 2018-01-03

## 2018-01-03 ENCOUNTER — HOSPITAL ENCOUNTER (INPATIENT)
Facility: HOSPITAL | Age: 58
LOS: 3 days | Discharge: HOME HEALTH | End: 2018-01-06
Attending: ORTHOPAEDIC SURGERY | Admitting: ORTHOPAEDIC SURGERY

## 2018-01-03 DIAGNOSIS — Z96.651 S/P TOTAL KNEE ARTHROPLASTY, RIGHT: Primary | ICD-10-CM

## 2018-01-03 PROBLEM — F32.A ANXIETY AND DEPRESSION: Chronic | Status: ACTIVE | Noted: 2018-01-03

## 2018-01-03 PROBLEM — E07.9 DISEASE OF THYROID GLAND: Chronic | Status: ACTIVE | Noted: 2018-01-03

## 2018-01-03 PROBLEM — F41.9 ANXIETY AND DEPRESSION: Chronic | Status: ACTIVE | Noted: 2018-01-03

## 2018-01-03 PROBLEM — E66.01 MORBID OBESITY WITH BMI OF 45.0-49.9, ADULT (HCC): Chronic | Status: ACTIVE | Noted: 2018-01-03

## 2018-01-03 PROBLEM — M17.11 PRIMARY OSTEOARTHRITIS OF RIGHT KNEE: Status: ACTIVE | Noted: 2018-01-03

## 2018-01-03 PROBLEM — I10 HYPERTENSION: Chronic | Status: ACTIVE | Noted: 2018-01-03

## 2018-01-03 PROCEDURE — 25010000002 ROPIVACAINE PER 1 MG: Performed by: ANESTHESIOLOGY

## 2018-01-03 PROCEDURE — 97110 THERAPEUTIC EXERCISES: CPT

## 2018-01-03 PROCEDURE — 25010000002 CLONIDINE PER 1 MG: Performed by: ORTHOPAEDIC SURGERY

## 2018-01-03 PROCEDURE — 25010000002 KETOROLAC TROMETHAMINE PER 15 MG: Performed by: ORTHOPAEDIC SURGERY

## 2018-01-03 PROCEDURE — 25010000002 FENTANYL CITRATE (PF) 100 MCG/2ML SOLUTION: Performed by: NURSE ANESTHETIST, CERTIFIED REGISTERED

## 2018-01-03 PROCEDURE — 0SRC0J9 REPLACEMENT OF RIGHT KNEE JOINT WITH SYNTHETIC SUBSTITUTE, CEMENTED, OPEN APPROACH: ICD-10-PCS | Performed by: ORTHOPAEDIC SURGERY

## 2018-01-03 PROCEDURE — 25010000002 DEXAMETHASONE PER 1 MG: Performed by: NURSE ANESTHETIST, CERTIFIED REGISTERED

## 2018-01-03 PROCEDURE — 25010000002 FENTANYL CITRATE (PF) 100 MCG/2ML SOLUTION: Performed by: ANESTHESIOLOGY

## 2018-01-03 PROCEDURE — 25010000002 MIDAZOLAM PER 1 MG: Performed by: ANESTHESIOLOGY

## 2018-01-03 PROCEDURE — 97162 PT EVAL MOD COMPLEX 30 MIN: CPT

## 2018-01-03 PROCEDURE — 25010000003 CEFAZOLIN IN DEXTROSE 2-4 GM/100ML-% SOLUTION: Performed by: ORTHOPAEDIC SURGERY

## 2018-01-03 PROCEDURE — 25010000002 METHYLPREDNISOLONE PER 125 MG: Performed by: ANESTHESIOLOGY

## 2018-01-03 PROCEDURE — C1713 ANCHOR/SCREW BN/BN,TIS/BN: HCPCS | Performed by: ORTHOPAEDIC SURGERY

## 2018-01-03 PROCEDURE — 25010000002 HYDROMORPHONE PER 4 MG: Performed by: ORTHOPAEDIC SURGERY

## 2018-01-03 PROCEDURE — 25010000002 EPINEPHRINE PER 0.1 MG: Performed by: ORTHOPAEDIC SURGERY

## 2018-01-03 PROCEDURE — 25010000002 HYDROMORPHONE PER 4 MG: Performed by: NURSE ANESTHETIST, CERTIFIED REGISTERED

## 2018-01-03 PROCEDURE — 25010000002 ROPIVACAINE PER 1 MG: Performed by: ORTHOPAEDIC SURGERY

## 2018-01-03 PROCEDURE — 25010000002 ONDANSETRON PER 1 MG: Performed by: NURSE ANESTHETIST, CERTIFIED REGISTERED

## 2018-01-03 PROCEDURE — 25010000002 PROPOFOL 10 MG/ML EMULSION: Performed by: NURSE ANESTHETIST, CERTIFIED REGISTERED

## 2018-01-03 PROCEDURE — 25010000002 NEOSTIGMINE PER 0.5 MG: Performed by: NURSE ANESTHETIST, CERTIFIED REGISTERED

## 2018-01-03 PROCEDURE — C1776 JOINT DEVICE (IMPLANTABLE): HCPCS | Performed by: ORTHOPAEDIC SURGERY

## 2018-01-03 DEVICE — LEGION CRUCIATE RETAINING OXINIUM                                    FEMORAL SIZE 4 RIGHT
Type: IMPLANTABLE DEVICE | Site: KNEE | Status: FUNCTIONAL
Brand: LEGION

## 2018-01-03 DEVICE — LEGION CR HIGH FLEX XLPE SZ 3-4 10MM
Type: IMPLANTABLE DEVICE | Site: KNEE | Status: FUNCTIONAL
Brand: LEGION

## 2018-01-03 DEVICE — CMT BONE SIMPLEX HV FUL DOSE: Type: IMPLANTABLE DEVICE | Site: KNEE | Status: FUNCTIONAL

## 2018-01-03 DEVICE — GEN II 7.5MM RESUR PAT 32MM
Type: IMPLANTABLE DEVICE | Site: KNEE | Status: FUNCTIONAL
Brand: GENESIS II

## 2018-01-03 DEVICE — IMPLANTABLE DEVICE: Type: IMPLANTABLE DEVICE | Site: KNEE | Status: FUNCTIONAL

## 2018-01-03 RX ORDER — SODIUM CHLORIDE 0.9 % (FLUSH) 0.9 %
1-10 SYRINGE (ML) INJECTION AS NEEDED
Status: DISCONTINUED | OUTPATIENT
Start: 2018-01-03 | End: 2018-01-06 | Stop reason: HOSPADM

## 2018-01-03 RX ORDER — ONDANSETRON 4 MG/1
4 TABLET, FILM COATED ORAL EVERY 6 HOURS PRN
Status: DISCONTINUED | OUTPATIENT
Start: 2018-01-03 | End: 2018-01-06 | Stop reason: HOSPADM

## 2018-01-03 RX ORDER — DOCUSATE SODIUM 100 MG/1
100 CAPSULE, LIQUID FILLED ORAL 2 TIMES DAILY PRN
Status: DISCONTINUED | OUTPATIENT
Start: 2018-01-03 | End: 2018-01-06 | Stop reason: HOSPADM

## 2018-01-03 RX ORDER — MIDAZOLAM HYDROCHLORIDE 1 MG/ML
2 INJECTION INTRAMUSCULAR; INTRAVENOUS
Status: DISCONTINUED | OUTPATIENT
Start: 2018-01-03 | End: 2018-01-06 | Stop reason: HOSPADM

## 2018-01-03 RX ORDER — FENTANYL CITRATE 50 UG/ML
50 INJECTION, SOLUTION INTRAMUSCULAR; INTRAVENOUS
Status: DISCONTINUED | OUTPATIENT
Start: 2018-01-03 | End: 2018-01-06 | Stop reason: HOSPADM

## 2018-01-03 RX ORDER — CEFAZOLIN SODIUM 2 G/100ML
2 INJECTION, SOLUTION INTRAVENOUS ONCE
Status: COMPLETED | OUTPATIENT
Start: 2018-01-03 | End: 2018-01-03

## 2018-01-03 RX ORDER — PROMETHAZINE HYDROCHLORIDE 25 MG/1
12.5 TABLET ORAL ONCE AS NEEDED
Status: DISCONTINUED | OUTPATIENT
Start: 2018-01-03 | End: 2018-01-03 | Stop reason: HOSPADM

## 2018-01-03 RX ORDER — HYDROCODONE BITARTRATE AND ACETAMINOPHEN 7.5; 325 MG/1; MG/1
1 TABLET ORAL ONCE AS NEEDED
Status: DISCONTINUED | OUTPATIENT
Start: 2018-01-03 | End: 2018-01-03 | Stop reason: HOSPADM

## 2018-01-03 RX ORDER — ROCURONIUM BROMIDE 10 MG/ML
INJECTION, SOLUTION INTRAVENOUS AS NEEDED
Status: DISCONTINUED | OUTPATIENT
Start: 2018-01-03 | End: 2018-01-03 | Stop reason: SURG

## 2018-01-03 RX ORDER — HYDRALAZINE HYDROCHLORIDE 20 MG/ML
5 INJECTION INTRAMUSCULAR; INTRAVENOUS
Status: DISCONTINUED | OUTPATIENT
Start: 2018-01-03 | End: 2018-01-03 | Stop reason: HOSPADM

## 2018-01-03 RX ORDER — HYDROMORPHONE HYDROCHLORIDE 1 MG/ML
0.5 INJECTION, SOLUTION INTRAMUSCULAR; INTRAVENOUS; SUBCUTANEOUS
Status: DISCONTINUED | OUTPATIENT
Start: 2018-01-03 | End: 2018-01-06 | Stop reason: HOSPADM

## 2018-01-03 RX ORDER — SODIUM CHLORIDE, SODIUM LACTATE, POTASSIUM CHLORIDE, CALCIUM CHLORIDE 600; 310; 30; 20 MG/100ML; MG/100ML; MG/100ML; MG/100ML
9 INJECTION, SOLUTION INTRAVENOUS CONTINUOUS
Status: DISCONTINUED | OUTPATIENT
Start: 2018-01-03 | End: 2018-01-06 | Stop reason: HOSPADM

## 2018-01-03 RX ORDER — METHYLPREDNISOLONE SODIUM SUCCINATE 125 MG/2ML
INJECTION, POWDER, LYOPHILIZED, FOR SOLUTION INTRAMUSCULAR; INTRAVENOUS AS NEEDED
Status: DISCONTINUED | OUTPATIENT
Start: 2018-01-03 | End: 2018-01-03 | Stop reason: SURG

## 2018-01-03 RX ORDER — ONDANSETRON 2 MG/ML
4 INJECTION INTRAMUSCULAR; INTRAVENOUS EVERY 6 HOURS PRN
Status: DISCONTINUED | OUTPATIENT
Start: 2018-01-03 | End: 2018-01-06 | Stop reason: HOSPADM

## 2018-01-03 RX ORDER — NALOXONE HCL 0.4 MG/ML
0.1 VIAL (ML) INJECTION
Status: DISCONTINUED | OUTPATIENT
Start: 2018-01-03 | End: 2018-01-06 | Stop reason: HOSPADM

## 2018-01-03 RX ORDER — MIDAZOLAM HYDROCHLORIDE 1 MG/ML
1 INJECTION INTRAMUSCULAR; INTRAVENOUS
Status: DISCONTINUED | OUTPATIENT
Start: 2018-01-03 | End: 2018-01-06 | Stop reason: HOSPADM

## 2018-01-03 RX ORDER — DEXAMETHASONE SODIUM PHOSPHATE 10 MG/ML
INJECTION INTRAMUSCULAR; INTRAVENOUS AS NEEDED
Status: DISCONTINUED | OUTPATIENT
Start: 2018-01-03 | End: 2018-01-03 | Stop reason: SURG

## 2018-01-03 RX ORDER — OXYCODONE AND ACETAMINOPHEN 7.5; 325 MG/1; MG/1
1 TABLET ORAL ONCE AS NEEDED
Status: DISCONTINUED | OUTPATIENT
Start: 2018-01-03 | End: 2018-01-03 | Stop reason: HOSPADM

## 2018-01-03 RX ORDER — SODIUM CHLORIDE 9 MG/ML
INJECTION, SOLUTION INTRAVENOUS AS NEEDED
Status: DISCONTINUED | OUTPATIENT
Start: 2018-01-03 | End: 2018-01-03 | Stop reason: HOSPADM

## 2018-01-03 RX ORDER — LEFLUNOMIDE 20 MG/1
20 TABLET ORAL
COMMUNITY

## 2018-01-03 RX ORDER — BISACODYL 10 MG
10 SUPPOSITORY, RECTAL RECTAL DAILY PRN
Status: DISCONTINUED | OUTPATIENT
Start: 2018-01-03 | End: 2018-01-06 | Stop reason: HOSPADM

## 2018-01-03 RX ORDER — FENTANYL CITRATE 50 UG/ML
INJECTION, SOLUTION INTRAMUSCULAR; INTRAVENOUS AS NEEDED
Status: DISCONTINUED | OUTPATIENT
Start: 2018-01-03 | End: 2018-01-03 | Stop reason: SURG

## 2018-01-03 RX ORDER — LABETALOL HYDROCHLORIDE 5 MG/ML
5 INJECTION, SOLUTION INTRAVENOUS
Status: DISCONTINUED | OUTPATIENT
Start: 2018-01-03 | End: 2018-01-03 | Stop reason: HOSPADM

## 2018-01-03 RX ORDER — DULOXETIN HYDROCHLORIDE 60 MG/1
60 CAPSULE, DELAYED RELEASE ORAL EVERY MORNING
Status: DISCONTINUED | OUTPATIENT
Start: 2018-01-03 | End: 2018-01-06 | Stop reason: HOSPADM

## 2018-01-03 RX ORDER — LEVOTHYROXINE SODIUM 0.1 MG/1
200 TABLET ORAL EVERY MORNING
Status: DISCONTINUED | OUTPATIENT
Start: 2018-01-03 | End: 2018-01-06 | Stop reason: HOSPADM

## 2018-01-03 RX ORDER — FAMOTIDINE 10 MG/ML
20 INJECTION, SOLUTION INTRAVENOUS ONCE
Status: COMPLETED | OUTPATIENT
Start: 2018-01-03 | End: 2018-01-03

## 2018-01-03 RX ORDER — NALOXONE HCL 0.4 MG/ML
0.2 VIAL (ML) INJECTION AS NEEDED
Status: DISCONTINUED | OUTPATIENT
Start: 2018-01-03 | End: 2018-01-03 | Stop reason: HOSPADM

## 2018-01-03 RX ORDER — TRANEXAMIC ACID 100 MG/ML
INJECTION, SOLUTION INTRAVENOUS AS NEEDED
Status: DISCONTINUED | OUTPATIENT
Start: 2018-01-03 | End: 2018-01-03 | Stop reason: SURG

## 2018-01-03 RX ORDER — LIDOCAINE HYDROCHLORIDE 20 MG/ML
INJECTION, SOLUTION INFILTRATION; PERINEURAL AS NEEDED
Status: DISCONTINUED | OUTPATIENT
Start: 2018-01-03 | End: 2018-01-03 | Stop reason: SURG

## 2018-01-03 RX ORDER — POVIDONE-IODINE 10 MG/G
OINTMENT TOPICAL AS NEEDED
Status: DISCONTINUED | OUTPATIENT
Start: 2018-01-03 | End: 2018-01-03 | Stop reason: HOSPADM

## 2018-01-03 RX ORDER — PROMETHAZINE HYDROCHLORIDE 25 MG/ML
12.5 INJECTION, SOLUTION INTRAMUSCULAR; INTRAVENOUS ONCE AS NEEDED
Status: DISCONTINUED | OUTPATIENT
Start: 2018-01-03 | End: 2018-01-03 | Stop reason: HOSPADM

## 2018-01-03 RX ORDER — HYDROCODONE BITARTRATE AND ACETAMINOPHEN 10; 325 MG/1; MG/1
1 TABLET ORAL EVERY 4 HOURS PRN
Status: DISCONTINUED | OUTPATIENT
Start: 2018-01-03 | End: 2018-01-03

## 2018-01-03 RX ORDER — BISACODYL 5 MG/1
10 TABLET, DELAYED RELEASE ORAL DAILY PRN
Status: DISCONTINUED | OUTPATIENT
Start: 2018-01-03 | End: 2018-01-06 | Stop reason: HOSPADM

## 2018-01-03 RX ORDER — OXYBUTYNIN CHLORIDE 10 MG/1
10 TABLET, EXTENDED RELEASE ORAL DAILY
Status: DISCONTINUED | OUTPATIENT
Start: 2018-01-03 | End: 2018-01-06 | Stop reason: HOSPADM

## 2018-01-03 RX ORDER — FLUMAZENIL 0.1 MG/ML
0.2 INJECTION INTRAVENOUS AS NEEDED
Status: DISCONTINUED | OUTPATIENT
Start: 2018-01-03 | End: 2018-01-03 | Stop reason: HOSPADM

## 2018-01-03 RX ORDER — LABETALOL HYDROCHLORIDE 5 MG/ML
INJECTION, SOLUTION INTRAVENOUS AS NEEDED
Status: DISCONTINUED | OUTPATIENT
Start: 2018-01-03 | End: 2018-01-03 | Stop reason: SURG

## 2018-01-03 RX ORDER — FENTANYL CITRATE 50 UG/ML
50 INJECTION, SOLUTION INTRAMUSCULAR; INTRAVENOUS
Status: DISCONTINUED | OUTPATIENT
Start: 2018-01-03 | End: 2018-01-03 | Stop reason: HOSPADM

## 2018-01-03 RX ORDER — PROPOFOL 10 MG/ML
VIAL (ML) INTRAVENOUS AS NEEDED
Status: DISCONTINUED | OUTPATIENT
Start: 2018-01-03 | End: 2018-01-03 | Stop reason: SURG

## 2018-01-03 RX ORDER — ATENOLOL 25 MG/1
50 TABLET ORAL
Status: DISCONTINUED | OUTPATIENT
Start: 2018-01-03 | End: 2018-01-04

## 2018-01-03 RX ORDER — DULOXETIN HYDROCHLORIDE 30 MG/1
30 CAPSULE, DELAYED RELEASE ORAL EVERY MORNING
Status: DISCONTINUED | OUTPATIENT
Start: 2018-01-03 | End: 2018-01-06 | Stop reason: HOSPADM

## 2018-01-03 RX ORDER — CYCLOBENZAPRINE HCL 10 MG
10 TABLET ORAL NIGHTLY
Status: DISCONTINUED | OUTPATIENT
Start: 2018-01-03 | End: 2018-01-06 | Stop reason: HOSPADM

## 2018-01-03 RX ORDER — OXYCODONE AND ACETAMINOPHEN 10; 325 MG/1; MG/1
2 TABLET ORAL EVERY 4 HOURS PRN
Status: DISCONTINUED | OUTPATIENT
Start: 2018-01-03 | End: 2018-01-06 | Stop reason: HOSPADM

## 2018-01-03 RX ORDER — SENNA AND DOCUSATE SODIUM 50; 8.6 MG/1; MG/1
2 TABLET, FILM COATED ORAL NIGHTLY
Status: DISCONTINUED | OUTPATIENT
Start: 2018-01-03 | End: 2018-01-06 | Stop reason: HOSPADM

## 2018-01-03 RX ORDER — GABAPENTIN 300 MG/1
300 CAPSULE ORAL NIGHTLY
Status: DISCONTINUED | OUTPATIENT
Start: 2018-01-03 | End: 2018-01-06 | Stop reason: HOSPADM

## 2018-01-03 RX ORDER — OXYCODONE AND ACETAMINOPHEN 10; 325 MG/1; MG/1
1 TABLET ORAL EVERY 4 HOURS PRN
Status: DISCONTINUED | OUTPATIENT
Start: 2018-01-03 | End: 2018-01-06 | Stop reason: HOSPADM

## 2018-01-03 RX ORDER — DIPHENHYDRAMINE HYDROCHLORIDE 50 MG/ML
12.5 INJECTION INTRAMUSCULAR; INTRAVENOUS
Status: DISCONTINUED | OUTPATIENT
Start: 2018-01-03 | End: 2018-01-03 | Stop reason: HOSPADM

## 2018-01-03 RX ORDER — LISINOPRIL 20 MG/1
20 TABLET ORAL DAILY
Status: DISCONTINUED | OUTPATIENT
Start: 2018-01-03 | End: 2018-01-04

## 2018-01-03 RX ORDER — LEFLUNOMIDE 20 MG/1
20 TABLET ORAL DAILY
Status: DISCONTINUED | OUTPATIENT
Start: 2018-01-03 | End: 2018-01-06 | Stop reason: HOSPADM

## 2018-01-03 RX ORDER — GLYCOPYRROLATE 0.2 MG/ML
INJECTION INTRAMUSCULAR; INTRAVENOUS AS NEEDED
Status: DISCONTINUED | OUTPATIENT
Start: 2018-01-03 | End: 2018-01-03 | Stop reason: SURG

## 2018-01-03 RX ORDER — LISINOPRIL 20 MG/1
20 TABLET ORAL DAILY
COMMUNITY
End: 2018-01-06 | Stop reason: HOSPADM

## 2018-01-03 RX ORDER — ATENOLOL 50 MG/1
50 TABLET ORAL
COMMUNITY
Start: 2018-01-02

## 2018-01-03 RX ORDER — EPHEDRINE SULFATE 50 MG/ML
5 INJECTION, SOLUTION INTRAVENOUS ONCE AS NEEDED
Status: DISCONTINUED | OUTPATIENT
Start: 2018-01-03 | End: 2018-01-03 | Stop reason: HOSPADM

## 2018-01-03 RX ORDER — CEFAZOLIN SODIUM 2 G/100ML
2 INJECTION, SOLUTION INTRAVENOUS EVERY 8 HOURS
Status: COMPLETED | OUTPATIENT
Start: 2018-01-03 | End: 2018-01-04

## 2018-01-03 RX ORDER — PROMETHAZINE HYDROCHLORIDE 25 MG/1
25 SUPPOSITORY RECTAL ONCE AS NEEDED
Status: DISCONTINUED | OUTPATIENT
Start: 2018-01-03 | End: 2018-01-03 | Stop reason: HOSPADM

## 2018-01-03 RX ORDER — ONDANSETRON 2 MG/ML
4 INJECTION INTRAMUSCULAR; INTRAVENOUS ONCE AS NEEDED
Status: DISCONTINUED | OUTPATIENT
Start: 2018-01-03 | End: 2018-01-03 | Stop reason: HOSPADM

## 2018-01-03 RX ORDER — ROPIVACAINE HYDROCHLORIDE 5 MG/ML
INJECTION, SOLUTION EPIDURAL; INFILTRATION; PERINEURAL AS NEEDED
Status: DISCONTINUED | OUTPATIENT
Start: 2018-01-03 | End: 2018-01-03 | Stop reason: SURG

## 2018-01-03 RX ORDER — PROMETHAZINE HYDROCHLORIDE 25 MG/1
25 TABLET ORAL ONCE AS NEEDED
Status: DISCONTINUED | OUTPATIENT
Start: 2018-01-03 | End: 2018-01-03 | Stop reason: HOSPADM

## 2018-01-03 RX ORDER — HYDROMORPHONE HYDROCHLORIDE 1 MG/ML
0.5 INJECTION, SOLUTION INTRAMUSCULAR; INTRAVENOUS; SUBCUTANEOUS
Status: DISCONTINUED | OUTPATIENT
Start: 2018-01-03 | End: 2018-01-03 | Stop reason: HOSPADM

## 2018-01-03 RX ORDER — WARFARIN SODIUM 5 MG/1
5 TABLET ORAL
Status: DISCONTINUED | OUTPATIENT
Start: 2018-01-03 | End: 2018-01-05

## 2018-01-03 RX ORDER — ONDANSETRON 2 MG/ML
INJECTION INTRAMUSCULAR; INTRAVENOUS AS NEEDED
Status: DISCONTINUED | OUTPATIENT
Start: 2018-01-03 | End: 2018-01-03 | Stop reason: SURG

## 2018-01-03 RX ORDER — ONDANSETRON 4 MG/1
4 TABLET, ORALLY DISINTEGRATING ORAL EVERY 6 HOURS PRN
Status: DISCONTINUED | OUTPATIENT
Start: 2018-01-03 | End: 2018-01-06 | Stop reason: HOSPADM

## 2018-01-03 RX ORDER — ACETAMINOPHEN 500 MG
1000 TABLET ORAL ONCE
Status: COMPLETED | OUTPATIENT
Start: 2018-01-03 | End: 2018-01-03

## 2018-01-03 RX ADMIN — NEOSTIGMINE METHYLSULFATE 3 MG: 1 INJECTION INTRAMUSCULAR; INTRAVENOUS; SUBCUTANEOUS at 11:47

## 2018-01-03 RX ADMIN — FENTANYL CITRATE 50 MCG: 50 INJECTION, SOLUTION INTRAMUSCULAR; INTRAVENOUS at 10:35

## 2018-01-03 RX ADMIN — OXYCODONE HYDROCHLORIDE AND ACETAMINOPHEN 2 TABLET: 10; 325 TABLET ORAL at 19:44

## 2018-01-03 RX ADMIN — HYDROMORPHONE HYDROCHLORIDE 0.5 MG: 1 INJECTION, SOLUTION INTRAMUSCULAR; INTRAVENOUS; SUBCUTANEOUS at 13:25

## 2018-01-03 RX ADMIN — Medication 2 MG: at 09:52

## 2018-01-03 RX ADMIN — LIDOCAINE HYDROCHLORIDE 60 MG: 20 INJECTION, SOLUTION INFILTRATION; PERINEURAL at 10:28

## 2018-01-03 RX ADMIN — TRANEXAMIC ACID 1000 MG: 100 INJECTION, SOLUTION INTRAVENOUS at 11:04

## 2018-01-03 RX ADMIN — WARFARIN SODIUM 5 MG: 5 TABLET ORAL at 19:44

## 2018-01-03 RX ADMIN — FENTANYL CITRATE 50 MCG: 50 INJECTION, SOLUTION INTRAMUSCULAR; INTRAVENOUS at 12:06

## 2018-01-03 RX ADMIN — SODIUM CHLORIDE, POTASSIUM CHLORIDE, SODIUM LACTATE AND CALCIUM CHLORIDE: 600; 310; 30; 20 INJECTION, SOLUTION INTRAVENOUS at 10:17

## 2018-01-03 RX ADMIN — FENTANYL CITRATE 50 MCG: 50 INJECTION, SOLUTION INTRAMUSCULAR; INTRAVENOUS at 13:49

## 2018-01-03 RX ADMIN — FENTANYL CITRATE 100 MCG: 50 INJECTION, SOLUTION INTRAMUSCULAR; INTRAVENOUS at 10:39

## 2018-01-03 RX ADMIN — DOCUSATE SODIUM -SENNOSIDES 2 TABLET: 50; 8.6 TABLET, COATED ORAL at 22:23

## 2018-01-03 RX ADMIN — CEFAZOLIN SODIUM 2 G: 2 INJECTION, SOLUTION INTRAVENOUS at 19:43

## 2018-01-03 RX ADMIN — HYDROCODONE BITARTRATE AND ACETAMINOPHEN 1 TABLET: 10; 325 TABLET ORAL at 14:42

## 2018-01-03 RX ADMIN — ONDANSETRON 4 MG: 2 INJECTION INTRAMUSCULAR; INTRAVENOUS at 11:27

## 2018-01-03 RX ADMIN — FAMOTIDINE 20 MG: 10 INJECTION, SOLUTION INTRAVENOUS at 09:18

## 2018-01-03 RX ADMIN — ACETAMINOPHEN 1000 MG: 500 TABLET ORAL at 09:19

## 2018-01-03 RX ADMIN — LEFLUNOMIDE 20 MG: 20 TABLET, FILM COATED ORAL at 23:49

## 2018-01-03 RX ADMIN — SODIUM CHLORIDE, POTASSIUM CHLORIDE, SODIUM LACTATE AND CALCIUM CHLORIDE 9 ML/HR: 600; 310; 30; 20 INJECTION, SOLUTION INTRAVENOUS at 09:19

## 2018-01-03 RX ADMIN — OXYCODONE HYDROCHLORIDE AND ACETAMINOPHEN 2 TABLET: 10; 325 TABLET ORAL at 23:39

## 2018-01-03 RX ADMIN — LABETALOL HYDROCHLORIDE 10 MG: 5 INJECTION, SOLUTION INTRAVENOUS at 10:48

## 2018-01-03 RX ADMIN — ROCURONIUM BROMIDE 40 MG: 10 INJECTION INTRAVENOUS at 10:28

## 2018-01-03 RX ADMIN — GLYCOPYRROLATE 0.4 MG: 0.2 INJECTION INTRAMUSCULAR; INTRAVENOUS at 11:47

## 2018-01-03 RX ADMIN — GABAPENTIN 300 MG: 300 CAPSULE ORAL at 22:23

## 2018-01-03 RX ADMIN — CYCLOBENZAPRINE HYDROCHLORIDE 10 MG: 10 TABLET, FILM COATED ORAL at 22:23

## 2018-01-03 RX ADMIN — PROPOFOL 200 MG: 10 INJECTION, EMULSION INTRAVENOUS at 10:28

## 2018-01-03 RX ADMIN — SODIUM CHLORIDE, POTASSIUM CHLORIDE, SODIUM LACTATE AND CALCIUM CHLORIDE: 600; 310; 30; 20 INJECTION, SOLUTION INTRAVENOUS at 11:49

## 2018-01-03 RX ADMIN — FENTANYL CITRATE 50 MCG: 50 INJECTION, SOLUTION INTRAMUSCULAR; INTRAVENOUS at 09:52

## 2018-01-03 RX ADMIN — HYDROMORPHONE HYDROCHLORIDE 0.5 MG: 10 INJECTION INTRAMUSCULAR; INTRAVENOUS; SUBCUTANEOUS at 15:52

## 2018-01-03 RX ADMIN — DEXAMETHASONE SODIUM PHOSPHATE 8 MG: 10 INJECTION INTRAMUSCULAR; INTRAVENOUS at 11:27

## 2018-01-03 RX ADMIN — CEFAZOLIN SODIUM 2 G: 2 INJECTION, SOLUTION INTRAVENOUS at 10:21

## 2018-01-03 RX ADMIN — METHYLPREDNISOLONE SODIUM SUCCINATE 40 MG: 125 INJECTION, POWDER, FOR SOLUTION INTRAMUSCULAR; INTRAVENOUS at 09:54

## 2018-01-03 RX ADMIN — Medication 2 MG: at 09:18

## 2018-01-03 RX ADMIN — LABETALOL HYDROCHLORIDE 10 MG: 5 INJECTION, SOLUTION INTRAVENOUS at 10:52

## 2018-01-03 RX ADMIN — ROPIVACAINE HYDROCHLORIDE 30 ML: 5 INJECTION, SOLUTION EPIDURAL; INFILTRATION; PERINEURAL at 09:54

## 2018-01-03 RX ADMIN — FENTANYL CITRATE 100 MCG: 50 INJECTION, SOLUTION INTRAMUSCULAR; INTRAVENOUS at 11:53

## 2018-01-03 NOTE — ANESTHESIA PREPROCEDURE EVALUATION
Anesthesia Evaluation     NPO Solid Status: > 8 hours       Airway   Mallampati: II  no difficulty expected  Dental      Pulmonary - normal exam   (+) a smoker Former,   (-) COPD, asthma, sleep apnea  Cardiovascular - normal exam    (+) hypertension,   (-) CAD      Neuro/Psych  (+) psychiatric history Anxiety and Depression,     GI/Hepatic/Renal/Endo    (+) morbid obesity,     Musculoskeletal     Abdominal    Substance History      OB/GYN          Other                                                Anesthesia Plan    ASA 3     general   (Block placed for postoperative pain control per surgeon request.  )  Anesthetic plan and risks discussed with patient.

## 2018-01-03 NOTE — PLAN OF CARE
Problem: Patient Care Overview (Adult)  Goal: Plan of Care Review   01/03/18 1620   Coping/Psychosocial Response Interventions   Plan Of Care Reviewed With patient;friend   Outcome Evaluation   Outcome Summary/Follow up Plan Patient is a pleasant 57 y.o. female s/p R TKA with expected post op weakness and impaired functional mobility. Assist x 1 required for transfers and ambulated 45' with RW and CGA. Patient is independent with all ADLs at baseline. Ordered BSC today-bariatric requested. Patient will benefit from skilled PT services acutely to address deficits as able and improve level of independence. Anticipate DC home with  PT services to follow up.       Problem: Inpatient Physical Therapy  Goal: Bed Mobility Goal LTG- PT   01/03/18 1620   Bed Mobility PT LTG   Bed Mobility PT LTG, Date Established 01/03/18   Bed Mobility PT LTG, Time to Achieve 1 wk   Bed Mobility PT LTG, Activity Type all bed mobility   Bed Mobility PT LTG, [place] Level independent     Goal: Transfer Training Goal 1 LTG- PT   01/03/18 1620   Transfer Training PT LTG   Transfer Training PT LTG, Date Established 01/03/18   Transfer Training PT LTG, Time to Achieve 1 wk   Transfer Training PT LTG, Activity Type all transfers   Transfer Training PT LTG, [place] Level supervision required   Transfer Training PT LTG, Assist Device walker, rolling     Goal: Gait Training Goal LTG- PT   01/03/18 1620   Gait Training PT LTG   Gait Training Goal PT LTG, Date Established 01/03/18   Gait Training Goal PT LTG, Time to Achieve 1 wk   Gait Training Goal PT LTG, [place] Level supervision required   Gait Training Goal PT LTG, Assist Device walker, rolling   Gait Training Goal PT LTG, Distance to Achieve 150     Goal: Range of Motion Goal LTG- PT   01/03/18 1620   Range of Motion PT LTG   Range of Motion Goal PT LTG, Date Established 01/03/18   Range of Motion Goal PT LTG, Time to Achieve 1 wk   Range fo Motion Goal PT LTG, Joint R knee    Range of Motion Goal PT LTG, AROM Measure 5-90'

## 2018-01-03 NOTE — ANESTHESIA POSTPROCEDURE EVALUATION
"Patient: Natalie Terrazas    Procedure Summary     Date Anesthesia Start Anesthesia Stop Room / Location    01/03/18 1017 1200  [facility] / [facility]       Procedure Diagnosis Surgeon Provider    RT TOTAL KNEE ARTHROPLASTY (Right Knee) No diagnosis on file. MD Thony Manzano MD          Anesthesia Type: general  Last vitals  BP   164/86 (01/03/18 1258)   Temp   36.7 °C (98.1 °F) (01/03/18 1158)   Pulse   78 (01/03/18 1258)   Resp   20 (01/03/18 1258)     SpO2   98 % (01/03/18 1258)     Post Anesthesia Care and Evaluation    Patient location during evaluation: PACU  Patient participation: complete - patient participated  Level of consciousness: awake and alert  Pain management: adequate  Airway patency: patent  Anesthetic complications: No anesthetic complications    Cardiovascular status: acceptable  Respiratory status: acceptable  Hydration status: acceptable    Comments: /86  Pulse 78  Temp 36.7 °C (98.1 °F) (Oral)   Resp 20  Ht 162.6 cm (64\")  Wt 120 kg (264 lb 5 oz)  SpO2 98%  BMI 45.37 kg/m2              "

## 2018-01-03 NOTE — THERAPY EVALUATION
Acute Care - Physical Therapy Initial Evaluation  [facility]     Patient Name: Natalie Terrazas  : 1960  MRN: [id number]  Today's Date: 1/3/2018   Onset of Illness/Injury or Date of Surgery Date: 18  Date of Referral to PT: 18  Referring Physician: Dr. Velasco      Admit Date: 1/3/2018     Visit Dx:    ICD-10-CM ICD-9-CM   1. S/P total knee arthroplasty, right Z96.651 V43.65     Patient Active Problem List   Diagnosis   • Osteoarthritis of knee   • Primary osteoarthritis of right knee     Past Medical History:   Diagnosis Date   • Anxiety and depression    • Arthritis    • Cancer     breast   • Disease of thyroid gland    • History of breast cancer    • Hypertension    • Left knee pain    • Spinal cord cysts    • Urinary incontinence      Past Surgical History:   Procedure Laterality Date   • BREAST LUMPECTOMY Right    • GASTRIC BYPASS     • [facility] TOTAL KNEE ARTHROPLASTY Left 2017    Procedure: LT TOTAL KNEE ARTHROPLASTY;  Surgeon: Rakesh Velasco MD;  Location: [facility] OR;  Service: Orthopedics          PT ASSESSMENT (last 72 hours)      PT Evaluation       18 1500 18 0857    Rehab Evaluation    Document Type evaluation  -MA     Subjective Information agree to therapy;complains of;pain;fatigue   pain address prior to PT  -MA     Patient Effort, Rehab Treatment good  -MA     Symptoms Noted During/After Treatment increased pain;fatigue  -MA     General Information    Patient Profile Review yes  -MA     Onset of Illness/Injury or Date of Surgery Date 18  -MA     Referring Physician Dr. Velasco  -MA     General Observations supine in bed with HOB elevated, O2 donned, SCD, no acute distress noted at rest  -MA     Pertinent History Of Current Problem s/p R TKA  -MA     Precautions/Limitations fall precautions  -MA     Prior Level of Function independent:;all household mobility;using stairs  -MA     Equipment Currently Used at Home  walker, rolling;shower chair  -KR     Plans/Goals Discussed With patient;agreed upon  -MA     Living Environment    Lives With  spouse  -KR    Living Arrangements  house  -KR    Home Accessibility  no concerns  -KR    Transportation Available  car;family or friend will provide  -KR    Clinical Impression    Date of Referral to PT 01/03/18  -MA     PT Diagnosis impaired functional mobility 2' post op  -MA     Patient/Family Goals Statement return home with   -MA     Criteria for Skilled Therapeutic Interventions Met yes;treatment indicated  -MA     Pathology/Pathophysiology Noted (Describe Specifically for Each System) musculoskeletal  -MA     Impairments Found (describe specific impairments) aerobic capacity/endurance;gait, locomotion, and balance  -MA     Rehab Potential good, to achieve stated therapy goals  -MA     Vital Signs    Pretreatment Heart Rate (beats/min) 79  -MA     Posttreatment Heart Rate (beats/min) 91  -MA     Pre SpO2 (%) 94  -MA     O2 Delivery Pre Treatment supplemental O2  -MA     Post SpO2 (%) 92  -MA     O2 Delivery Post Treatment supplemental O2  -MA     Pain Assessment    Pain Assessment 0-10  -MA     Pain Score 7  -MA     Post Pain Score 8  -MA     Pain Type Surgical pain  -MA     Pain Location Knee  -MA     Pain Orientation Right  -MA     Pain Intervention(s) Cold applied;Repositioned;Ambulation/increased activity;Rest  -MA     Response to Interventions tolerated  -MA     Vision Assessment/Intervention    Visual Impairment WFL with corrective lenses  -MA     Cognitive Assessment/Intervention    Current Cognitive/Communication Assessment functional  -MA     Orientation Status oriented x 4  -MA     Follows Commands/Answers Questions 100% of the time;able to follow multi-step instructions  -MA     Personal Safety WNL/WFL;good awareness, safety precautions  -MA     Personal Safety Interventions fall prevention program maintained;gait belt;nonskid shoes/slippers when out of bed  -MA     ROM (Range of Motion)    General ROM  Detail R LE limited 2' pain  -MA     MMT (Manual Muscle Testing)    General MMT Assessment Detail R ZEINAB post op weakness, family voiced recent TKA is still weak  -MA     Mobility Assessment/Training    Extremity Weight-Bearing Status right lower extremity  -MA     Right Lower Extremity Weight-Bearing weight-bearing as tolerated  -MA     Bed Mobility, Assessment/Treatment    Bed Mobility, Assistive Device bed rails;head of bed elevated  -MA     Bed Mobility, Scoot/Bridge, [place] contact guard assist  -MA     Bed Mob, Supine to Sit, [place] contact guard assist  -MA     Bed Mob, Sit to Supine, [place] contact guard assist  -MA     Bed Mobility, Impairments strength decreased;impaired balance;pain  -MA     Bed Mobility, Comment Cues for hand placement on bed rails to improve independence  -MA     Transfer Assessment/Treatment    Transfers, Sit-Stand [place] minimum assist (75% patient effort);moderate assist (50% patient effort)  -MA     Transfers, Stand-Sit [place] minimum assist (75% patient effort);moderate assist (50% patient effort)  -MA     Transfers, Sit-Stand-Sit, Assist Device rolling walker  -MA     Toilet Transfer, [place] minimum assist (75% patient effort);moderate assist (50% patient effort)  -MA     Toilet Transfer, Assistive Device rolling walker   grab bar  -MA     Transfer, Safety Issues balance decreased during turns;step length decreased;weight-shifting ability decreased  -MA     Transfer, Impairments pain;strength decreased;impaired balance  -MA     Gait Assessment/Treatment    Gait, [place] Level contact guard assist  -MA     Gait, Assistive Device rolling walker  -MA     Gait, Distance (Feet) 50  -MA     Gait, Gait Pattern Analysis swing-to gait  -MA     Gait, Gait Deviations right:;antalgic;step length decreased;stride length decreased;solo decreased;forward flexed posture  -MA     Gait, Safety Issues balance decreased during turns;weight-shifting ability  decreased;loses balance backward  -MA     Gait, Impairments pain;strength decreased;impaired balance  -MA     Balance Skills Training    Sitting-Level of Assistance Independent  -MA     Sitting-Balance Support Feet supported  -MA     Standing-Level of Assistance Close supervision  -MA     Static Standing Balance Support assistive device  -MA     Gait Balance-Level of Assistance Close supervision  -MA     Gait Balance Support assistive device  -MA     Therapy Exercises    Exercise Protocols total knee  -MA     Total Knee Exercises right:;10 reps;completed protocol  -MA     Positioning and Restraints    Pre-Treatment Position in bed  -MA     Post Treatment Position bed  -MA     In Bed notified nsg;fowlers;call light within reach;encouraged to call for assist;with family/caregiver;SCD pump applied   ice pack applied over R knee, O2 donned  -MA       User Key  (r) = Recorded By, (t) = Taken By, (c) = Cosigned By    Initials Name Provider Type    BRYN Gonzales, RN Registered Nurse    DION Babb, PT Physical Therapist          Physical Therapy Education     Title: PT OT SLP Therapies (Done)     Topic: Physical Therapy (Done)     Point: Mobility training (Done)    Learning Progress Summary    Learner Readiness Method Response Comment Documented by Status   Patient Acceptance E [facility] 01/03/18 1623 Done    Acceptance E [facility] 01/03/18 1602 Done               Point: Home exercise program (Done)    Learning Progress Summary    Learner Readiness Method Response Comment Documented by Status   Patient Acceptance E [facility] 01/03/18 1623 Done    Acceptance E [facility] 01/03/18 1602 Done               Point: Body mechanics (Done)    Learning Progress Summary    Learner Readiness Method Response Comment Documented by Status   Patient Acceptance E [facility] 01/03/18 1623 Done    Acceptance E [facility] 01/03/18 1602 Done                      User Key     Initials Effective Dates Name Provider Type [facility] 12/13/16 -   Roseline Babb, PT Physical Therapist PT                PT Recommendation and Plan  Anticipated Equipment Needs At Discharge: bedside commode (requested bariatric BSC- 1/3/18)  Anticipated Discharge Disposition: home with assist, home with home health  Planned Therapy Interventions: balance training, bed mobility training, gait training, home exercise program, patient/family education, postural re-education, stair training, strengthening, transfer training  PT Frequency: 2 times/day  Plan of Care Review  Plan Of Care Reviewed With: patient, friend  Outcome Summary/Follow up Plan: Patient is a pleasant 57 y.o. female s/p R TKA with expected post op weakness and impaired functional mobility. Assist x 1 required for transfers and ambulated 45' with RW and CGA. Patient is independent with all ADLs at baseline. Ordered BSC today-bariatric requested. Patient will benefit from skilled PT services acutely to address deficits as able and improve level of independence. Anticipate DC home with  PT services to follow up.          IP PT Goals       01/03/18 1620          Bed Mobility PT LTG    Bed Mobility PT LTG, Date Established 01/03/18  -MA      Bed Mobility PT LTG, Time to Achieve 1 wk  -MA      Bed Mobility PT LTG, Activity Type all bed mobility  -MA      Bed Mobility PT LTG, [place] Level independent  -MA      Transfer Training PT LTG    Transfer Training PT LTG, Date Established 01/03/18  -MA      Transfer Training PT LTG, Time to Achieve 1 wk  -MA      Transfer Training PT LTG, Activity Type all transfers  -MA      Transfer Training PT LTG, [place] Level supervision required  -MA      Transfer Training PT LTG, Assist Device walker, rolling  -MA      Gait Training PT LTG    Gait Training Goal PT LTG, Date Established 01/03/18  -MA      Gait Training Goal PT LTG, Time to Achieve 1 wk  -MA      Gait Training Goal PT LTG, [place] Level supervision required  -MA      Gait Training Goal PT LTG, Assist  Device walker, rolling  -MA      Gait Training Goal PT LTG, Distance to Achieve 150  -MA      Stair Training PT LTG    Stair Training Goal PT LTG, Date Established 01/03/18  -MA      Stair Training Goal PT LTG, Time to Achieve 1 wk  -MA      Stair Training Goal PT LTG, Number of Steps 3  -MA      Stair Training Goal PT LTG, [place] Level contact guard assist  -MA      Stair Training Goal PT LTG, Assist Device --   no HR  -MA      Range of Motion PT LTG    Range of Motion Goal PT LTG, Date Established 01/03/18  -MA      Range of Motion Goal PT LTG, Time to Achieve 1 wk  -MA      Range fo Motion Goal PT LTG, Joint R knee  -MA      Range of Motion Goal PT LTG, AROM Measure 5-90'  -MA        User Key  (r) = Recorded By, (t) = Taken By, (c) = Cosigned By    Initials Name Provider Type    DION Babb PT Physical Therapist                Outcome Measures       01/03/18 1600          How much help from another person do you currently need...    Turning from your back to your side while in flat bed without using bedrails? 4  -MA      Moving from lying on back to sitting on the side of a flat bed without bedrails? 4  -MA      Moving to and from a bed to a chair (including a wheelchair)? 3  -MA      Standing up from a chair using your arms (e.g., wheelchair, bedside chair)? 3  -MA      Climbing 3-5 steps with a railing? 3  -MA      To walk in hospital room? 3  -MA      AM-PAC 6 Clicks Score 20  -MA      Functional Assessment    Outcome Measure Options AM-PAC 6 Clicks Basic Mobility (PT)  -MA        User Key  (r) = Recorded By, (t) = Taken By, (c) = Cosigned By    Initials Name Provider Type    DION Babb PT Physical Therapist           Time Calculation:         PT Charges       01/03/18 1624          Time Calculation    Start Time 1550  -MA      Stop Time 1624  -MA      Time Calculation (min) 34 min  -MA      PT Received On 01/03/18  -MA      PT - Next Appointment 01/04/18  -MA      PT Goal Re-Cert  Due Date 01/10/18  -MA        User Key  (r) = Recorded By, (t) = Taken By, (c) = Cosigned By    Initials Name Provider Type    DION Babb PT Physical Therapist          Therapy Charges for Today     Code Description Service Date Service Provider Modifiers Qty    43314245385 HC PT EVAL MOD COMPLEXITY 2 1/3/2018 Roseline Babb, PT GP 1    53610356122 HC PT THER PROC EA 15 MIN 1/3/2018 Roseline Babb, PT GP 2    75680850131 HC PT THER SUPP EA 15 MIN 1/3/2018 Roseline Babb, PT GP 1          PT G-Codes  Outcome Measure Options: AM-PAC 6 Clicks Basic Mobility (PT)      Roseline Babb PT  1/3/2018

## 2018-01-03 NOTE — ANESTHESIA PROCEDURE NOTES
Airway  Urgency: elective    Airway not difficult    General Information and Staff    Patient location during procedure: OR  Anesthesiologist: COREY FARMER  CRNA: ALYCIA JESUS    Indications and Patient Condition  Indications for airway management: airway protection    Preoxygenated: yes  MILS maintained throughout  Mask difficulty assessment: 2 - vent by mask + OA or adjuvant +/- NMBA    Final Airway Details  Final airway type: endotracheal airway      Successful airway: ETT  Cuffed: yes   Successful intubation technique: direct laryngoscopy  Facilitating devices/methods: intubating stylet  Endotracheal tube insertion site: oral  Blade: [place]  Blade size: #2  ETT size: 7.0 mm  Cormack-Lehane Classification: grade I - full view of glottis  Placement verified by: chest auscultation   Cuff volume (mL): 8  Measured from: lips  ETT to lips (cm): 21  Number of attempts at approach: 1    Additional Comments  Pt preoxygenated, SIVI, bag mask vent, ATETI, dentition as before

## 2018-01-03 NOTE — PLAN OF CARE
Problem: Patient Care Overview (Adult)  Goal: Plan of Care Review  Outcome: Ongoing (interventions implemented as appropriate)   01/03/18 0905   Coping/Psychosocial Response Interventions   Plan Of Care Reviewed With patient   Patient Care Overview   Progress no change     Goal: Adult Individualization and Mutuality  Outcome: Ongoing (interventions implemented as appropriate)   01/03/18 0905   Individualization   Patient Specific Preferences Natalie     Goal: Discharge Needs Assessment  Outcome: Ongoing (interventions implemented as appropriate)      Problem: Perioperative Period (Adult)  Goal: Signs and Symptoms of Listed Potential Problems Will be Absent or Manageable (Perioperative Period)  Outcome: Ongoing (interventions implemented as appropriate)   01/03/18 0905   Perioperative Period   Problems Assessed (Perioperative Period) all   Problems Present (Perioperative Period) none

## 2018-01-03 NOTE — ANESTHESIA PROCEDURE NOTES
Peripheral Block    Patient location during procedure: holding area  Start time: 1/3/2018 9:46 AM  Stop time: 1/3/2018 9:54 AM  Reason for block: at surgeon's request and post-op pain management  Performed by  Anesthesiologist: MICHEAL BRENNAN  Preanesthetic Checklist  Completed: patient identified and risks and benefits discussed  Prep:  Pt Position: supine  Sterile barriers:gloves  Prep: ChloraPrep  Patient monitoring: blood pressure monitoring, continuous pulse oximetry and EKG  Procedure  Sedation:yes    Guidance:ultrasound guided  ULTRASOUND INTERPRETATION.  Using ultrasound guidance a 21 G gauge needle was placed in close proximity to the femoral nerve, at which point, under ultrasound guidance anesthetic was injected in the area of the nerve and spread of the anesthesia was seen on ultrasound in close proximity thereto.  There were no abnormalities seen on ultrasound; a digital image was taken; and the patient tolerated the procedure with no complications. Images:still images obtained    Laterality:right  Block Type:femoral and adductor canal block  Injection Technique:single-shot  Needle Type:short-bevel  Needle Gauge:21 G    Medications  Depomedrol:40 mg  Comment:    Local Injected:ropivacaine 0.5% Local Amount Injected:30mL  Post Assessment  Injection Assessment: negative aspiration for heme, no paresthesia on injection and incremental injection  Patient Tolerance:comfortable throughout block  Complications:no  Additional Notes  Ultrasound Interpretation:  Using ultrasound guidance the needle was placed in close proximity to the femoral nerve and anesthetic was injected in the area of the femoral nerve and spread of the anesthetic was seen on ultrasound in close proximity thereto.  There were no abnormalities seen on ultrasound; a digital image was taken; and the patient tolerated the procedure with no complications.    Block placed for postoperative pain control per surgeon request.

## 2018-01-03 NOTE — H&P
Patient Care Team:  Poonam Temple MD as PCP - General (Family Medicine)    Chief complaint Right Knee Pain    Subjective     History of Present Illness     56 yo female with right knee pain for several years.  Pain has gotten worse within the last several months.  Patient has difficulty with ambulating any distances and going up and down stairs.  She has tried and failed analgesics, NSAIDS, and injections.  Patient has medial and patellofemoral pain.    Review of Systems   Constitutional: Negative for activity change and appetite change.   HENT: Negative for congestion and dental problem.    Eyes: Negative for discharge and itching.   Respiratory: Negative for apnea and chest tightness.    Cardiovascular: Negative for chest pain and leg swelling.   Gastrointestinal: Negative for abdominal distention and abdominal pain.   Endocrine: Negative for cold intolerance and heat intolerance.   Genitourinary: Negative for difficulty urinating and dyspareunia.   Musculoskeletal: Positive for arthralgias, back pain, gait problem, joint swelling and myalgias.   Allergic/Immunologic: Negative for environmental allergies and food allergies.   Neurological: Negative for dizziness and facial asymmetry.   Hematological: Negative for adenopathy. Does not bruise/bleed easily.   Psychiatric/Behavioral: Negative for agitation and behavioral problems.        Past Medical History:   Diagnosis Date   • Anxiety and depression    • Arthritis    • Cancer     breast   • Disease of thyroid gland    • History of breast cancer 2013   • Hypertension    • Left knee pain    • Spinal cord cysts    • Urinary incontinence      Past Surgical History:   Procedure Laterality Date   • BREAST LUMPECTOMY Right    • GASTRIC BYPASS     • WA TOTAL KNEE ARTHROPLASTY Left 7/12/2017    Procedure: LT TOTAL KNEE ARTHROPLASTY;  Surgeon: Rakesh Velasco MD;  Location: [facility];  Service: Orthopedics     Family History   Problem Relation Age of Onset   •  Malig Hyperthermia Neg Hx      Social History   Substance Use Topics   • Smoking status: Former Smoker     Types: Electronic Cigarette   • Smokeless tobacco: Never Used   • Alcohol use No      Comment: alcoholic  has been sober 3 1/2 yrs     Prescriptions Prior to Admission   Medication Sig Dispense Refill Last Dose   • atenolol (TENORMIN) 50 MG tablet Take 50 mg by mouth.   1/3/2018 at 0700   • DULoxetine (CYMBALTA) 30 MG capsule Take 30 mg by mouth Every Morning.   1/2/2018 at 1100   • DULoxetine (CYMBALTA) 60 MG capsule Take 60 mg by mouth Every Morning.   1/2/2018 at 1100   • gabapentin (NEURONTIN) 300 MG capsule Take 300 mg by mouth Every Night.   1/2/2018 at 2359   • leflunomide (ARAVA) 20 MG tablet Take 20 mg by mouth.   12/31/2017   • levothyroxine (SYNTHROID, LEVOTHROID) 200 MCG tablet Take 200 mcg by mouth Every Morning.   1/3/2018 at 0700   • lisinopril (PRINIVIL,ZESTRIL) 20 MG tablet Take 20 mg by mouth Daily.   1/3/2018 at 0700   • Mirabegron ER (MYRBETRIQ) 50 MG tablet sustained-release 24 hour 24 hr tablet Take 50 mg by mouth Every Morning.   1/3/2018 at 0700   • Chlorhexidine Gluconate 2 % pads Apply 1 application topically 2 (Two) Times a Day. Pre op   1/3/2018 at 0700   • cyclobenzaprine (FLEXERIL) 10 MG tablet Take 10 mg by mouth Every Night.   1/1/2018   • mupirocin (BACTROBAN) 2 % ointment Apply 1 application topically 3 (Three) Times a Day. Pre op   1/3/2018 at 0700     Allergies:  Naproxen and Nickel    Objective      Vital Signs  Temp:  [98.6 °F (37 °C)] 98.6 °F (37 °C)  Heart Rate:  [93] 93  Resp:  [20] 20  BP: (160)/(84) 160/84    Physical Exam   Constitutional: She is oriented to person, place, and time. She appears well-developed and well-nourished.   HENT:   Head: Normocephalic and atraumatic.   Eyes: EOM are normal. Pupils are equal, round, and reactive to light.   Neck: Normal range of motion. Neck supple.   Cardiovascular: Normal rate and regular rhythm.    Pulmonary/Chest: Effort  normal and breath sounds normal.   Abdominal: Soft. Bowel sounds are normal.   Genitourinary:   Genitourinary Comments: deferred   Musculoskeletal: She exhibits edema and tenderness.   Neurological: She is alert and oriented to person, place, and time.   Skin: Skin is warm and dry.   Psychiatric: She has a normal mood and affect. Her behavior is normal.   Vitals reviewed.      Results Review:   I reviewed the patient's new clinical results.      Assessment/Plan     Active Problems:    * No active hospital problems. *      Assessment & Plan     DX:  End Stage OA Right Knee    Plan: Plan for RTKA with Dr. Velasco for end stage OA.  Plan for [facility] and Coumadin postoperatively x 4 wks.    I discussed the patients findings and my recommendations with patient    TAMI Long  01/03/18  9:04 AM    Time: 30 min

## 2018-01-03 NOTE — CONSULTS
Referring Provider: Rakesh Velasco MD  Reason for Consultation: To evaluate chronic medical conditions that may be exacerbated postoperatively  PCP: Poonam Temple MD      HPI  Patient is a 57 y.o. female presents with history of chronic pain, morbid obesity who resents for a right total knee arthroplasty.  Patient is complaining of pain she sees  her pain management.  She states that she is on hydrocodone 7.5 and that she had her other knee replaced last time and needed Percocet tens.  She had when necessary Dilaudid and it helped minimal.  Eating large bag of peanut butter M&M's      PAST MEDICAL HISTORY  Past Medical History:   Diagnosis Date   • Anxiety and depression    • Arthritis    • Cancer     breast   • Disease of thyroid gland    • History of breast cancer 2013   • Hypertension    • Left knee pain    • Spinal cord cysts    • Urinary incontinence        PAST SURGICAL HISTORY  Past Surgical History:   Procedure Laterality Date   • BREAST LUMPECTOMY Right    • GASTRIC BYPASS     • [place] TOTAL KNEE ARTHROPLASTY Left 7/12/2017    Procedure: LT TOTAL KNEE ARTHROPLASTY;  Surgeon: Rakesh Velasco MD;  Location: [facility];  Service: Orthopedics       FAMILY HISTORY  Family History   Problem Relation Age of Onset   • Malig Hyperthermia Neg Hx        SOCIAL HISTORY  Social History   Substance Use Topics   • Smoking status: Former Smoker     Types: Electronic Cigarette   • Smokeless tobacco: Never Used   • Alcohol use No      Comment: 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   1/3/2018 at 0700   • DULoxetine (CYMBALTA) 30 MG capsule Take 30 mg by mouth Every Morning.   1/2/2018 at 1100   • DULoxetine (CYMBALTA) 60 MG capsule Take 60 mg by mouth Every Morning.   1/2/2018 at 1100   • gabapentin (NEURONTIN) 300 MG capsule Take 300 mg by mouth Every Night.    "1/2/2018 at 2359   • leflunomide (ARAVA) 20 MG tablet Take 20 mg by mouth.   12/31/2017   • levothyroxine (SYNTHROID, LEVOTHROID) 200 MCG tablet Take 200 mcg by mouth Every Morning.   1/3/2018 at 0700   • lisinopril (PRINIVIL,ZESTRIL) 20 MG tablet Take 20 mg by mouth Daily.   1/3/2018 at 0700   • Mirabegron ER (MYRBETRIQ) 50 MG tablet sustained-release 24 hour 24 hr tablet Take 50 mg by mouth Every Morning.   1/3/2018 at 0700   • Chlorhexidine Gluconate 2 % pads Apply 1 application topically 2 (Two) Times a Day. Pre op   1/3/2018 at 0700   • cyclobenzaprine (FLEXERIL) 10 MG tablet Take 10 mg by mouth Every Night.   1/1/2018   • mupirocin (BACTROBAN) 2 % ointment Apply 1 application topically 3 (Three) Times a Day. Pre op   1/3/2018 at 0700       Allergies:  Naproxen and Nickel    Review of Systems:  fatigue , obesity, chronic arthritis pain  Negative for following (except as per HPI):  Constitution: chills, fevers,   Eyes: change of vision, loss of vision and discharge  ENT: ear drainage, ear ringing and facial trauma  Respiratory: cough, pleuritic pain, shortness of air  Cardiovascular: chest pressure, pain, lower extremity edema, palpitations  Gastrointestinal: constipation, diarrhea, nausea, vomiting, pain    Integument: rash and wound  Hematologic / Lymphatic: excessive bleeding and easy bruising  Musculoskeletal:  joint stiffness, joint swelling and muscle pain  Neurological: headaches, numbness, seizures and tremors  Behavioral / Psych: anxiety, depression and hallucinations         Vital Signs  Temp:  [97.4 °F (36.3 °C)-98.6 °F (37 °C)] 97.4 °F (36.3 °C)  Heart Rate:  [76-93] 80  Resp:  [16-20] 18  BP: (132-167)/(66-98) 159/85  Flowsheet Rows         First Filed Value    Admission Height  162.6 cm (64\") Documented at 01/03/2018 0852    Admission Weight  120 kg (264 lb 5 oz) Documented at 01/03/2018 0852           Physical Exam:  General Appearance:    Alert, cooperative, in no acute distress; obese   Head:    " Normocephalic, without obvious abnormality, atraumatic   Eyes:         conjunctivae and sclerae normal, no icterus, PERRLA   ENT:    Ears grossly intact, oral mucosa moist,   Neck:   No adenopathy, supple, trachea midline,    Back:     Normal to inspection, range of motion normal   Lungs:     Clear to auscultation,respirations regular, even and                   unlabored    Heart:    Regular rhythm and normal rate,  no murmur, normal S1 and S2,   Abdomen:     Normal bowel sounds, no masses,  soft non-tender, non-distended, morbidly obese   Extremities:   Moves all extremities well, no cyanosis, right knee ace wrapped with cold pack applied            Pulses:   Pulses palpable and equal bilaterally   Skin:   No bleeding, rash, bruising    Neurologic:    Psych:   Cranial nerves 2 - 12 grossly intact, sensation intact,     Moves all extremities well, equal bilateral strength    Alert and Oriented x 3, Normal Affect     LABS:  No visits with results within 1 Day(s) from this visit.  Latest known visit with results is:    Appointment on 12/22/2017   Component Date Value Ref Range Status   • Protime 12/22/2017 13.8  11.7 - 14.2 Seconds Final   • INR 12/22/2017 1.10  0.90 - 1.10 Final   • WBC 12/22/2017 4.98  4.50 - 10.70 10*3/mm3 Final   • RBC 12/22/2017 4.51  3.90 - 5.20 10*6/mm3 Final   • Hemoglobin 12/22/2017 13.1  11.9 - 15.5 g/dL Final   • Hematocrit 12/22/2017 40.2  35.6 - 45.5 % Final   • MCV 12/22/2017 89.1  80.5 - 98.2 fL Final   • MCH 12/22/2017 29.0  26.9 - 32.0 pg Final   • MCHC 12/22/2017 32.6  32.4 - 36.3 g/dL Final   • RDW 12/22/2017 15.1* 11.7 - 13.0 % Final   • RDW-SD 12/22/2017 49.1  37.0 - 54.0 fl Final   • MPV 12/22/2017 10.7  6.0 - 12.0 fL Final   • Platelets 12/22/2017 303  140 - 500 10*3/mm3 Final   • Glucose 12/22/2017 98  65 - 99 mg/dL Final   • BUN 12/22/2017 13  6 - 20 mg/dL Final   • Creatinine 12/22/2017 0.88  0.57 - 1.00 mg/dL Final   • Sodium 12/22/2017 140  136 - 145 mmol/L Final   •  Potassium 12/22/2017 4.2  3.5 - 5.2 mmol/L Final   • Chloride 12/22/2017 102  98 - 107 mmol/L Final   • CO2 12/22/2017 25.7  22.0 - 29.0 mmol/L Final   • Calcium 12/22/2017 9.2  8.6 - 10.5 mg/dL Final   • eGFR Non African Amer 12/22/2017 66  >60 mL/min/1.73 Final   • BUN/Creatinine Ratio 12/22/2017 14.8  7.0 - 25.0 Final   • Anion Gap 12/22/2017 12.3  mmol/L Final   • Color, UA 12/22/2017 Yellow  Yellow, Straw Final   • Appearance, UA 12/22/2017 Clear  Clear Final   • pH, UA 12/22/2017 6.0  5.0 - 8.0 Final   • Specific Gravity, UA 12/22/2017 1.020  1.005 - 1.030 Final   • Glucose, UA 12/22/2017 Negative  Negative Final   • Ketones, UA 12/22/2017 Negative  Negative Final   • Bilirubin, UA 12/22/2017 Negative  Negative Final   • Blood, UA 12/22/2017 Negative  Negative Final   • Protein, UA 12/22/2017 Negative  Negative Final   • Leuk Esterase, UA 12/22/2017 Negative  Negative Final   • Nitrite, UA 12/22/2017 Negative  Negative Final   • Urobilinogen, UA 12/22/2017 0.2 E.U./dL  0.2 - 1.0 E.U./dL Final   • ABO Type 12/22/2017 O   Final   • RH type 12/22/2017 Positive   Final   • Antibody Screen 12/22/2017 Negative   Final       DIAGNOSTICS:  Xr Chest Pa & Lateral    Result Date: 12/22/2017  Narrative: PA AND LATERAL CHEST X-RAY 12/22/2017  CLINICAL HISTORY: Preop for right knee replacement surgery scheduled on 01/03/2018. The patient is a former smoker, history of hypertension and right breast cancer.  This correlated to prior chest x-ray from [facility] 07/07/2017.  FINDINGS: The cardiomediastinal silhouette and pulmonary vasculature are within normal limits. The lungs are clear. Costophrenic angles are sharp.      Impression:  No active disease is seen in the chest with no change when compared to prior chest x-ray 07/07/2017.  This report was finalized on 12/22/2017 11:18 AM by Dr. Ricky Campos MD.           Results Review:   I reviewed the patient's new clinical results.  I personally viewed and  interpreted the patient's EKG/Telemetry data  Old records reviewed Preop hemoglobin 15, creatinine 0.88 gives her GFR 66    ASSESSMENT AND PLAN     + Primary osteoarthritis of right knee s/p Right TKA and post-op pain  -Discussed with nursing staff that patient has chronic pain and the acute pain for the surgery on top of chronic that she would likely need a higher dose of pain medicine and to notify primary of her tolerance due to being in pain management.     + Anxiety and depression- we'll continue medical management     + Hypertension- stable,continue medical management    + Hypothyroid  -sTable continue Synthroid     + Morbid obesity   Body mass index is 45.37 kg/(m^2).  -Nutrition consult    + DVT prophylaxis-deferred to primary    I discussed the patients findings and my recommendations with the patient and/or family.  Please reference all orders placed.    Ary Marino MD  01/03/18  5:14 PM

## 2018-01-03 NOTE — OP NOTE
PREOPERATIVE DIAGNOSIS: Primary localized osteoarthritis right knee[]    POSTOPERATIVE DIAGNOSIS: [Same]    PROCEDURE PERFORMED: [Right total knee]    ANESTHESIA:  Gen. with adductor canal block[]    SURGEON:  Rakesh Velasco MD    ASSISTANT SURGEON: Radha Terrazas PA-C and Endy Jaquez CFA[]    BLOOD LOSS: []  100 cc  SPECIMEN: [None]    [This a 57-year-old lady with severe arthritis in her right knee.  She's had pain for years it's getting progressively worse.  She's tried injections and anti-inflammatories with relief of her discomfort.  X-rays show she is bone-on-bone with subchondral sclerosis.  Brought to the hospital today for right total knee.    Patient's brought the holding room given appropriate IV antibiotics will be discontinued after 23 hours.  She's and given a general anesthetic after her abductor canal block performed and the holding room tourniquet was placed around the right leg and the right leg was prepped and draped.  Tourniquet was inflated to 250 and a straight anterior skin incision was made.  Subcutaneous dissected away and a medial arthrotomy performed.  We then the intramedullary guide and distal cut.  The was sized and was found to be a size 4.  The anterior posterior cutting block was applied rotation checked and then the anterior and posterior cuts were made along with the chamfer cuts.  Proximal tibial cut was made using the external guide.  Remaining meniscal fragments were debrided and the tibia was sized at a 3.  The trial femur was applied and the lug holes were made.  Trial tibia was applied with a 9 mm insert.  We found we needed to go up 1 mm so we went to a 10 mm insert.  This gave appropriate stability in extension and flexion.  Rotation the tibia was marked external alignment guide showed appropriate alignment limpElsa Cobb grasped 2 towel clips and measured 23 mm thick and was cut smooth to 15 mm.  The 3 drill holes were made for the size 32 resurfacing patella.  We then  removed all the trials while the real components were opened.  Posterior capsule and periosteum were injected with ropivacaine mixture.  We then used the drill and punch to prepare the tibia.  Knee was irrigated and dried while cement was mixed.  All 3 components were cemented simultaneously.  Size 4 femur cruciate retaining size 3 patella from the [employer] knee system.  After the cement was hardened it was judged that the 10 insert was the appropriate thickness so this was opened and applied to the tibial tray.  Tourniquet released hemostasis obtained irrigated once the ropivacaine mixture in all injected was closed using 0 Ethibond in the arthrotomy 0 and 2-0 Vicryl in the subcutaneous and staples in the skin.  First assistant Radha Terrazas was present throughout blood loss was 100 cc.  Dressing was applied and the patient was transferred to the recovery room thank you]

## 2018-01-04 LAB
ANION GAP SERPL CALCULATED.3IONS-SCNC: 10.5 MMOL/L
BUN BLD-MCNC: 19 MG/DL (ref 6–20)
BUN/CREAT SERPL: 20.7 (ref 7–25)
CALCIUM SPEC-SCNC: 8.1 MG/DL (ref 8.6–10.5)
CHLORIDE SERPL-SCNC: 103 MMOL/L (ref 98–107)
CO2 SERPL-SCNC: 23.5 MMOL/L (ref 22–29)
CREAT BLD-MCNC: 0.92 MG/DL (ref 0.57–1)
GFR SERPL CREATININE-BSD FRML MDRD: 62 ML/MIN/1.73
GLUCOSE BLD-MCNC: 130 MG/DL (ref 65–99)
HCT VFR BLD AUTO: 30.5 % (ref 35.6–45.5)
HGB BLD-MCNC: 9.6 G/DL (ref 11.9–15.5)
INR PPP: 1.2 (ref 0.9–1.1)
POTASSIUM BLD-SCNC: 4.7 MMOL/L (ref 3.5–5.2)
PROTHROMBIN TIME: 14.8 SECONDS (ref 11.7–14.2)
SODIUM BLD-SCNC: 137 MMOL/L (ref 136–145)

## 2018-01-04 PROCEDURE — 85018 HEMOGLOBIN: CPT | Performed by: ORTHOPAEDIC SURGERY

## 2018-01-04 PROCEDURE — 85610 PROTHROMBIN TIME: CPT | Performed by: ORTHOPAEDIC SURGERY

## 2018-01-04 PROCEDURE — 97150 GROUP THERAPEUTIC PROCEDURES: CPT

## 2018-01-04 PROCEDURE — 80048 BASIC METABOLIC PNL TOTAL CA: CPT | Performed by: ORTHOPAEDIC SURGERY

## 2018-01-04 PROCEDURE — 85014 HEMATOCRIT: CPT | Performed by: ORTHOPAEDIC SURGERY

## 2018-01-04 PROCEDURE — 97110 THERAPEUTIC EXERCISES: CPT

## 2018-01-04 PROCEDURE — 25010000003 CEFAZOLIN IN DEXTROSE 2-4 GM/100ML-% SOLUTION: Performed by: ORTHOPAEDIC SURGERY

## 2018-01-04 RX ORDER — LISINOPRIL 10 MG/1
10 TABLET ORAL DAILY
Status: DISCONTINUED | OUTPATIENT
Start: 2018-01-05 | End: 2018-01-04

## 2018-01-04 RX ORDER — ATENOLOL 25 MG/1
25 TABLET ORAL EVERY 12 HOURS SCHEDULED
Status: DISCONTINUED | OUTPATIENT
Start: 2018-01-04 | End: 2018-01-06 | Stop reason: HOSPADM

## 2018-01-04 RX ORDER — WARFARIN SODIUM 7.5 MG/1
7.5 TABLET ORAL
Status: COMPLETED | OUTPATIENT
Start: 2018-01-04 | End: 2018-01-04

## 2018-01-04 RX ADMIN — LEFLUNOMIDE 20 MG: 20 TABLET, FILM COATED ORAL at 08:38

## 2018-01-04 RX ADMIN — LEVOTHYROXINE SODIUM 200 MCG: 100 TABLET ORAL at 06:55

## 2018-01-04 RX ADMIN — OXYCODONE HYDROCHLORIDE AND ACETAMINOPHEN 2 TABLET: 10; 325 TABLET ORAL at 17:17

## 2018-01-04 RX ADMIN — LISINOPRIL 20 MG: 20 TABLET ORAL at 08:38

## 2018-01-04 RX ADMIN — GABAPENTIN 300 MG: 300 CAPSULE ORAL at 21:10

## 2018-01-04 RX ADMIN — WARFARIN SODIUM 7.5 MG: 7.5 TABLET ORAL at 17:17

## 2018-01-04 RX ADMIN — ATENOLOL 25 MG: 25 TABLET ORAL at 21:10

## 2018-01-04 RX ADMIN — DULOXETINE HYDROCHLORIDE 30 MG: 30 CAPSULE, DELAYED RELEASE ORAL at 08:39

## 2018-01-04 RX ADMIN — BISACODYL 10 MG: 5 TABLET, COATED ORAL at 08:53

## 2018-01-04 RX ADMIN — CYCLOBENZAPRINE HYDROCHLORIDE 10 MG: 10 TABLET, FILM COATED ORAL at 21:10

## 2018-01-04 RX ADMIN — OXYBUTYNIN CHLORIDE 10 MG: 10 TABLET, FILM COATED, EXTENDED RELEASE ORAL at 08:39

## 2018-01-04 RX ADMIN — ATENOLOL 50 MG: 25 TABLET ORAL at 08:38

## 2018-01-04 RX ADMIN — OXYCODONE HYDROCHLORIDE AND ACETAMINOPHEN 2 TABLET: 10; 325 TABLET ORAL at 08:38

## 2018-01-04 RX ADMIN — OXYCODONE HYDROCHLORIDE AND ACETAMINOPHEN 2 TABLET: 10; 325 TABLET ORAL at 12:36

## 2018-01-04 RX ADMIN — DULOXETINE HYDROCHLORIDE 60 MG: 60 CAPSULE, DELAYED RELEASE ORAL at 08:38

## 2018-01-04 RX ADMIN — OXYCODONE HYDROCHLORIDE AND ACETAMINOPHEN 2 TABLET: 10; 325 TABLET ORAL at 04:12

## 2018-01-04 RX ADMIN — CEFAZOLIN SODIUM 2 G: 2 INJECTION, SOLUTION INTRAVENOUS at 04:12

## 2018-01-04 RX ADMIN — OXYCODONE HYDROCHLORIDE AND ACETAMINOPHEN 2 TABLET: 10; 325 TABLET ORAL at 21:10

## 2018-01-04 NOTE — DISCHARGE PLACEMENT REQUEST
"Natalie Terrazas (57 y.o. Female)     Date of Birth Social Security Number Address Home Phone MRN    1960  [id number] [id number] [phone] [id number]    [employer] Mar[employer] Status          Hindu        Admission Date Admission Type Admitting Provider Attending Provider Department, Room/Bed    1/3/18 Elective Rakesh Velasco MD Pomeroy, Donald L, MD [address], [address]    Discharge Date Discharge Disposition Discharge Destination                      Attending Provider: Rakesh Velasco MD     Allergies:  Naproxen, Nickel    Isolation:  None   Infection:  None   Code Status:  FULL    Ht:  162.6 cm (64\")   Wt:  120 kg (264 lb 5 oz)    Admission Cmt:  None   Principal Problem:  None                Active Insurance as of 1/3/2018     Primary Coverage     Payor Plan Insurance Group Employer/Plan Group    MEDICARE MEDICARE A & B      Payor Plan Address Payor Plan Phone Number Effective From Effective To    [address] [phone] 4/1/2016     [place] [address]       Subscriber Name Subscriber Birth Date Member ID       NATALIE TERRAZAS 1960 [id number]           Secondary Coverage     Payor Plan Insurance Group Employer/Plan Group    [place]      Payor Plan Address Payor Plan Phone Number Effective From Effective To    [address] [phone] 12/15/2017     [place] [address]       Subscriber Name Subscriber Birth Date Member ID       NATALIE TERRAZAS 1960 [id number]                 Emergency Contacts      (Rel.) Home Phone Work Phone Mobile Phone    Louis Wood (Spouse) [phone] -- [phone]    Jammie Swann (Friend) -- -- --        "

## 2018-01-04 NOTE — PLAN OF CARE
Problem: Patient Care Overview (Adult)  Goal: Plan of Care Review  Outcome: Ongoing (interventions implemented as appropriate)   01/04/18 0448   Coping/Psychosocial Response Interventions   Plan Of Care Reviewed With patient;friend   Patient Care Overview   Progress improving   Outcome Evaluation   Outcome Summary/Follow up Plan VSS. Neurovascular assessments WNL. Dressing clean dry intact. Ambulating with assist X1 with mild mobility deficit. Pain being controlled with PO analgesic. Verbalizes understanding of educational topics to include monitoring of b/p and compliance with medications .Will go home with home health upon d/c       Problem: Perioperative Period (Adult)  Goal: Signs and Symptoms of Listed Potential Problems Will be Absent or Manageable (Perioperative Period)  Outcome: Ongoing (interventions implemented as appropriate)   01/04/18 0448   Perioperative Period   Problems Assessed (Perioperative Period) all   Problems Present (Perioperative Period) pain       Problem: Knee Replacement, Total (Adult)  Goal: Signs and Symptoms of Listed Potential Problems Will be Absent or Manageable (Knee Replacement, Total)  Outcome: Ongoing (interventions implemented as appropriate)   01/04/18 0448   Knee Replacement, Total   Problems Assessed (Total Knee Replacement) all   Problems Present (Total Knee Replacement) pain;decreased range of motion;functional decline/self care deficit       Problem: Fall Risk (Adult)  Goal: Absence of Falls  Outcome: Ongoing (interventions implemented as appropriate)   01/04/18 0448   Fall Risk (Adult)   Absence of Falls achieves outcome

## 2018-01-04 NOTE — PROGRESS NOTES
Orthopedic Total Progress Note        Patient: Natalie Terrazas    Date of Admission: 1/3/2018  8:27 AM    YOB: 1960    Medical Record Number: [id number]    Attending Physician: Rakesh Velasco MD      POD # 1     Status post: [place] TOTAL KNEE ARTHROPLASTY [57292] (RT TOTAL KNEE ARTHROPLASTY)      Systemic or Specific Complaints: No Complaints      Allergies   Allergen Reactions   • Naproxen Swelling   • Nickel Other (See Comments)     Skin irritation         Current Medications:  Scheduled Meds:  atenolol 50 mg Oral Q24H   cyclobenzaprine 10 mg Oral Nightly   DULoxetine 30 mg Oral QAM   DULoxetine 60 mg Oral QAM   gabapentin 300 mg Oral Nightly   leflunomide 20 mg Oral Daily   levothyroxine 200 mcg Oral QAM   lisinopril 20 mg Oral Daily   oxybutynin XL 10 mg Oral Daily   sennosides-docusate sodium 2 tablet Oral Nightly   warfarin 5 mg Oral Daily     Continuous Infusions:  lactated ringers 9 mL/hr Last Rate: Stopped (01/04/18 0656)     PRN Meds:.bisacodyl  •  bisacodyl  •  docusate sodium  •  fentanyl  •  HYDROmorphone **AND** naloxone  •  magnesium hydroxide  •  midazolam **OR** midazolam  •  ondansetron **OR** ondansetron ODT **OR** ondansetron  •  oxyCODONE-acetaminophen **OR** oxyCODONE-acetaminophen  •  sodium chloride      Physical Exam: 57 y.o. female  General Appearance:    alert and oriented                Pain Relief: Patient reports some relief       Vitals:    01/03/18 1640 01/03/18 1900 01/03/18 2300 01/04/18 0300   BP: 124/72 148/77 127/71 127/75   BP Location: Right arm Right arm Right arm Right arm   Patient Position: Lying Lying Lying Lying   Pulse: 78 89 90 76   Resp: 16 16 16 16   Temp: 98 °F (36.7 °C) 97.5 °F (36.4 °C) 97.8 °F (36.6 °C) 97.9 °F (36.6 °C)   TempSrc: Oral Oral Oral Oral   SpO2: 98% 94% 95% 95%   Weight:       Height:               Extremities:   Operative extremity neurovascular status intact. ROM intact.    Incision intact w/out signs or  symptoms of infection. No            edema, no cyanosis, no calf tenderness     Pulses:     Pulses palpable and equal bilaterally     Skin:     Skin Warm/Dry w/out ulceration, ecchymosis, rash, or   cyanosis     Activity: Mobilizing Per P.T.       Diagnostic Tests:   Admission on 01/03/2018   Component Date Value Ref Range Status   • Glucose 01/04/2018 130* 65 - 99 mg/dL Final   • BUN 01/04/2018 19  6 - 20 mg/dL Final   • Creatinine 01/04/2018 0.92  0.57 - 1.00 mg/dL Final   • Sodium 01/04/2018 137  136 - 145 mmol/L Final   • Potassium 01/04/2018 4.7  3.5 - 5.2 mmol/L Final   • Chloride 01/04/2018 103  98 - 107 mmol/L Final   • CO2 01/04/2018 23.5  22.0 - 29.0 mmol/L Final   • Calcium 01/04/2018 8.1* 8.6 - 10.5 mg/dL Final   • eGFR Non African Amer 01/04/2018 62  >60 mL/min/1.73 Final   • BUN/Creatinine Ratio 01/04/2018 20.7  7.0 - 25.0 Final   • Anion Gap 01/04/2018 10.5  mmol/L Final   • Hemoglobin 01/04/2018 9.6* 11.9 - 15.5 g/dL Final   • Hematocrit 01/04/2018 30.5* 35.6 - 45.5 % Final   • Protime 01/04/2018 14.8* 11.7 - 14.2 Seconds Final   • INR 01/04/2018 1.20* 0.90 - 1.10 Final       Xr Chest Pa & Lateral    Result Date: 12/22/2017  Narrative: PA AND LATERAL CHEST X-RAY 12/22/2017  CLINICAL HISTORY: Preop for right knee replacement surgery scheduled on 01/03/2018. The patient is a former smoker, history of hypertension and right breast cancer.  This correlated to prior chest x-ray [facility]07[facility].  FINDINGS: The cardiomediastinal silhouette and pulmonary vasculature are within normal limits. The lungs are clear. Costophrenic angles are sharp.      Impression:  No active disease is seen in the chest with no change when compared to prior chest x-ray 07/07/2017.  This report was finalized on 12/22/2017 11:18 AM by Dr. Ricky Campos MD.          Assessment:  Patient Active Problem List   Diagnosis   • Osteoarthritis of knee   • Primary osteoarthritis of right knee   • Anxiety and depression   • Hypertension   • Disease of  thyroid gland   • Morbid obesity with BMI of 45.0-49.9, adult     Post-operative Pain  Immobility    Plan:    Continue Physical Therapy, increase mobility as tolerated.  Continue SCDs, Continue DVT prophalaxis.  Continue Pain management efforts  Continue Incisional care      Discharge Plan: saturday to home and home health    Date: 1/4/2018   Time: 7:19 AM    Rakesh Velasco MD

## 2018-01-04 NOTE — PROGRESS NOTES
"     LOS: 1 day   Primary Care Physician: Poonam Temple MD     Subjective   Feels okay.  Very tired.  Mild lightheadedness earlier.  History of restless leg syndrome right leg.  Has not been on Requip or mirtazapine.  Mild sleep apnea 1 year ago.  Has lost 40 pounds in the interim    Vital Signs  Body mass index is 45.37 kg/(m^2).  Temp:  [97.5 °F (36.4 °C)-98.7 °F (37.1 °C)] 98.1 °F (36.7 °C)  Heart Rate:  [73-90] 73  Resp:  [16] 16  BP: (108-148)/(62-77) 122/70      Objective:  General Appearance:  In no acute distress (No distress.  Morbidly obese.  ).    Vital signs: (most recent): Blood pressure 122/70, pulse 73, temperature 98.1 °F (36.7 °C), temperature source Oral, resp. rate 16, height 162.6 cm (64\"), weight 120 kg (264 lb 5 oz), SpO2 93 %.    Lungs:  There are decreased breath sounds.  No wheezes, rales or rhonchi.    Heart: Normal rate.  Regular rhythm.  No murmur.   Abdomen: Abdomen is soft and non-distended.  (Obese)There is no abdominal tenderness.   There is no splenomegaly. There is no hepatomegaly.   Extremities: There is dependent edema.  (Trace, right greater than left)  Neurological: Patient is alert.          Results Review:    I reviewed the patient's new clinical results.      Results from last 7 days  Lab Units 01/04/18  0400   HEMOGLOBIN g/dL 9.6*       Results from last 7 days  Lab Units 01/04/18  0400   SODIUM mmol/L 137   POTASSIUM mmol/L 4.7   CHLORIDE mmol/L 103   CO2 mmol/L 23.5   BUN mg/dL 19   CREATININE mg/dL 0.92   CALCIUM mg/dL 8.1*   GLUCOSE mg/dL 130*       Results from last 7 days  Lab Units 01/04/18  0400   INR  1.20*     Hemoglobin A1C:No results found for: HGBA1C    Glucose Range:No results found for: POCGLU    No results found for: BXETRBRA23    No results found for: TSH    Assessment & Plan      Medication Review: Yes    Active Hospital Problems (** Indicates Principal Problem)    Diagnosis Date Noted   • Primary osteoarthritis of right knee [M17.11] 01/03/2018   • Anxiety " and depression [F41.8] 01/03/2018   • Hypertension [I10] 01/03/2018   • Disease of thyroid gland [E07.9] 01/03/2018   • Morbid obesity with BMI of 45.0-49.9, adult [E66.01, Z68.42] 01/03/2018      Resolved Hospital Problems    Diagnosis Date Noted Date Resolved   No resolved problems to display.       Assessment/Plan  1.  Expected acute blood loss anemia with hypotension and mild dizziness.  Recheck labs in a.m.  Stop lisinopril.  Atenolol decreased with parameters for which to hold.  2.  Restless leg syndrome.  Consider low-dose Requip or similar.  See how she does tonight  3.  Mild sleep apnea 1 year ago.  Discussed with patient at length.  Likely improved secondary to weight loss as noted above.  4.  Chronic pain.  Medications noted.  Was put on Neurontin by PCP for restless leg syndrome  5.  Postop day 1 right knee replacement    America Gates MD  01/04/18  3:47 PM

## 2018-01-04 NOTE — PLAN OF CARE
Problem: Patient Care Overview (Adult)  Goal: Plan of Care Review  Outcome: Ongoing (interventions implemented as appropriate)   01/04/18 1035   Coping/Psychosocial Response Interventions   Plan Of Care Reviewed With patient   Patient Care Overview   Progress progress toward functional goals as expected   Outcome Evaluation   Outcome Summary/Follow up Plan Pt increasing with ROM and transfer safety and I to RWX from seated position

## 2018-01-04 NOTE — PLAN OF CARE
Problem: Patient Care Overview (Adult)  Goal: Plan of Care Review  Outcome: Ongoing (interventions implemented as appropriate)   01/03/18 4380   Coping/Psychosocial Response Interventions   Plan Of Care Reviewed With patient   Patient Care Overview   Progress progress toward functional goals as expected   Outcome Evaluation   Outcome Summary/Follow up Plan PATIENT IS POST OP TODAY. PAIN WELL CONTROLLED WITH INCREASE IN PAIN MED. AMBULATES WELL WITH ASSIST OF WALKER AND ONE STAFF. PT EDUCATED REGARDING HTN AND HOME MED REGIMEN AS WELL AS MONITORING.     Goal: Adult Individualization and Mutuality  Outcome: Ongoing (interventions implemented as appropriate)    Goal: Discharge Needs Assessment  Outcome: Ongoing (interventions implemented as appropriate)      Problem: Perioperative Period (Adult)  Goal: Signs and Symptoms of Listed Potential Problems Will be Absent or Manageable (Perioperative Period)  Outcome: Ongoing (interventions implemented as appropriate)      Problem: Knee Replacement, Total (Adult)  Goal: Signs and Symptoms of Listed Potential Problems Will be Absent or Manageable (Knee Replacement, Total)  Outcome: Ongoing (interventions implemented as appropriate)      Problem: Fall Risk (Adult)  Goal: Identify Related Risk Factors and Signs and Symptoms  Outcome: Outcome(s) achieved Date Met: 01/03/18    Goal: Absence of Falls  Outcome: Ongoing (interventions implemented as appropriate)

## 2018-01-04 NOTE — THERAPY TREATMENT NOTE
Acute Care - Physical Therapy Treatment Note  [facility]     Patient Name: Natalie Terrazas  : 1960  MRN: [id number]  Today's Date: 2018  Onset of Illness/Injury or Date of Surgery Date: 18  Date of Referral to PT: 18  Referring Physician: Dr. Velasco    Admit Date: 1/3/2018    Visit Dx:    ICD-10-CM ICD-9-CM   1. S/P total knee arthroplasty, right Z96.651 V43.65     Patient Active Problem List   Diagnosis   • Osteoarthritis of knee   • Primary osteoarthritis of right knee   • Anxiety and depression   • Hypertension   • Disease of thyroid gland   • Morbid obesity with BMI of 45.0-49.9, adult               Adult Rehabilitation Note       18 1000          Rehab Assessment/Intervention    Discipline physical therapy assistant  -CW      Document Type therapy note (daily note)  -CW      Subjective Information agree to therapy;complains of;pain  -CW      Patient Effort, Rehab Treatment good  -CW      Precautions/Limitations fall precautions  -CW      Recorded by [CW] Louis Mijares PTA      Vital Signs    O2 Delivery Pre Treatment room air  -CW      Recorded by [CW] Louis Mijares PTA      Pain Assessment    Pain Assessment 0-10  -CW      Pain Score 6  -CW      Post Pain Score 6  -CW      Pain Type Surgical pain  -CW      Pain Location Knee  -CW      Pain Orientation Right  -CW      Pain Intervention(s) Repositioned;Ambulation/increased activity  -CW      Response to Interventions whitley  -CW      Recorded by [CW] Louis Mijares PTA      Cognitive Assessment/Intervention    Current Cognitive/Communication Assessment functional  -CW      Orientation Status oriented x 4  -CW      Follows Commands/Answers Questions 100% of the time  -CW      Personal Safety WNL/WFL  -CW      Personal Safety Interventions fall prevention program maintained;gait belt;muscle strengthening facilitated;nonskid shoes/slippers when out of bed  -CW      Recorded by [CW] Louis Mijares PTA      ROM (Range  of Motion)    General ROM Detail 0-80  -CW      Recorded by [CW] Louis Mijares PTA      Bed Mobility, Assessment/Treatment    Bed Mob, Supine to Sit, [place] supervision required  -CW      Bed Mob, Sit to Supine, [place] supervision required  -CW      Recorded by [CW] Louis Mijares PTA      Transfer Assessment/Treatment    Transfers, Sit-Stand [place] supervision required  -CW      Transfers, Stand-Sit [place] supervision required  -CW      Transfers, Sit-Stand-Sit, Assist Device rolling walker  -CW      Recorded by [CW] Louis Mijares PTA      Gait Assessment/Treatment    Gait, [place] Level supervision required  -CW      Gait, Assistive Device rolling walker  -CW      Gait, Distance (Feet) 100  -CW      Gait, Gait Pattern Analysis swing-to gait  -CW      Gait, Gait Deviations right:;antalgic;solo decreased;step length decreased;stride length decreased  -CW      Recorded by [REFUGIO] Louis Mijares PTA      Therapy Exercises    Exercise Protocols total knee  -CW      Total Knee Exercises right:;15 reps;completed protocol  -CW      Recorded by [CW] Louis Mijares PTA      Positioning and Restraints    Pre-Treatment Position sitting in chair/recliner  -CW      Post Treatment Position chair  -CW      In Chair notified nsg;reclined;call light within reach;encouraged to call for assist  -CW      Recorded by [CW] Louis Mijares PTA        User Key  (r) = Recorded By, (t) = Taken By, (c) = Cosigned By    Initials Name Effective Dates     Louis Mijares PTA 12/13/16 -                 IP PT Goals       01/03/18 1620          Bed Mobility PT LTG    Bed Mobility PT LTG, Date Established 01/03/18  -MA      Bed Mobility PT LTG, Time to Achieve 1 wk  -MA      Bed Mobility PT LTG, Activity Type all bed mobility  -MA      Bed Mobility PT LTG, [place] Level independent  -MA      Transfer Training PT LTG    Transfer Training PT LTG, Date Established 01/03/18  -MA       Transfer Training PT LTG, Time to Achieve 1 wk  -MA      Transfer Training PT LTG, Activity Type all transfers  -MA      Transfer Training PT LTG, [place] Level supervision required  -MA      Transfer Training PT LTG, Assist Device walker, rolling  -MA      Gait Training PT LTG    Gait Training Goal PT LTG, Date Established 01/03/18  -MA      Gait Training Goal PT LTG, Time to Achieve 1 wk  -MA      Gait Training Goal PT LTG, [place] Level supervision required  -MA      Gait Training Goal PT LTG, Assist Device walker, rolling  -MA      Gait Training Goal PT LTG, Distance to Achieve 150  -MA      Stair Training PT LTG    Stair Training Goal PT LTG, Date Established 01/03/18  -MA      Stair Training Goal PT LTG, Time to Achieve 1 wk  -MA      Stair Training Goal PT LTG, Number of Steps 3  -MA      Stair Training Goal PT LTG, [place] Level contact guard assist  -MA      Stair Training Goal PT LTG, Assist Device --   no HR  -MA      Range of Motion PT LTG    Range of Motion Goal PT LTG, Date Established 01/03/18  -MA      Range of Motion Goal PT LTG, Time to Achieve 1 wk  -MA      Range fo Motion Goal PT LTG, Joint R knee  -MA      Range of Motion Goal PT LTG, AROM Measure 5-90'  -MA        User Key  (r) = Recorded By, (t) = Taken By, (c) = Cosigned By    Initials Name Provider Type    DION Babb PT Physical Therapist          Physical Therapy Education     Title: PT OT SLP Therapies (Done)     Topic: Physical Therapy (Done)     Point: Mobility training (Done)    Learning Progress Summary    Learner Readiness Method Response Comment Documented by Status   Patient Acceptance MAGI VITAL DU CW 01/04/18 1035 Done    Acceptance E SAMEER  MA 01/03/18 1623 Done    Acceptance E VU  MA 01/03/18 1602 Done               Point: Home exercise program (Done)    Learning Progress Summary    Learner Readiness Method Response Comment Documented by Status   Patient Acceptance MAGI VITAL DU CW 01/04/18 1035 Done     Acceptance E VU  MA 01/03/18 1623 Done    Acceptance E VU  MA 01/03/18 1602 Done               Point: Body mechanics (Done)    Learning Progress Summary    Learner Readiness Method Response Comment Documented by Status   Patient Acceptance E,TB SJ ESTRELLA   01/04/18 1035 Done    Acceptance E VU  MA 01/03/18 1623 Done    Acceptance E VU  MA 01/03/18 1602 Done                      User Key     Initials Effective Dates Name Provider Type Discipline    MA 12/13/16 -  Roseline Babb, PT Physical Therapist PT     12/13/16 -  Louis Mijares, PTA Physical Therapy Assistant PT                    PT Recommendation and Plan  Anticipated Equipment Needs At Discharge: bedside commode (requested bariatric BSC- 1/3/18)  Anticipated Discharge Disposition: home with assist, home with home health  Planned Therapy Interventions: balance training, bed mobility training, gait training, home exercise program, patient/family education, postural re-education, stair training, strengthening, transfer training  PT Frequency: 2 times/day  Plan of Care Review  Plan Of Care Reviewed With: patient  Progress: progress toward functional goals as expected  Outcome Summary/Follow up Plan: Pt increasing with ROM and transfer safety and I to RWX from seated position          Outcome Measures       01/04/18 1000 01/03/18 1600       How much help from another person do you currently need...    Turning from your back to your side while in flat bed without using bedrails? 4  -CW 4  -MA     Moving from lying on back to sitting on the side of a flat bed without bedrails? 4  -CW 4  -MA     Moving to and from a bed to a chair (including a wheelchair)? 3  -CW 3  -MA     Standing up from a chair using your arms (e.g., wheelchair, bedside chair)? 3  -CW 3  -MA     Climbing 3-5 steps with a railing? 3  -CW 3  -MA     To walk in hospital room? 3  -CW 3  -MA     AM-PAC 6 Clicks Score 20  -CW 20  -MA     Functional Assessment    Outcome Measure Options AM-PAC  6 Clicks Basic Mobility (PT)  -CW AM-PAC 6 Clicks Basic Mobility (PT)  -MA       User Key  (r) = Recorded By, (t) = Taken By, (c) = Cosigned By    Initials Name Provider Type    DION Babb, PT Physical Therapist    CW Louis Mijares PTA Physical Therapy Assistant           Time Calculation:         PT Charges       01/04/18 1036          Time Calculation    Start Time 0934  -CW      Stop Time 1015  -CW      Time Calculation (min) 41 min  -CW      PT Received On 01/04/18  -CW      PT - Next Appointment 01/04/18  -CW        User Key  (r) = Recorded By, (t) = Taken By, (c) = Cosigned By    Initials Name Provider Type    REFUGIO Mijares PTA Physical Therapy Assistant          Therapy Charges for Today     Code Description Service Date Service Provider Modifiers Qty    87791887807 HC PT THER PROC GROUP 1/4/2018 Louis Mijares PTA GP 1    85353295565 HC PT THER PROC EA 15 MIN 1/4/2018 Louis Mijares PTA GP 1          PT G-Codes  Outcome Measure Options: AM-PAC 6 Clicks Basic Mobility (PT)    Louis Mijares PTA  1/4/2018

## 2018-01-04 NOTE — THERAPY TREATMENT NOTE
Acute Care - Physical Therapy Treatment Note  [facility]     Patient Name: Natalie Terrazas  : 1960  MRN: [id number]  Today's Date: 2018  Onset of Illness/Injury or Date of Surgery Date: 18  Date of Referral to PT: 18  Referring Physician: Dr. Velasco    Admit Date: 1/3/2018    Visit Dx:    ICD-10-CM ICD-9-CM   1. S/P total knee arthroplasty, right Z96.651 V43.65     Patient Active Problem List   Diagnosis   • Osteoarthritis of knee   • Primary osteoarthritis of right knee   • Anxiety and depression   • Hypertension   • Disease of thyroid gland   • Morbid obesity with BMI of 45.0-49.9, adult               Adult Rehabilitation Note       18 1500 18 1000       Rehab Assessment/Intervention    Discipline physical therapy assistant  -CW physical therapy assistant  -CW     Document Type therapy note (daily note)  -CW therapy note (daily note)  -CW     Subjective Information agree to therapy;complains of;pain  -CW agree to therapy;complains of;pain  -CW     Patient Effort, Rehab Treatment good  -CW good  -CW     Precautions/Limitations fall precautions  -CW fall precautions  -CW     Recorded by [CW] Louis Mijares PTA [CW] Louis Mijares PTA     Vital Signs    O2 Delivery Pre Treatment room air  -CW room air  -CW     Recorded by [CW] Louis Mijares PTA [CW] Louis Mijares PTA     Pain Assessment    Pain Assessment 0-10  -CW 0-10  -CW     Pain Score 6  -CW 6  -CW     Post Pain Score 6  -CW 6  -CW     Pain Type Surgical pain  -CW Surgical pain  -CW     Pain Location Knee  -CW Knee  -CW     Pain Orientation Right  -CW Right  -CW     Pain Intervention(s) Repositioned;Ambulation/increased activity  -CW Repositioned;Ambulation/increased activity  -CW     Response to Interventions whitley  -CW whitley  -CW     Recorded by [CW] Louis Mijares PTA [CW] Louis Mijares PTA     Cognitive Assessment/Intervention    Current Cognitive/Communication Assessment functional  -CW  functional  -CW     Orientation Status oriented x 4  -CW oriented x 4  -CW     Follows Commands/Answers Questions 100% of the time  -% of the time  -CW     Personal Safety WNL/WFL  -CW WNL/WFL  -CW     Personal Safety Interventions fall prevention program maintained;gait belt;muscle strengthening facilitated;nonskid shoes/slippers when out of bed  -CW fall prevention program maintained;gait belt;muscle strengthening facilitated;nonskid shoes/slippers when out of bed  -CW     Recorded by [CW] Louis Mijares PTA [CW] Louis Mijares PTA     ROM (Range of Motion)    General ROM Detail  0-80  -CW     Recorded by  [CW] Louis Mijares PTA     Bed Mobility, Assessment/Treatment    Bed Mob, Supine to Sit, [place] supervision required  -CW supervision required  -CW     Bed Mob, Sit to Supine, [place] supervision required  -CW supervision required  -CW     Recorded by [CW] Louis Mijares PTA [CW] Louis Mijares PTA     Transfer Assessment/Treatment    Transfers, Sit-Stand [place] supervision required  -CW supervision required  -CW     Transfers, Stand-Sit [place] supervision required  -CW supervision required  -CW     Transfers, Sit-Stand-Sit, Assist Device rolling walker  -CW rolling walker  -CW     Recorded by [CW] Louis Mijares PTA [CW] Louis Mijares PTA     Gait Assessment/Treatment    Gait, [place] Level supervision required  -CW supervision required  -CW     Gait, Assistive Device rolling walker  -CW rolling walker  -CW     Gait, Distance (Feet) 100  -  -CW     Gait, Gait Pattern Analysis swing-to gait  -CW swing-to gait  -CW     Gait, Gait Deviations right:;antalgic;solo decreased;step length decreased;stride length decreased  -CW right:;antalgic;solo decreased;step length decreased;stride length decreased  -CW     Recorded by [CW] Louis Mijares PTA [CW] Louis Mijares PTA     Stairs Assessment/Treatment    Number of Stairs  4   -CW     Stairs, Handrail Location  both sides  -CW     Stairs, [place] Level  supervision required  -CW     Stairs, Technique Used  step to step (ascending);step to step (descending)  -CW     Recorded by  [CW] Louis Mijares PTA     Therapy Exercises    Exercise Protocols total knee  -CW total knee  -CW     Total Knee Exercises right:;20 reps;completed protocol  -CW right:;15 reps;completed protocol  -CW     Recorded by [CW] Louis Mijares PTA [CW] Louis Mijares PTA     Positioning and Restraints    Pre-Treatment Position sitting in chair/recliner  -CW sitting in chair/recliner  -CW     Post Treatment Position chair  -CW chair  -CW     In Chair notified nsg;reclined;call light within reach;encouraged to call for assist  -CW notified nsg;reclined;call light within reach;encouraged to call for assist  -CW     Recorded by [CW] Louis Mijares PTA [CW] Louis Mijares PTA       User Key  (r) = Recorded By, (t) = Taken By, (c) = Cosigned By    Initials Name Effective Dates     Louis Mijares PTA 12/13/16 -                 IP PT Goals       01/03/18 1620          Bed Mobility PT LTG    Bed Mobility PT LTG, Date Established 01/03/18  -MA      Bed Mobility PT LTG, Time to Achieve 1 wk  -MA      Bed Mobility PT LTG, Activity Type all bed mobility  -MA      Bed Mobility PT LTG, [place] Level independent  -MA      Transfer Training PT LTG    Transfer Training PT LTG, Date Established 01/03/18  -MA      Transfer Training PT LTG, Time to Achieve 1 wk  -MA      Transfer Training PT LTG, Activity Type all transfers  -MA      Transfer Training PT LTG, [place] Level supervision required  -MA      Transfer Training PT LTG, Assist Device walker, rolling  -MA      Gait Training PT LTG    Gait Training Goal PT LTG, Date Established 01/03/18  -MA      Gait Training Goal PT LTG, Time to Achieve 1 wk  -MA      Gait Training Goal PT LTG, [place] Level supervision required  -MA      Gait  Training Goal PT LTG, Assist Device walker, rolling  -MA      Gait Training Goal PT LTG, Distance to Achieve 150  -MA      Stair Training PT LTG    Stair Training Goal PT LTG, Date Established 01/03/18  -MA      Stair Training Goal PT LTG, Time to Achieve 1 wk  -MA      Stair Training Goal PT LTG, Number of Steps 3  -MA      Stair Training Goal PT LTG, [place] Level contact guard assist  -MA      Stair Training Goal PT LTG, Assist Device --   no HR  -MA      Range of Motion PT LTG    Range of Motion Goal PT LTG, Date Established 01/03/18  -MA      Range of Motion Goal PT LTG, Time to Achieve 1 wk  -MA      Range fo Motion Goal PT LTG, Joint R knee  -MA      Range of Motion Goal PT LTG, AROM Measure 5-90'  -MA        User Key  (r) = Recorded By, (t) = Taken By, (c) = Cosigned By    Initials Name Provider Type    DION Babb, PT Physical Therapist          Physical Therapy Education     Title: PT OT SLP Therapies (Done)     Topic: Physical Therapy (Done)     Point: Mobility training (Done)    Learning Progress Summary    Learner Readiness Method Response Comment Documented by Status   Patient Acceptance E,TB SAMEERSJ   01/04/18 1035 Done    Acceptance E VU  MA 01/03/18 1623 Done    Acceptance E VU  MA 01/03/18 1602 Done               Point: Home exercise program (Done)    Learning Progress Summary    Learner Readiness Method Response Comment Documented by Status   Patient Acceptance E,SJ VERGARA   01/04/18 1035 Done    Acceptance E VU  MA 01/03/18 1623 Done    Acceptance E VU  MA 01/03/18 1602 Done               Point: Body mechanics (Done)    Learning Progress Summary    Learner Readiness Method Response Comment Documented by Status   Patient Acceptance E,TB SAMEERSJ   01/04/18 1035 Done    Acceptance E VU  MA 01/03/18 1623 Done    Acceptance E VU  MA 01/03/18 1602 Done                      User Key     Initials Effective Dates Name Provider Type [facility] 12/13/16 -  Roseline Babb, PT  Physical Therapist PT    CW 12/13/16 -  Louis Mijares PTA Physical Therapy Assistant PT                    PT Recommendation and Plan  Anticipated Equipment Needs At Discharge: bedside commode (requested bariatric BSC- 1/3/18)  Anticipated Discharge Disposition: home with assist, home with home health  Planned Therapy Interventions: balance training, bed mobility training, gait training, home exercise program, patient/family education, postural re-education, stair training, strengthening, transfer training  PT Frequency: 2 times/day  Plan of Care Review  Plan Of Care Reviewed With: patient  Progress: progress toward functional goals as expected  Outcome Summary/Follow up Plan: Pt increasing with ROM and transfer safety and I to RWX from seated position          Outcome Measures       01/04/18 1000 01/03/18 1600       How much help from another person do you currently need...    Turning from your back to your side while in flat bed without using bedrails? 4  -CW 4  -MA     Moving from lying on back to sitting on the side of a flat bed without bedrails? 4  -CW 4  -MA     Moving to and from a bed to a chair (including a wheelchair)? 3  -CW 3  -MA     Standing up from a chair using your arms (e.g., wheelchair, bedside chair)? 3  -CW 3  -MA     Climbing 3-5 steps with a railing? 3  -CW 3  -MA     To walk in hospital room? 3  -CW 3  -MA     AM-PAC 6 Clicks Score 20  -CW 20  -MA     Functional Assessment    Outcome Measure Options AM-PAC 6 Clicks Basic Mobility (PT)  -CW AM-PAC 6 Clicks Basic Mobility (PT)  -MA       User Key  (r) = Recorded By, (t) = Taken By, (c) = Cosigned By    Initials Name Provider Type    DION Babb, PT Physical Therapist    CW Louis Mijares, NICOLAS Physical Therapy Assistant           Time Calculation:         PT Charges       01/04/18 1503 01/04/18 1036       Time Calculation    Start Time 1400  -CW 0934  -CW     Stop Time 1445  -CW 1015  -CW     Time Calculation (min) 45 min   -CW 41 min  -CW     PT Received On 01/04/18  -CW 01/04/18  -CW     PT - Next Appointment 01/05/18  -CW 01/04/18  -CW       User Key  (r) = Recorded By, (t) = Taken By, (c) = Cosigned By    Initials Name Provider Type    CW Louis Mijares PTA Physical Therapy Assistant          Therapy Charges for Today     Code Description Service Date Service Provider Modifiers Qty    73940394598 HC PT THER PROC GROUP 1/4/2018 Louis Mijares PTA GP 1    87873136484 HC PT THER PROC EA 15 MIN 1/4/2018 Louis Mijares PTA GP 1    50746681184 HC PT THER PROC GROUP 1/4/2018 Louis Mijares PTA GP 1    98073935755 HC PT THER PROC EA 15 MIN 1/4/2018 Louis Mijares PTA GP 1          PT G-Codes  Outcome Measure Options: AM-PAC 6 Clicks Basic Mobility (PT)    Louis Mijares PTA  1/4/2018

## 2018-01-04 NOTE — CONSULTS
"Adult Nutrition  Assessment/PES    Patient Name:  Natalie Terrazas  YOB: 1960  MRN: [id number]  Admit Date:  1/3/2018    Assessment Date:  1/4/2018    Education provided per physician consult. Gave education material for reference. Will continue to follow           Reason for Assessment       01/04/18 1329    Reason for Assessment    Reason For Assessment/Visit physician consult;education    Diagnosis   POD #2 R total knee arthroplasty                Anthropometrics       01/04/18 1330    Anthropometrics (Special Considerations)    Height Used for Calculations 1.626 m (5' 4\")    Weight Used for Calculations 120 kg (264 lb)    RD Calculated     RD Calculated %     RD Calculated BMI (kg/m2) 45.3    Body Mass Index (BMI)    BMI Grade greater than 40 - obesity grade III            Labs/Tests/Procedures/Meds       01/04/18 1330    Labs/Tests/Procedures/Meds    Diagnostic Test/Procedure Review reviewed    Labs/Tests Review Reviewed;Glucose    Medication Review Reviewed, pertinent   cymbalta, coumadin    Significant Vitals reviewed            Physical Findings       01/04/18 1331    Physical Findings/Assessment    Additional Documentation Physical Appearance (Group)   B=20    Physical Appearance    Overall Physical Appearance obese;overweight              Nutrition Prescription Ordered       01/04/18 1331    Nutrition Prescription PO    Current PO Diet Regular            Evaluation of Received Nutrient/Fluid Intake       01/04/18 1331    PO Evaluation    Number of Meals 1    % PO Intake 75            Problem/Interventions:        Problem 1       01/04/18 1331    Nutrition Diagnoses Problem 1    Problem 1 Overweight/Obesity    Etiology (related to) MNT for Treatment/Condition    Signs/Symptoms (evidenced by) BMI    BMI Greater than 40                    Intervention Goal       01/04/18 1331    Intervention Goal    General Maintain nutrition    PO Tolerate PO;Maintain intake    Weight Appropriate " weight loss            Nutrition Intervention       01/04/18 1331    Nutrition Intervention    RD/Tech Action Follow Tx progress;Interview for preference;Care plan reviewd;Encourage intake              Education/Evaluation       01/04/18 1332    Education    Education Provided education regarding    Education Topics Gradual weight loss    Monitor/Evaluation    Monitor Per protocol    Education Follow-up Reinforce PRN        Electronically signed by:  Alondra Wolfe RD  01/04/18 1:32 PM

## 2018-01-05 LAB
ANION GAP SERPL CALCULATED.3IONS-SCNC: 9.3 MMOL/L
BUN BLD-MCNC: 16 MG/DL (ref 6–20)
BUN/CREAT SERPL: 22.5 (ref 7–25)
CALCIUM SPEC-SCNC: 8.2 MG/DL (ref 8.6–10.5)
CHLORIDE SERPL-SCNC: 102 MMOL/L (ref 98–107)
CO2 SERPL-SCNC: 24.7 MMOL/L (ref 22–29)
CREAT BLD-MCNC: 0.71 MG/DL (ref 0.57–1)
DEPRECATED RDW RBC AUTO: 49.8 FL (ref 37–54)
ERYTHROCYTE [DISTWIDTH] IN BLOOD BY AUTOMATED COUNT: 14.8 % (ref 11.7–13)
GFR SERPL CREATININE-BSD FRML MDRD: 84 ML/MIN/1.73
GLUCOSE BLD-MCNC: 116 MG/DL (ref 65–99)
HCT VFR BLD AUTO: 31.8 % (ref 35.6–45.5)
HGB BLD-MCNC: 10 G/DL (ref 11.9–15.5)
INR PPP: 1.32 (ref 0.9–1.1)
MCH RBC QN AUTO: 28.8 PG (ref 26.9–32)
MCHC RBC AUTO-ENTMCNC: 31.4 G/DL (ref 32.4–36.3)
MCV RBC AUTO: 91.6 FL (ref 80.5–98.2)
PLATELET # BLD AUTO: 217 10*3/MM3 (ref 140–500)
PMV BLD AUTO: 11.2 FL (ref 6–12)
POTASSIUM BLD-SCNC: 4.3 MMOL/L (ref 3.5–5.2)
PROTHROMBIN TIME: 15.9 SECONDS (ref 11.7–14.2)
RBC # BLD AUTO: 3.47 10*6/MM3 (ref 3.9–5.2)
SODIUM BLD-SCNC: 136 MMOL/L (ref 136–145)
WBC NRBC COR # BLD: 5 10*3/MM3 (ref 4.5–10.7)

## 2018-01-05 PROCEDURE — 97150 GROUP THERAPEUTIC PROCEDURES: CPT

## 2018-01-05 PROCEDURE — 80048 BASIC METABOLIC PNL TOTAL CA: CPT | Performed by: INTERNAL MEDICINE

## 2018-01-05 PROCEDURE — 25010000002 ENOXAPARIN PER 10 MG: Performed by: PHYSICIAN ASSISTANT

## 2018-01-05 PROCEDURE — 85610 PROTHROMBIN TIME: CPT | Performed by: ORTHOPAEDIC SURGERY

## 2018-01-05 PROCEDURE — 97110 THERAPEUTIC EXERCISES: CPT

## 2018-01-05 PROCEDURE — 85027 COMPLETE CBC AUTOMATED: CPT | Performed by: INTERNAL MEDICINE

## 2018-01-05 RX ORDER — WARFARIN SODIUM 7.5 MG/1
7.5 TABLET ORAL
Status: COMPLETED | OUTPATIENT
Start: 2018-01-05 | End: 2018-01-05

## 2018-01-05 RX ADMIN — DULOXETINE HYDROCHLORIDE 60 MG: 60 CAPSULE, DELAYED RELEASE ORAL at 08:32

## 2018-01-05 RX ADMIN — OXYCODONE HYDROCHLORIDE AND ACETAMINOPHEN 2 TABLET: 10; 325 TABLET ORAL at 13:41

## 2018-01-05 RX ADMIN — HYDROMORPHONE HYDROCHLORIDE 0.5 MG: 10 INJECTION INTRAMUSCULAR; INTRAVENOUS; SUBCUTANEOUS at 08:32

## 2018-01-05 RX ADMIN — LEFLUNOMIDE 20 MG: 20 TABLET, FILM COATED ORAL at 08:32

## 2018-01-05 RX ADMIN — OXYCODONE HYDROCHLORIDE AND ACETAMINOPHEN 2 TABLET: 10; 325 TABLET ORAL at 05:17

## 2018-01-05 RX ADMIN — OXYCODONE HYDROCHLORIDE AND ACETAMINOPHEN 2 TABLET: 10; 325 TABLET ORAL at 01:10

## 2018-01-05 RX ADMIN — ENOXAPARIN SODIUM 40 MG: 40 INJECTION SUBCUTANEOUS at 09:29

## 2018-01-05 RX ADMIN — ONDANSETRON HYDROCHLORIDE 4 MG: 4 TABLET, FILM COATED ORAL at 04:13

## 2018-01-05 RX ADMIN — DOCUSATE SODIUM -SENNOSIDES 2 TABLET: 50; 8.6 TABLET, COATED ORAL at 22:12

## 2018-01-05 RX ADMIN — ATENOLOL 25 MG: 25 TABLET ORAL at 22:12

## 2018-01-05 RX ADMIN — CYCLOBENZAPRINE HYDROCHLORIDE 10 MG: 10 TABLET, FILM COATED ORAL at 22:13

## 2018-01-05 RX ADMIN — OXYCODONE HYDROCHLORIDE AND ACETAMINOPHEN 2 TABLET: 10; 325 TABLET ORAL at 09:29

## 2018-01-05 RX ADMIN — WARFARIN SODIUM 7.5 MG: 7.5 TABLET ORAL at 16:58

## 2018-01-05 RX ADMIN — DULOXETINE HYDROCHLORIDE 30 MG: 30 CAPSULE, DELAYED RELEASE ORAL at 08:32

## 2018-01-05 RX ADMIN — ATENOLOL 25 MG: 25 TABLET ORAL at 08:32

## 2018-01-05 RX ADMIN — OXYBUTYNIN CHLORIDE 10 MG: 10 TABLET, FILM COATED, EXTENDED RELEASE ORAL at 08:32

## 2018-01-05 RX ADMIN — OXYCODONE HYDROCHLORIDE AND ACETAMINOPHEN 2 TABLET: 10; 325 TABLET ORAL at 17:53

## 2018-01-05 RX ADMIN — LEVOTHYROXINE SODIUM 200 MCG: 100 TABLET ORAL at 06:34

## 2018-01-05 RX ADMIN — OXYCODONE HYDROCHLORIDE AND ACETAMINOPHEN 2 TABLET: 10; 325 TABLET ORAL at 22:12

## 2018-01-05 RX ADMIN — GABAPENTIN 300 MG: 300 CAPSULE ORAL at 22:13

## 2018-01-05 NOTE — PLAN OF CARE
Problem: Patient Care Overview (Adult)  Goal: Plan of Care Review  Outcome: Ongoing (interventions implemented as appropriate)   01/05/18 0341   Coping/Psychosocial Response Interventions   Plan Of Care Reviewed With patient   Patient Care Overview   Progress improving   Outcome Evaluation   Outcome Summary/Follow up Plan VSS. Neurovascular assessments WNL. PT reporting very good pain control with PO analgesic. Incision without s/sx infection. Drainage has decreased to scant. Ambulating well. Verbalizes understanding of all educational topics to include monitoring of b/p and medication compliance. Plans for d/c home with home health       Problem: Perioperative Period (Adult)  Goal: Signs and Symptoms of Listed Potential Problems Will be Absent or Manageable (Perioperative Period)  Outcome: Outcome(s) achieved Date Met: 01/05/18 01/05/18 0341   Perioperative Period   Problems Assessed (Perioperative Period) all   Problems Present (Perioperative Period) pain       Problem: Knee Replacement, Total (Adult)  Goal: Signs and Symptoms of Listed Potential Problems Will be Absent or Manageable (Knee Replacement, Total)  Outcome: Ongoing (interventions implemented as appropriate)   01/05/18 0341   Knee Replacement, Total   Problems Assessed (Total Knee Replacement) all   Problems Present (Total Knee Replacement) pain;decreased range of motion       Problem: Fall Risk (Adult)  Goal: Absence of Falls  Outcome: Ongoing (interventions implemented as appropriate)   01/05/18 0341   Fall Risk (Adult)   Absence of Falls achieves outcome

## 2018-01-05 NOTE — THERAPY TREATMENT NOTE
Acute Care - Physical Therapy Treatment Note  [facility]     Patient Name: Natalie Terrazas  : 1960  MRN: [id number]  Today's Date: 2018  Onset of Illness/Injury or Date of Surgery Date: 18  Date of Referral to PT: 18  Referring Physician: Dr. Velasco    Admit Date: 1/3/2018    Visit Dx:    ICD-10-CM ICD-9-CM   1. S/P total knee arthroplasty, right Z96.651 V43.65     Patient Active Problem List   Diagnosis   • Osteoarthritis of knee   • Primary osteoarthritis of right knee   • Anxiety and depression   • Hypertension   • Disease of thyroid gland   • Morbid obesity with BMI of 45.0-49.9, adult               Adult Rehabilitation Note       18 0900 18 1500 18 1000    Rehab Assessment/Intervention    Discipline physical therapy assistant  -CW physical therapy assistant  -CW physical therapy assistant  -CW    Document Type therapy note (daily note)  -CW therapy note (daily note)  -CW therapy note (daily note)  -CW    Subjective Information agree to therapy;complains of;pain  -CW agree to therapy;complains of;pain  -CW agree to therapy;complains of;pain  -CW    Patient Effort, Rehab Treatment good  -CW good  -CW good  -CW    Precautions/Limitations fall precautions  -CW fall precautions  -CW fall precautions  -CW    Recorded by [CW] Louis Mijares, PTA [CW] Louis Mijares, PTA [CW] Louis Mijares, PTA    Vital Signs    O2 Delivery Pre Treatment room air  -CW room air  -CW room air  -CW    Recorded by [CW] Louis Mijares, PTA [CW] Louis Mijares, PTA [CW] Louis Mijares, PTA    Pain Assessment    Pain Assessment 0-10  -CW 0-10  -CW 0-10  -CW    Pain Score 9  -CW 6  -CW 6  -CW    Post Pain Score 9  -CW 6  -CW 6  -CW    Pain Type Surgical pain  -CW Surgical pain  -CW Surgical pain  -CW    Pain Location Knee  -CW Knee  -CW Knee  -CW    Pain Orientation Right  -CW Right  -CW Right  -CW    Pain Intervention(s) Repositioned;Ambulation/increased activity  -CW  Repositioned;Ambulation/increased activity  -CW Repositioned;Ambulation/increased activity  -CW    Response to Interventions whitley  -CW whitley  -CW whitley  -CW    Recorded by [CW] Louis Mijares PTA [CW] Louis Mijares PTA [CW] Louis Mijares PTA    Cognitive Assessment/Intervention    Current Cognitive/Communication Assessment functional  -CW functional  -CW functional  -CW    Orientation Status oriented x 4  -CW oriented x 4  -CW oriented x 4  -CW    Follows Commands/Answers Questions 100% of the time  -% of the time  -% of the time  -CW    Personal Safety WNL/WFL  -CW WNL/WFL  -CW WNL/WFL  -CW    Personal Safety Interventions fall prevention program maintained;gait belt;muscle strengthening facilitated;nonskid shoes/slippers when out of bed  -CW fall prevention program maintained;gait belt;muscle strengthening facilitated;nonskid shoes/slippers when out of bed  -CW fall prevention program maintained;gait belt;muscle strengthening facilitated;nonskid shoes/slippers when out of bed  -CW    Recorded by [CW] Louis Mijares PTA [CW] Louis Mijares PTA [CW] Louis Mijares PTA    ROM (Range of Motion)    General ROM Detail 5-85  -CW  0-80  -CW    Recorded by [CW] Louis Mijares PTA  [CW] Louis Mijares PTA    Bed Mobility, Assessment/Treatment    Bed Mob, Supine to Sit, [place] supervision required  -CW supervision required  -CW supervision required  -CW    Bed Mob, Sit to Supine, [place] supervision required  -CW supervision required  -CW supervision required  -CW    Recorded by [CW] Louis Mijares PTA [CW] Louis Mijares PTA [CW] Louis Mijares PTA    Transfer Assessment/Treatment    Transfers, Sit-Stand [place] supervision required  -CW supervision required  -CW supervision required  -CW    Transfers, Stand-Sit [place] supervision required  -CW supervision required  -CW supervision required  -CW    Transfers, Sit-Stand-Sit, Assist Device  rolling walker  -CW rolling walker  -CW rolling walker  -CW    Recorded by [CW] Louis Mijares PTA [CW] Louis Mijares PTA [CW] Louis Mijares PTA    Gait Assessment/Treatment    Gait, [place] Level supervision required  -CW supervision required  -CW supervision required  -CW    Gait, Assistive Device rolling walker  -CW rolling walker  -CW rolling walker  -CW    Gait, Distance (Feet) 100  -  -  -CW    Gait, Gait Pattern Analysis swing-to gait  -CW swing-to gait  -CW swing-to gait  -CW    Gait, Gait Deviations right:;antalgic;solo decreased  -CW right:;antalgic;solo decreased;step length decreased;stride length decreased  -CW right:;antalgic;solo decreased;step length decreased;stride length decreased  -CW    Recorded by [CW] Louis Mijares PTA [CW] Louis Mijares PTA [CW] Louis Mijares PTA    Stairs Assessment/Treatment    Number of Stairs   4  -CW    Stairs, Handrail Location   both sides  -CW    Stairs, [place] Level   supervision required  -CW    Stairs, Technique Used   step to step (ascending);step to step (descending)  -CW    Recorded by   [CW] Louis Mijares PTA    Therapy Exercises    Exercise Protocols total knee  -CW total knee  -CW total knee  -CW    Total Knee Exercises right:;25 reps;completed protocol  -CW right:;20 reps;completed protocol  -CW right:;15 reps;completed protocol  -CW    Recorded by [CW] Louis Mijares PTA [CW] Louis Mijares PTA [CW] Louis Mijares PTA    Positioning and Restraints    Pre-Treatment Position sitting in chair/recliner  -CW sitting in chair/recliner  -CW sitting in chair/recliner  -CW    Post Treatment Position chair  -CW chair  -CW chair  -CW    In Chair notified nsg;reclined;call light within reach;encouraged to call for assist  -CW notified nsg;reclined;call light within reach;encouraged to call for assist  -CW notified nsg;reclined;call light within reach;encouraged to call for assist   -CW    Recorded by [CW] Luois Mijares, PTA [CW] Louis Mijares, PTA [CW] Louis Mijares, PTA      User Key  (r) = Recorded By, (t) = Taken By, (c) = Cosigned By    Initials Name Effective Dates    CW Louis Mijares, PTA 12/13/16 -                 IP PT Goals       01/03/18 1620          Bed Mobility PT LTG    Bed Mobility PT LTG, Date Established 01/03/18  -MA      Bed Mobility PT LTG, Time to Achieve 1 wk  -MA      Bed Mobility PT LTG, Activity Type all bed mobility  -MA      Bed Mobility PT LTG, [place] Level independent  -MA      Transfer Training PT LTG    Transfer Training PT LTG, Date Established 01/03/18  -MA      Transfer Training PT LTG, Time to Achieve 1 wk  -MA      Transfer Training PT LTG, Activity Type all transfers  -MA      Transfer Training PT LTG, [place] Level supervision required  -MA      Transfer Training PT LTG, Assist Device walker, rolling  -MA      Gait Training PT LTG    Gait Training Goal PT LTG, Date Established 01/03/18  -MA      Gait Training Goal PT LTG, Time to Achieve 1 wk  -MA      Gait Training Goal PT LTG, [place] Level supervision required  -MA      Gait Training Goal PT LTG, Assist Device walker, rolling  -MA      Gait Training Goal PT LTG, Distance to Achieve 150  -MA      Stair Training PT LTG    Stair Training Goal PT LTG, Date Established 01/03/18  -MA      Stair Training Goal PT LTG, Time to Achieve 1 wk  -MA      Stair Training Goal PT LTG, Number of Steps 3  -MA      Stair Training Goal PT LTG, [place] Level contact guard assist  -MA      Stair Training Goal PT LTG, Assist Device --   no HR  -MA      Range of Motion PT LTG    Range of Motion Goal PT LTG, Date Established 01/03/18  -MA      Range of Motion Goal PT LTG, Time to Achieve 1 wk  -MA      Range fo Motion Goal PT LTG, Joint R knee  -MA      Range of Motion Goal PT LTG, AROM Measure 5-90'  -MA        User Key  (r) = Recorded By, (t) = Taken By, (c) = Cosigned By    Initials  Name Provider Type    DION Babb PT Physical Therapist          Physical Therapy Education     Title: PT OT SLP Therapies (Done)     Topic: Physical Therapy (Done)     Point: Mobility training (Done)    Learning Progress Summary    Learner Readiness Method Response Comment Documented by Status   Patient Acceptance E,TB SAMEERSJ   01/04/18 1035 Done    Acceptance E VU  MA 01/03/18 1623 Done    Acceptance E VU  MA 01/03/18 1602 Done               Point: Home exercise program (Done)    Learning Progress Summary    Learner Readiness Method Response Comment Documented by Status   Patient Acceptance E,TB SJ ESTRELLA   01/04/18 1035 Done    Acceptance E VU  MA 01/03/18 1623 Done    Acceptance E VU  MA 01/03/18 1602 Done               Point: Body mechanics (Done)    Learning Progress Summary    Learner Readiness Method Response Comment Documented by Status   Patient Acceptance E,TB SJ ESTRELLA   01/04/18 1035 Done    Acceptance E [facility] 01/03/18 1623 Done    Acceptance E [facility] 01/03/18 1602 Done                      User Key     Initials Effective Dates Name Provider Type Discipline    MA 12/13/16 -  Roseline Babb, PT Physical Therapist PT     12/13/16 -  Louis Mijares PTA Physical Therapy Assistant PT                    PT Recommendation and Plan  Anticipated Equipment Needs At Discharge: bedside commode (requested bariatric BSC- 1/3/18)  Anticipated Discharge Disposition: home with assist, home with home health  Planned Therapy Interventions: balance training, bed mobility training, gait training, home exercise program, patient/family education, postural re-education, stair training, strengthening, transfer training  PT Frequency: 2 times/day  Plan of Care Review  Plan Of Care Reviewed With: patient  Progress: progress toward functional goals as expected  Outcome Summary/Follow up Plan: Pt increasing with ROM and transfer safety and I to RWX from seated position          Outcome Measures       01/04/18  1000 01/03/18 1600       How much help from another person do you currently need...    Turning from your back to your side while in flat bed without using bedrails? 4  -CW 4  -MA     Moving from lying on back to sitting on the side of a flat bed without bedrails? 4  -CW 4  -MA     Moving to and from a bed to a chair (including a wheelchair)? 3  -CW 3  -MA     Standing up from a chair using your arms (e.g., wheelchair, bedside chair)? 3  -CW 3  -MA     Climbing 3-5 steps with a railing? 3  -CW 3  -MA     To walk in hospital room? 3  -CW 3  -MA     AM-PAC 6 Clicks Score 20  -CW 20  -MA     Functional Assessment    Outcome Measure Options AM-PAC 6 Clicks Basic Mobility (PT)  -CW AM-PAC 6 Clicks Basic Mobility (PT)  -MA       User Key  (r) = Recorded By, (t) = Taken By, (c) = Cosigned By    Initials Name Provider Type    DION Babb, PT Physical Therapist    CW Louis Mijares PTA Physical Therapy Assistant           Time Calculation:         PT Charges       01/05/18 1029          Time Calculation    Start Time 0934  -CW      Stop Time 1021  -CW      Time Calculation (min) 47 min  -CW        User Key  (r) = Recorded By, (t) = Taken By, (c) = Cosigned By    Initials Name Provider Type    REFUGIO Mijares PTA Physical Therapy Assistant          Therapy Charges for Today     Code Description Service Date Service Provider Modifiers Qty    39416125680 HC PT THER PROC GROUP 1/4/2018 Louis Mijares, PTA GP 1    28654818385 HC PT THER PROC EA 15 MIN 1/4/2018 Louis Mijares PTA GP 1    27064106076 HC PT THER PROC GROUP 1/4/2018 Louis Mijares PTA GP 1    22443449327 HC PT THER PROC EA 15 MIN 1/4/2018 Louis Mijares, PTA GP 1    72067751888 HC PT THER PROC GROUP 1/5/2018 Louis Mijares PTA GP 1    93804997239 HC PT THER PROC EA 15 MIN 1/5/2018 Louis Mijares, PTA GP 1          PT G-Codes  Outcome Measure Options: AM-PAC 6 Clicks Basic Mobility (PT)    Louis Mijares  PTA  1/5/2018

## 2018-01-05 NOTE — THERAPY TREATMENT NOTE
Acute Care - Physical Therapy Treatment Note  [facility]     Patient Name: Natalie Terrazas  : 1960  MRN: [id number]  Today's Date: 2018  Onset of Illness/Injury or Date of Surgery Date: 18  Date of Referral to PT: 18  Referring Physician: Dr. Velasco    Admit Date: 1/3/2018    Visit Dx:    ICD-10-CM ICD-9-CM   1. S/P total knee arthroplasty, right Z96.651 V43.65     Patient Active Problem List   Diagnosis   • Osteoarthritis of knee   • Primary osteoarthritis of right knee   • Anxiety and depression   • Hypertension   • Disease of thyroid gland   • Morbid obesity with BMI of 45.0-49.9, adult               Adult Rehabilitation Note       18 1400 18 0900 18 1500    Rehab Assessment/Intervention    Discipline physical therapy assistant  -CW physical therapy assistant  -CW physical therapy assistant  -CW    Document Type therapy note (daily note)  -CW therapy note (daily note)  -CW therapy note (daily note)  -CW    Subjective Information agree to therapy;complains of;pain  -CW agree to therapy;complains of;pain  -CW agree to therapy;complains of;pain  -CW    Patient Effort, Rehab Treatment good  -CW good  -CW good  -CW    Precautions/Limitations fall precautions  -CW fall precautions  -CW fall precautions  -CW    Recorded by [CW] Louis Mijares, PTA [CW] Louis Mijares, PTA [CW] Louis Mijares, PTA    Vital Signs    O2 Delivery Pre Treatment room air  -CW room air  -CW room air  -CW    Recorded by [CW] Louis Mijares, PTA [CW] Louis Mijares, PTA [CW] Louis Mijares, PTA    Pain Assessment    Pain Assessment 0-10  -CW 0-10  -CW 0-10  -CW    Pain Score 8  -CW 9  -CW 6  -CW    Post Pain Score 8  -CW 9  -CW 6  -CW    Pain Type Surgical pain  -CW Surgical pain  -CW Surgical pain  -CW    Pain Location Knee  -CW Knee  -CW Knee  -CW    Pain Orientation Right  -CW Right  -CW Right  -CW    Pain Intervention(s) Repositioned;Ambulation/increased activity  -CW  Repositioned;Ambulation/increased activity  -CW Repositioned;Ambulation/increased activity  -CW    Response to Interventions whitley  -CW whitley  -CW whitley  -CW    Recorded by [CW] Louis Mijares PTA [CW] Louis Mijares PTA [CW] Louis Mijares PTA    Cognitive Assessment/Intervention    Current Cognitive/Communication Assessment functional  -CW functional  -CW functional  -CW    Orientation Status oriented x 4  -CW oriented x 4  -CW oriented x 4  -CW    Follows Commands/Answers Questions 100% of the time  -% of the time  -% of the time  -CW    Personal Safety WNL/WFL  -CW WNL/WFL  -CW WNL/WFL  -CW    Personal Safety Interventions fall prevention program maintained;gait belt;muscle strengthening facilitated;nonskid shoes/slippers when out of bed  -CW fall prevention program maintained;gait belt;muscle strengthening facilitated;nonskid shoes/slippers when out of bed  -CW fall prevention program maintained;gait belt;muscle strengthening facilitated;nonskid shoes/slippers when out of bed  -CW    Recorded by [CW] Louis Mijares PTA [CW] Louis Mijares PTA [CW] Louis Mijares PTA    ROM (Range of Motion)    General ROM Detail  5-85  -CW     Recorded by  [CW] Louis Mijares PTA     Bed Mobility, Assessment/Treatment    Bed Mob, Supine to Sit, [place] supervision required  -CW supervision required  -CW supervision required  -CW    Bed Mob, Sit to Supine, [place] supervision required  -CW supervision required  -CW supervision required  -CW    Recorded by [CW] Louis Mijares PTA [CW] Louis Mijares PTA [CW] Louis Mijares PTA    Transfer Assessment/Treatment    Transfers, Sit-Stand [place] supervision required  -CW supervision required  -CW supervision required  -CW    Transfers, Stand-Sit [place] supervision required  -CW supervision required  -CW supervision required  -CW    Transfers, Sit-Stand-Sit, Assist Device rolling walker  -CW rolling walker   -CW rolling walker  -CW    Recorded by [CW] Louis Mijares PTA [CW] Louis Mijares PTA [CW] Louis Mijares PTA    Gait Assessment/Treatment    Gait, [place] Level supervision required  -CW supervision required  -CW supervision required  -CW    Gait, Assistive Device rolling walker  -CW rolling walker  -CW rolling walker  -CW    Gait, Distance (Feet) 100  -  -  -CW    Gait, Gait Pattern Analysis swing-to gait  -CW swing-to gait  -CW swing-to gait  -CW    Gait, Gait Deviations right:;antalgic;solo decreased;step length decreased;stride length decreased  -CW right:;antalgic;solo decreased  -CW right:;antalgic;solo decreased;step length decreased;stride length decreased  -CW    Recorded by [CW] Louis Mijares PTA [CW] Louis Mijares PTA [CW] Louis Mijares PTA    Therapy Exercises    Exercise Protocols total knee  -CW total knee  -CW total knee  -CW    Total Knee Exercises right:;30 reps;completed protocol  -CW right:;25 reps;completed protocol  -CW right:;20 reps;completed protocol  -CW    Recorded by [CW] Louis Mijares PTA [CW] Louis Mijares PTA [CW] Louis Mijares PTA    Positioning and Restraints    Pre-Treatment Position sitting in chair/recliner  -CW sitting in chair/recliner  -CW sitting in chair/recliner  -CW    Post Treatment Position chair  -CW chair  -CW chair  -CW    In Chair notified nsg;reclined;call light within reach;encouraged to call for assist;with family/caregiver  -CW notified nsg;reclined;call light within reach;encouraged to call for assist  -CW notified nsg;reclined;call light within reach;encouraged to call for assist  -CW    Recorded by [CW] Louis Mijares PTA [CW] Louis Mijares PTA [CW] Louis Mijares PTA      01/04/18 1000          Rehab Assessment/Intervention    Discipline physical therapy assistant  -CW      Document Type therapy note (daily note)  -CW      Subjective Information agree to  therapy;complains of;pain  -CW      Patient Effort, Rehab Treatment good  -CW      Precautions/Limitations fall precautions  -CW      Recorded by [CW] Louis Mijares PTA      Vital Signs    O2 Delivery Pre Treatment room air  -CW      Recorded by [CW] Louis Mijares PTA      Pain Assessment    Pain Assessment 0-10  -CW      Pain Score 6  -CW      Post Pain Score 6  -CW      Pain Type Surgical pain  -CW      Pain Location Knee  -CW      Pain Orientation Right  -CW      Pain Intervention(s) Repositioned;Ambulation/increased activity  -CW      Response to Interventions whitley  -CW      Recorded by [CW] Louis Mijares PTA      Cognitive Assessment/Intervention    Current Cognitive/Communication Assessment functional  -CW      Orientation Status oriented x 4  -CW      Follows Commands/Answers Questions 100% of the time  -CW      Personal Safety WNL/WFL  -CW      Personal Safety Interventions fall prevention program maintained;gait belt;muscle strengthening facilitated;nonskid shoes/slippers when out of bed  -CW      Recorded by [CW] Louis Mijares PTA      ROM (Range of Motion)    General ROM Detail 0-80  -CW      Recorded by [CW] Louis Mijares PTA      Bed Mobility, Assessment/Treatment    Bed Mob, Supine to Sit, [place] supervision required  -CW      Bed Mob, Sit to Supine, [place] supervision required  -CW      Recorded by [CW] Louis Mijares PTA      Transfer Assessment/Treatment    Transfers, Sit-Stand [place] supervision required  -CW      Transfers, Stand-Sit [place] supervision required  -CW      Transfers, Sit-Stand-Sit, Assist Device rolling walker  -CW      Recorded by [CW] Louis Mijares PTA      Gait Assessment/Treatment    Gait, [place] Level supervision required  -CW      Gait, Assistive Device rolling walker  -CW      Gait, Distance (Feet) 100  -CW      Gait, Gait Pattern Analysis swing-to gait  -CW      Gait, Gait Deviations  right:;antalgic;solo decreased;step length decreased;stride length decreased  -CW      Recorded by [CW] Louis Mijares PTA      Stairs Assessment/Treatment    Number of Stairs 4  -CW      Stairs, Handrail Location both sides  -CW      Stairs, [place] Level supervision required  -CW      Stairs, Technique Used step to step (ascending);step to step (descending)  -CW      Recorded by [REFUGIO] Louis Mijares PTA      Therapy Exercises    Exercise Protocols total knee  -CW      Total Knee Exercises right:;15 reps;completed protocol  -CW      Recorded by [CW] Louis Mijares PTA      Positioning and Restraints    Pre-Treatment Position sitting in chair/recliner  -CW      Post Treatment Position chair  -CW      In Chair notified nsg;reclined;call light within reach;encouraged to call for assist  -CW      Recorded by [REFUGIO] Louis Mijares PTA        User Key  (r) = Recorded By, (t) = Taken By, (c) = Cosigned By    Initials Name Effective Dates    CW Louis Mijares PTA 12/13/16 -                 IP PT Goals       01/03/18 1620          Bed Mobility PT LTG    Bed Mobility PT LTG, Date Established 01/03/18  -MA      Bed Mobility PT LTG, Time to Achieve 1 wk  -MA      Bed Mobility PT LTG, Activity Type all bed mobility  -MA      Bed Mobility PT LTG, [place] Level independent  -MA      Transfer Training PT LTG    Transfer Training PT LTG, Date Established 01/03/18  -MA      Transfer Training PT LTG, Time to Achieve 1 wk  -MA      Transfer Training PT LTG, Activity Type all transfers  -MA      Transfer Training PT LTG, [place] Level supervision required  -MA      Transfer Training PT LTG, Assist Device walker, rolling  -MA      Gait Training PT LTG    Gait Training Goal PT LTG, Date Established 01/03/18  -MA      Gait Training Goal PT LTG, Time to Achieve 1 wk  -MA      Gait Training Goal PT LTG, [place] Level supervision required  -MA      Gait Training Goal PT LTG, Assist Device walker,  rolling  -MA      Gait Training Goal PT LTG, Distance to Achieve 150  -MA      Stair Training PT LTG    Stair Training Goal PT LTG, Date Established 01/03/18  -MA      Stair Training Goal PT LTG, Time to Achieve 1 wk  -MA      Stair Training Goal PT LTG, Number of Steps 3  -MA      Stair Training Goal PT LTG, [place] Level contact guard assist  -MA      Stair Training Goal PT LTG, Assist Device --   no HR  -MA      Range of Motion PT LTG    Range of Motion Goal PT LTG, Date Established 01/03/18  -MA      Range of Motion Goal PT LTG, Time to Achieve 1 wk  -MA      Range fo Motion Goal PT LTG, Joint R knee  -MA      Range of Motion Goal PT LTG, AROM Measure 5-90'  -MA        User Key  (r) = Recorded By, (t) = Taken By, (c) = Cosigned By    Initials Name Provider Type    DION Babb PT Physical Therapist          Physical Therapy Education     Title: PT OT SLP Therapies (Done)     Topic: Physical Therapy (Done)     Point: Mobility training (Done)    Learning Progress Summary    Learner Readiness Method Response Comment Documented by Status   Patient Acceptance E,TB [facility] ESTRELLA   01/04/18 1035 Done    Acceptance E [facility] 01/03/18 1623 Done    Acceptance E [facility] 01/03/18 1602 Done               Point: Home exercise program (Done)    Learning Progress Summary    Learner Readiness Method Response Comment Documented by Status   Patient Acceptance E,SJ VERGARA   01/04/18 1035 Done    Acceptance E VU  MA 01/03/18 1623 Done    Acceptance E [facility] 01/03/18 1602 Done               Point: Body mechanics (Done)    Learning Progress Summary    Learner Readiness Method Response Comment Documented by Status   Patient Acceptance E,TB [facility] ESTRELLA   01/04/18 1035 Done    Acceptance E [facility] 01/03/18 1623 Done    Acceptance E [facility] 01/03/18 1602 Done                      User Key     Initials Effective Dates Name Provider Type Discipline    MA 12/13/16 -  Roseline Babb PT Physical Therapist PT     12/13/16 -  Louis KO  NICOLAS Mijares Physical Therapy Assistant PT                    PT Recommendation and Plan  Anticipated Equipment Needs At Discharge: bedside commode (requested bariatric BSC- 1/3/18)  Anticipated Discharge Disposition: home with assist, home with home health  Planned Therapy Interventions: balance training, bed mobility training, gait training, home exercise program, patient/family education, postural re-education, stair training, strengthening, transfer training  PT Frequency: 2 times/day  Plan of Care Review  Plan Of Care Reviewed With: patient  Progress: progress toward functional goals as expected  Outcome Summary/Follow up Plan: Pt increasing with ROM and transfer safety and I to RWX from seated position          Outcome Measures       01/04/18 1000 01/03/18 1600       How much help from another person do you currently need...    Turning from your back to your side while in flat bed without using bedrails? 4  -CW 4  -MA     Moving from lying on back to sitting on the side of a flat bed without bedrails? 4  -CW 4  -MA     Moving to and from a bed to a chair (including a wheelchair)? 3  -CW 3  -MA     Standing up from a chair using your arms (e.g., wheelchair, bedside chair)? 3  -CW 3  -MA     Climbing 3-5 steps with a railing? 3  -CW 3  -MA     To walk in hospital room? 3  -CW 3  -MA     AM-PAC 6 Clicks Score 20  -CW 20  -MA     Functional Assessment    Outcome Measure Options AM-PAC 6 Clicks Basic Mobility (PT)  -CW AM-PAC 6 Clicks Basic Mobility (PT)  -MA       User Key  (r) = Recorded By, (t) = Taken By, (c) = Cosigned By    Initials Name Provider Type    DION Babb, PT Physical Therapist    REFUGIO Mijares PTA Physical Therapy Assistant           Time Calculation:         PT Charges       01/05/18 1451 01/05/18 1029       Time Calculation    Start Time 1355  -CW 0934  -CW     Stop Time 1430  -CW 1021  -CW     Time Calculation (min) 35 min  -CW 47 min  -CW     PT Received On 01/05/18  -CW       PT - Next Appointment 01/06/18  -CW        User Key  (r) = Recorded By, (t) = Taken By, (c) = Cosigned By    Initials Name Provider Type    REFUGIO Mijares PTA Physical Therapy Assistant          Therapy Charges for Today     Code Description Service Date Service Provider Modifiers Qty    68021324887 HC PT THER PROC GROUP 1/4/2018 Louis Mijares, PTA GP 1    37770760229 HC PT THER PROC EA 15 MIN 1/4/2018 Louis Mijares, PTA GP 1    21722809240 HC PT THER PROC GROUP 1/4/2018 oLuis Mijares, PTA GP 1    34940968773 HC PT THER PROC EA 15 MIN 1/4/2018 Louis Mijares, PTA GP 1    93001711853 HC PT THER PROC GROUP 1/5/2018 Louis Mijares, PTA GP 1    63355073097 HC PT THER PROC EA 15 MIN 1/5/2018 Louis Mijares, PTA GP 1    32303314230 HC PT THER PROC GROUP 1/5/2018 Louis Mijares, PTA GP 1    32431993697 HC PT THER PROC EA 15 MIN 1/5/2018 Louis Mijares, PTA GP 1          PT G-Codes  Outcome Measure Options: AM-PAC 6 Clicks Basic Mobility (PT)    Louis Mijares PTA  1/5/2018

## 2018-01-05 NOTE — PLAN OF CARE
Problem: Patient Care Overview (Adult)  Goal: Plan of Care Review  Outcome: Ongoing (interventions implemented as appropriate)   01/04/18 1912   Coping/Psychosocial Response Interventions   Plan Of Care Reviewed With patient   Patient Care Overview   Progress improving   Outcome Evaluation   Outcome Summary/Follow up Plan GOOD PAIN CONTROL WITH PO, AMBULATING WELL WITH ASSIST OF 1, PLAN TO DC HOME SATURDAY, MODERATE DRAINAGE FROM INCISION, EDUCATED ON BP MEDS AND MONITORING     Goal: Adult Individualization and Mutuality  Outcome: Ongoing (interventions implemented as appropriate)      Problem: Perioperative Period (Adult)  Goal: Signs and Symptoms of Listed Potential Problems Will be Absent or Manageable (Perioperative Period)  Outcome: Ongoing (interventions implemented as appropriate)      Problem: Knee Replacement, Total (Adult)  Goal: Signs and Symptoms of Listed Potential Problems Will be Absent or Manageable (Knee Replacement, Total)  Outcome: Ongoing (interventions implemented as appropriate)      Problem: Fall Risk (Adult)  Goal: Absence of Falls  Outcome: Ongoing (interventions implemented as appropriate)

## 2018-01-05 NOTE — PROGRESS NOTES
Orthopedic Progress Note        Patient: Natalie Terrazas    Date of Admission: 1/3/2018  8:27 AM    YOB: 1960    Medical Record Number: [id number]    Attending Physician: Rakesh Velasco MD      POD # 2  LOS: 2 days     Status post-MN TOTAL KNEE ARTHROPLASTY [74385] (RT TOTAL KNEE ARTHROPLASTY)        Systemic or Specific Complaints: No Complaints      Allergies   Allergen Reactions   • Naproxen Swelling   • Nickel Other (See Comments)     Skin irritation         Current Medications:  Scheduled Meds:  atenolol 25 mg Oral Q12H   cyclobenzaprine 10 mg Oral Nightly   DULoxetine 30 mg Oral QAM   DULoxetine 60 mg Oral QAM   enoxaparin 40 mg Subcutaneous Once   gabapentin 300 mg Oral Nightly   leflunomide 20 mg Oral Daily   levothyroxine 200 mcg Oral QAM   oxybutynin XL 10 mg Oral Daily   sennosides-docusate sodium 2 tablet Oral Nightly   warfarin 5 mg Oral Daily   warfarin 7.5 mg Oral Once     Continuous Infusions:  lactated ringers 9 mL/hr Last Rate: Stopped (01/04/18 0656)     PRN Meds:.bisacodyl  •  bisacodyl  •  docusate sodium  •  fentanyl  •  HYDROmorphone **AND** naloxone  •  magnesium hydroxide  •  midazolam **OR** midazolam  •  ondansetron **OR** ondansetron ODT **OR** ondansetron  •  oxyCODONE-acetaminophen **OR** oxyCODONE-acetaminophen  •  sodium chloride      Physical Exam: 57 y.o. female  General Appearance:    alert and oriented                Pain Relief: Patient reports some relief   Vitals:    01/04/18 2110 01/04/18 2127 01/05/18 0356 01/05/18 0719   BP: 130/62 170/81 143/72 144/73   BP Location:  Right arm Left arm Left arm   Patient Position:  Lying Lying Lying   Pulse: 80 79 80 85   Resp:  16 16 16   Temp:  99.3 °F (37.4 °C) 98.8 °F (37.1 °C) 99.5 °F (37.5 °C)   TempSrc:  Oral Oral Oral   SpO2:  95% 92% 93%   Weight:       Height:              HEENT:    Normocephalic, without obvious abnormality, atraumatic.          PERRLA; EOMI; Neck supple with trachea midline. No JVD.    No  lymphadenopathy     Lungs:     Clear to auscultation,respirations regular, even and                   Unlabored      Heart:    Regular rhythm and normal rate, normal S1 and S2, no            murmur, no gallop, no rub, no click     Abdomen:     Normal bowel sounds, no masses, no organomegaly, soft        non-tender, non-distended, no guarding, no rebound                 tenderness       Extremities:   Operative extremity neurovascular status intact. ROM intact.    Incision intact w/out signs or  symptoms of infection. No           edema, no cyanosis, no calf tenderness     Pulses:     Pulses palpable and equal bilaterally     Skin:     Skin Warm/Dry w/out ulceration, ecchymosis, rash, or   cyanosis     Activity: Mobilizing Per P.T.   Weight Bearing: As Tolerated    Diagnostic Tests:  Admission on 01/03/2018   Component Date Value Ref Range Status   • Glucose 01/04/2018 130* 65 - 99 mg/dL Final   • BUN 01/04/2018 19  6 - 20 mg/dL Final   • Creatinine 01/04/2018 0.92  0.57 - 1.00 mg/dL Final   • Sodium 01/04/2018 137  136 - 145 mmol/L Final   • Potassium 01/04/2018 4.7  3.5 - 5.2 mmol/L Final   • Chloride 01/04/2018 103  98 - 107 mmol/L Final   • CO2 01/04/2018 23.5  22.0 - 29.0 mmol/L Final   • Calcium 01/04/2018 8.1* 8.6 - 10.5 mg/dL Final   • eGFR Non African Amer 01/04/2018 62  >60 mL/min/1.73 Final   • BUN/Creatinine Ratio 01/04/2018 20.7  7.0 - 25.0 Final   • Anion Gap 01/04/2018 10.5  mmol/L Final   • Hemoglobin 01/04/2018 9.6* 11.9 - 15.5 g/dL Final   • Hematocrit 01/04/2018 30.5* 35.6 - 45.5 % Final   • Protime 01/04/2018 14.8* 11.7 - 14.2 Seconds Final   • INR 01/04/2018 1.20* 0.90 - 1.10 Final   • Protime 01/05/2018 15.9* 11.7 - 14.2 Seconds Final   • INR 01/05/2018 1.32* 0.90 - 1.10 Final   • WBC 01/05/2018 5.00  4.50 - 10.70 10*3/mm3 Final   • RBC 01/05/2018 3.47* 3.90 - 5.20 10*6/mm3 Final   • Hemoglobin 01/05/2018 10.0* 11.9 - 15.5 g/dL Final   • Hematocrit 01/05/2018 31.8* 35.6 - 45.5 % Final   • MCV  01/05/2018 91.6  80.5 - 98.2 fL Final   • MCH 01/05/2018 28.8  26.9 - 32.0 pg Final   • MCHC 01/05/2018 31.4* 32.4 - 36.3 g/dL Final   • RDW 01/05/2018 14.8* 11.7 - 13.0 % Final   • RDW-SD 01/05/2018 49.8  37.0 - 54.0 fl Final   • MPV 01/05/2018 11.2  6.0 - 12.0 fL Final   • Platelets 01/05/2018 217  140 - 500 10*3/mm3 Final   • Glucose 01/05/2018 116* 65 - 99 mg/dL Final   • BUN 01/05/2018 16  6 - 20 mg/dL Final   • Creatinine 01/05/2018 0.71  0.57 - 1.00 mg/dL Final   • Sodium 01/05/2018 136  136 - 145 mmol/L Final   • Potassium 01/05/2018 4.3  3.5 - 5.2 mmol/L Final   • Chloride 01/05/2018 102  98 - 107 mmol/L Final   • CO2 01/05/2018 24.7  22.0 - 29.0 mmol/L Final   • Calcium 01/05/2018 8.2* 8.6 - 10.5 mg/dL Final   • eGFR Non African Amer 01/05/2018 84  >60 mL/min/1.73 Final   • BUN/Creatinine Ratio 01/05/2018 22.5  7.0 - 25.0 Final   • Anion Gap 01/05/2018 9.3  mmol/L Final     Xr Chest Pa & Lateral    Result Date: 12/22/2017  Narrative: PA AND LATERAL CHEST X-RAY 12/22/2017  CLINICAL HISTORY: Preop for right knee replacement surgery scheduled on 01/03/2018. The patient is a former smoker, history of hypertension and right breast cancer.  This correlated to prior chest x-ray from [facility] 07/07/2017.  FINDINGS: The cardiomediastinal silhouette and pulmonary vasculature are within normal limits. The lungs are clear. Costophrenic angles are sharp.      Impression:  No active disease is seen in the chest with no change when compared to prior chest x-ray 07/07/2017.  This report was finalized on 12/22/2017 11:18 AM by Dr. Ricky Campos MD.              Assessment:  Patient Active Problem List   Diagnosis   • Osteoarthritis of knee   • Primary osteoarthritis of right knee   • Anxiety and depression   • Hypertension   • Disease of thyroid gland   • Morbid obesity with BMI of 45.0-49.9, adult      Acute Blood Loss Anemia  Post-operative Pain  Immobility      Plan:    Continue Physical Therapy,  increase mobility as tolerated.  Continue SCDs, Continue DVT prophalaxis.  Continue Pain management efforts  Continue Incisional care  Coumadin daily      Discharge Plan: tomorrow to Patient would like to go to a rehab ([facility]).  D/C planner is currently working on approval.    Date: 1/5/2018   Time: 7:56 AM    TAMI Long

## 2018-01-05 NOTE — PROGRESS NOTES
"     LOS: 2 days   Primary Care Physician: Poonam Temple MD     Subjective   Doing okay.  Had therapy twice today.  Was very sleepy after the second one this afternoon and has napped off and on.  No nausea or vomiting.    Vital Signs  Body mass index is 45.37 kg/(m^2).  Temp:  [97.9 °F (36.6 °C)-99.5 °F (37.5 °C)] 98.2 °F (36.8 °C)  Heart Rate:  [74-85] 74  Resp:  [16-20] 20  BP: (113-170)/(62-81) 113/63      Objective:  General Appearance:  In no acute distress.    Vital signs: (most recent): Blood pressure 113/63, pulse 74, temperature 98.2 °F (36.8 °C), temperature source Oral, resp. rate 20, height 162.6 cm (64\"), weight 120 kg (264 lb 5 oz), SpO2 98 %.    Lungs:  There are decreased breath sounds.  No wheezes, rales or rhonchi.    Heart: Normal rate.  Regular rhythm.  No murmur.   Abdomen: Abdomen is soft and non-distended.  Bowel sounds are normal.   There is no abdominal tenderness.   There is no splenomegaly. There is no hepatomegaly.   Extremities: There is dependent edema.  (Trace- 1+)  Neurological: Patient is alert.          Results Review:    I reviewed the patient's new clinical results.      Results from last 7 days  Lab Units 01/05/18  0341 01/04/18  0400   WBC 10*3/mm3 5.00  --    HEMOGLOBIN g/dL 10.0* 9.6*   PLATELETS 10*3/mm3 217  --        Results from last 7 days  Lab Units 01/05/18  0341 01/04/18  0400   SODIUM mmol/L 136 137   POTASSIUM mmol/L 4.3 4.7   CHLORIDE mmol/L 102 103   CO2 mmol/L 24.7 23.5   BUN mg/dL 16 19   CREATININE mg/dL 0.71 0.92   CALCIUM mg/dL 8.2* 8.1*   GLUCOSE mg/dL 116* 130*       Results from last 7 days  Lab Units 01/05/18  0341   INR  1.32*     Hemoglobin A1C:No results found for: HGBA1C    Glucose Range:No results found for: POCGLU    No results found for: FHAVDLUR98    No results found for: TSH    Assessment & Plan      Medication Review: Yes    Active Hospital Problems (** Indicates Principal Problem)    Diagnosis Date Noted   • Primary osteoarthritis of right knee " [M17.11] 01/03/2018   • Anxiety and depression [F41.8] 01/03/2018   • Hypertension [I10] 01/03/2018   • Disease of thyroid gland [E07.9] 01/03/2018   • Morbid obesity with BMI of 45.0-49.9, adult [E66.01, Z68.42] 01/03/2018      Resolved Hospital Problems    Diagnosis Date Noted Date Resolved   No resolved problems to display.       Assessment/Plan  1.  Hypertension with expected drop in blood pressure.  Medications adjusted with parameters written.  Asymptomatic  2.  Expected acute blood loss anemia  3.  Chronic pain syndrome.  Pain seems to be under control today.  4.  Restless leg syndrome.  She did not mention this today.  Not on any meds specific for it.    Plans for discharge tomorrow noted.  Please note medication changes and prescribe as written.    America Gates MD  01/05/18  6:01 PM

## 2018-01-05 NOTE — PROGRESS NOTES
Discharge Planning Assessment  [facility]     Patient Name: Natalie Terrazas  MRN: [id number]  Today's Date: 1/5/2018    Admit Date: 1/3/2018          Discharge Needs Assessment       01/05/18 1615    Living Environment    Lives With spouse    Living Arrangements house    Transportation Available car;family or friend will provide    Discharge Needs Assessment    Concerns To Be Addressed basic needs concerns    Readmission Within The Last 30 Days no previous admission in last 30 days    Anticipated Changes Related to Illness none    Equipment Currently Used at Home walker, rolling    Discharge Facility/Level Of Care Needs home with home health            Discharge Plan       01/05/18 1615    Case Management/Social Work Plan    Plan [facility]    Patient/Family In Agreement With Plan yes    Additional Comments Spoke with pt, verified correct information on facesheet and explained the role of CCP. Pt would like to d/c home with [facility], referral given to Ivonne with [facility] who states they are able to accept. Plan will be to d/c home with HH and family support.        Discharge Placement     Facility/Agency Request Status Selected? Address Phone Number Fax Number    [facility] Accepted    Yes [address] [address] [phone]    [facility] Pending - no bed available     [address] [facility] [address] [phone]                Demographic Summary     None            Functional Status       01/05/18 1611    Functional Status Current    Ambulation 3-->assistive equipment and person    Transferring 3-->assistive equipment and person    Toileting 3-->assistive equipment and person    Bathing 2-->assistive person    Dressing 2-->assistive person    Eating 0-->independent    Communication 0-->understands/communicates without difficulty    Swallowing (if score 2 or more for any item, consult Rehab Services) 0-->swallows  foods/liquids without difficulty    Functional Status Prior    Ambulation 1-->assistive equipment    Transferring 0-->independent    Toileting 0-->independent    Bathing 0-->independent    Dressing 0-->independent    Eating 0-->independent    Communication 0-->understands/communicates without difficulty    Swallowing 0-->swallows foods/liquids without difficulty            Psychosocial     None            Abuse/Neglect     None            Legal     None            Substance Abuse     None            Patient Forms     None          Suad Murphy RN

## 2018-01-06 VITALS
BODY MASS INDEX: 45.12 KG/M2 | SYSTOLIC BLOOD PRESSURE: 114 MMHG | HEIGHT: 64 IN | RESPIRATION RATE: 16 BRPM | WEIGHT: 264.31 LBS | OXYGEN SATURATION: 97 % | TEMPERATURE: 98.2 F | HEART RATE: 68 BPM | DIASTOLIC BLOOD PRESSURE: 67 MMHG

## 2018-01-06 LAB
INR PPP: 1.35 (ref 0.9–1.1)
PROTHROMBIN TIME: 16.2 SECONDS (ref 11.7–14.2)

## 2018-01-06 PROCEDURE — 97150 GROUP THERAPEUTIC PROCEDURES: CPT

## 2018-01-06 PROCEDURE — 85610 PROTHROMBIN TIME: CPT | Performed by: ORTHOPAEDIC SURGERY

## 2018-01-06 PROCEDURE — 25010000002 ENOXAPARIN PER 10 MG: Performed by: PHYSICIAN ASSISTANT

## 2018-01-06 PROCEDURE — 97110 THERAPEUTIC EXERCISES: CPT

## 2018-01-06 RX ORDER — WARFARIN SODIUM 10 MG/1
10 TABLET ORAL
Status: DISCONTINUED | OUTPATIENT
Start: 2018-01-06 | End: 2018-01-06 | Stop reason: HOSPADM

## 2018-01-06 RX ORDER — WARFARIN SODIUM 10 MG/1
10 TABLET ORAL
Qty: 40 TABLET | Refills: 0 | Status: SHIPPED | OUTPATIENT
Start: 2018-01-06

## 2018-01-06 RX ADMIN — DULOXETINE HYDROCHLORIDE 30 MG: 30 CAPSULE, DELAYED RELEASE ORAL at 08:10

## 2018-01-06 RX ADMIN — OXYCODONE HYDROCHLORIDE AND ACETAMINOPHEN 2 TABLET: 10; 325 TABLET ORAL at 03:46

## 2018-01-06 RX ADMIN — DOCUSATE SODIUM 100 MG: 100 CAPSULE, LIQUID FILLED ORAL at 08:10

## 2018-01-06 RX ADMIN — ATENOLOL 25 MG: 25 TABLET ORAL at 08:10

## 2018-01-06 RX ADMIN — OXYCODONE HYDROCHLORIDE AND ACETAMINOPHEN 2 TABLET: 10; 325 TABLET ORAL at 13:10

## 2018-01-06 RX ADMIN — OXYBUTYNIN CHLORIDE 10 MG: 10 TABLET, FILM COATED, EXTENDED RELEASE ORAL at 08:10

## 2018-01-06 RX ADMIN — DULOXETINE HYDROCHLORIDE 60 MG: 60 CAPSULE, DELAYED RELEASE ORAL at 08:11

## 2018-01-06 RX ADMIN — LEFLUNOMIDE 20 MG: 20 TABLET, FILM COATED ORAL at 08:10

## 2018-01-06 RX ADMIN — ENOXAPARIN SODIUM 40 MG: 40 INJECTION SUBCUTANEOUS at 10:58

## 2018-01-06 RX ADMIN — OXYCODONE HYDROCHLORIDE AND ACETAMINOPHEN 2 TABLET: 10; 325 TABLET ORAL at 08:10

## 2018-01-06 RX ADMIN — LEVOTHYROXINE SODIUM 200 MCG: 100 TABLET ORAL at 06:53

## 2018-01-06 NOTE — PLAN OF CARE
Problem: Patient Care Overview (Adult)  Goal: Plan of Care Review  Outcome: Ongoing (interventions implemented as appropriate)   01/06/18 1522   Coping/Psychosocial Response Interventions   Plan Of Care Reviewed With patient   Patient Care Overview   Progress improving   Outcome Evaluation   Outcome Summary/Follow up Plan Pain tolerable with PO analgesics. Ambulating well with walker. VSS. Hx htn, BP stable, lisinopril DC'd for discharge. DC home with home health.     Goal: Adult Individualization and Mutuality  Outcome: Ongoing (interventions implemented as appropriate)    Goal: Discharge Needs Assessment  Outcome: Ongoing (interventions implemented as appropriate)      Problem: Knee Replacement, Total (Adult)  Goal: Signs and Symptoms of Listed Potential Problems Will be Absent or Manageable (Knee Replacement, Total)  Outcome: Ongoing (interventions implemented as appropriate)   01/06/18 1522   Knee Replacement, Total   Problems Assessed (Total Knee Replacement) all   Problems Present (Total Knee Replacement) pain;decreased range of motion;functional decline/self care deficit       Problem: Fall Risk (Adult)  Goal: Absence of Falls  Outcome: Ongoing (interventions implemented as appropriate)   01/06/18 1522   Fall Risk (Adult)   Absence of Falls achieves outcome

## 2018-01-06 NOTE — THERAPY TREATMENT NOTE
Acute Care - Physical Therapy Treatment Note  [facility]     Patient Name: Natalie Terrazas  : 1960  MRN: [id number]  Today's Date: 2018  Onset of Illness/Injury or Date of Surgery Date: 18  Date of Referral to PT: 18  Referring Physician: Dr. Velasco    Admit Date: 1/3/2018    Visit Dx:    ICD-10-CM ICD-9-CM   1. S/P total knee arthroplasty, right Z96.651 V43.65     Patient Active Problem List   Diagnosis   • Osteoarthritis of knee   • Primary osteoarthritis of right knee   • Anxiety and depression   • Hypertension   • Disease of thyroid gland   • Morbid obesity with BMI of 45.0-49.9, adult               Adult Rehabilitation Note       18 0930 18 1400 18 0900    Rehab Assessment/Intervention    Discipline physical therapist  -AA physical therapy assistant  -CW physical therapy assistant  -CW    Document Type therapy note (daily note)  -AA therapy note (daily note)  -CW therapy note (daily note)  -CW    Subjective Information agree to therapy;complains of;weakness;fatigue;pain  -AA agree to therapy;complains of;pain  -CW agree to therapy;complains of;pain  -CW    Patient Effort, Rehab Treatment good  -AA good  -CW good  -CW    Precautions/Limitations fall precautions  -AA fall precautions  -CW fall precautions  -CW    Recorded by [AA] Otilia Quiles, PT [CW] Louis Mijares, PTA [CW] Louis Mijares, PTA    Vital Signs    O2 Delivery Pre Treatment  room air  -CW room air  -CW    Recorded by  [CW] Louis Mijares, PTA [CW] Louis Mijares, PTA    Pain Assessment    Pain Assessment 0-10  -AA 0-10  -CW 0-10  -CW    Pain Score 8  -AA 8  -CW 9  -CW    Post Pain Score 8  -AA 8  -CW 9  -CW    Pain Type Surgical pain  -AA Surgical pain  -CW Surgical pain  -CW    Pain Location Knee  -AA Knee  -CW Knee  -CW    Pain Orientation Right  -AA Right  -CW Right  -CW    Pain Intervention(s) Ambulation/increased activity;Repositioned  -AA Repositioned;Ambulation/increased  activity  -CW Repositioned;Ambulation/increased activity  -CW    Response to Interventions tolerated  -AA whitley  -CW whitley  -CW    Recorded by [AA] Otilia Quiles, PT [CW] Louis Mijares PTA [CW] Louis Mijares PTA    Cognitive Assessment/Intervention    Current Cognitive/Communication Assessment functional  -AA functional  -CW functional  -CW    Orientation Status oriented x 4  -AA oriented x 4  -CW oriented x 4  -CW    Follows Commands/Answers Questions 100% of the time  -% of the time  -% of the time  -CW    Personal Safety WNL/WFL  -AA WNL/WFL  -CW WNL/WFL  -CW    Personal Safety Interventions fall prevention program maintained;gait belt;nonskid shoes/slippers when out of bed  -AA fall prevention program maintained;gait belt;muscle strengthening facilitated;nonskid shoes/slippers when out of bed  -CW fall prevention program maintained;gait belt;muscle strengthening facilitated;nonskid shoes/slippers when out of bed  -CW    Recorded by [AA] Otilia Quiles, PT [CW] Louis Mijares, PTA [CW] Louis Mijares PTA    ROM (Range of Motion)    General ROM Detail AROM R knee 8-80  -AA  5-85  -CW    Recorded by [AA] Otilia Quiles, PT  [CW] Louis Mijares PTA    Bed Mobility, Assessment/Treatment    Bed Mob, Supine to Sit, [place] not tested  -AA supervision required  -CW supervision required  -CW    Bed Mob, Sit to Supine, [place] not tested  -AA supervision required  -CW supervision required  -CW    Bed Mobility, Comment in chair  -AA      Recorded by [AA] Otilia Quiles, PT [CW] Louis Mijares, NICOLAS [CW] Louis Mijares PTA    Transfer Assessment/Treatment    Transfers, Sit-Stand [place] supervision required  -AA supervision required  -CW supervision required  -CW    Transfers, Stand-Sit [place] supervision required  -AA supervision required  -CW supervision required  -CW    Transfers, Sit-Stand-Sit, Assist Device rolling walker  -AA rolling  walker  -CW rolling walker  -CW    Recorded by [AA] Otilia Quiles, PT [CW] Louis Mijares, PTA [CW] Louis Mijares PTA    Gait Assessment/Treatment    Gait, [place] Level supervision required  -AA supervision required  -CW supervision required  -CW    Gait, Assistive Device rolling walker  -AA rolling walker  -CW rolling walker  -CW    Gait, Distance (Feet) 100  -  -  -CW    Gait, Gait Pattern Analysis swing-through gait  -AA swing-to gait  -CW swing-to gait  -CW    Gait, Gait Deviations weight-shifting ability decreased;step length decreased  -AA right:;antalgic;solo decreased;step length decreased;stride length decreased  -CW right:;antalgic;solo decreased  -CW    Recorded by [AA] Otilia Quiles PT [CW] Louis Mijares, PTA [CW] Louis Mijares PTA    Stairs Assessment/Treatment    Number of Stairs 4  -AA      Stairs, Handrail Location both sides  -AA      Stairs, [place] Level supervision required  -AA      Stairs, Technique Used step to step (ascending);step to step (descending)  -AA      Recorded by [AA] Otilia Quiles PT      Therapy Exercises    Exercise Protocols total knee  -AA total knee  -CW total knee  -CW    Total Knee Exercises right:;30 reps;completed protocol  -AA right:;30 reps;completed protocol  -CW right:;25 reps;completed protocol  -CW    Recorded by [AA] Otilia Quiles PT [CW] Louis Mijares, PTA [CW] Louis Mijares PTA    Positioning and Restraints    Pre-Treatment Position sitting in chair/recliner  -AA sitting in chair/recliner  -CW sitting in chair/recliner  -CW    Post Treatment Position chair  -AA chair  -CW chair  -CW    In Chair sitting;reclined;call light within reach;encouraged to call for assist  -AA notified nsg;reclined;call light within reach;encouraged to call for assist;with family/caregiver  -CW notified nsg;reclined;call light within reach;encouraged to call for assist  -CW    Recorded by [AA] Otilia  Kb, PT [CW] Louis Mijares PTA [CW] Louis Mijares PTA      01/04/18 1500 01/04/18 1000       Rehab Assessment/Intervention    Discipline physical therapy assistant  -CW physical therapy assistant  -CW     Document Type therapy note (daily note)  -CW therapy note (daily note)  -CW     Subjective Information agree to therapy;complains of;pain  -CW agree to therapy;complains of;pain  -CW     Patient Effort, Rehab Treatment good  -CW good  -CW     Precautions/Limitations fall precautions  -CW fall precautions  -CW     Recorded by [CW] Louis Mijares PTA [CW] Louis Mijares PTA     Vital Signs    O2 Delivery Pre Treatment room air  -CW room air  -CW     Recorded by [CW] Louis Mijares PTA [CW] Louis Mijares PTA     Pain Assessment    Pain Assessment 0-10  -CW 0-10  -CW     Pain Score 6  -CW 6  -CW     Post Pain Score 6  -CW 6  -CW     Pain Type Surgical pain  -CW Surgical pain  -CW     Pain Location Knee  -CW Knee  -CW     Pain Orientation Right  -CW Right  -CW     Pain Intervention(s) Repositioned;Ambulation/increased activity  -CW Repositioned;Ambulation/increased activity  -CW     Response to Interventions whitley  -CW whitley  -CW     Recorded by [CW] Louis Mijares PTA [CW] Louis Mijares PTA     Cognitive Assessment/Intervention    Current Cognitive/Communication Assessment functional  -CW functional  -CW     Orientation Status oriented x 4  -CW oriented x 4  -CW     Follows Commands/Answers Questions 100% of the time  -% of the time  -CW     Personal Safety WNL/WFL  -CW WNL/WFL  -CW     Personal Safety Interventions fall prevention program maintained;gait belt;muscle strengthening facilitated;nonskid shoes/slippers when out of bed  -CW fall prevention program maintained;gait belt;muscle strengthening facilitated;nonskid shoes/slippers when out of bed  -CW     Recorded by [CW] Louis Mijares PTA [CW] Louis Mijares PTA     ROM (Range of Motion)    General  ROM Detail  0-80  -CW     Recorded by  [CW] Louis Mijares PTA     Bed Mobility, Assessment/Treatment    Bed Mob, Supine to Sit, [place] supervision required  -CW supervision required  -CW     Bed Mob, Sit to Supine, [place] supervision required  -CW supervision required  -CW     Recorded by [CW] Louis Mijares PTA [CW] Louis Mijares PTA     Transfer Assessment/Treatment    Transfers, Sit-Stand [place] supervision required  -CW supervision required  -CW     Transfers, Stand-Sit [place] supervision required  -CW supervision required  -CW     Transfers, Sit-Stand-Sit, Assist Device rolling walker  -CW rolling walker  -CW     Recorded by [CW] Louis Mijares PTA [CW] Louis Mijares PTA     Gait Assessment/Treatment    Gait, [place] Level supervision required  -CW supervision required  -CW     Gait, Assistive Device rolling walker  -CW rolling walker  -CW     Gait, Distance (Feet) 100  -  -CW     Gait, Gait Pattern Analysis swing-to gait  -CW swing-to gait  -CW     Gait, Gait Deviations right:;antalgic;solo decreased;step length decreased;stride length decreased  -CW right:;antalgic;solo decreased;step length decreased;stride length decreased  -CW     Recorded by [CW] Louis Mijares PTA [CW] Louis Mijares PTA     Stairs Assessment/Treatment    Number of Stairs  4  -CW     Stairs, Handrail Location  both sides  -CW     Stairs, [place] Level  supervision required  -CW     Stairs, Technique Used  step to step (ascending);step to step (descending)  -CW     Recorded by  [CW] Louis Mijares PTA     Therapy Exercises    Exercise Protocols total knee  -CW total knee  -CW     Total Knee Exercises right:;20 reps;completed protocol  -CW right:;15 reps;completed protocol  -CW     Recorded by [CW] Louis Mijares PTA [CW] Louis Mijares PTA     Positioning and Restraints    Pre-Treatment Position sitting in chair/recliner  -CW sitting in  chair/recliner  -CW     Post Treatment Position chair  -CW chair  -CW     In Chair notified nsg;reclined;call light within reach;encouraged to call for assist  -CW notified nsg;reclined;call light within reach;encouraged to call for assist  -CW     Recorded by [CW] Louis Mijares PTA [CW] Louis Mijares PTA       User Key  (r) = Recorded By, (t) = Taken By, (c) = Cosigned By    Initials Name Effective Dates    CW Louis Mijares, PTA 12/13/16 -     AA Otilia Kb, PT 09/05/17 -                 IP PT Goals       01/03/18 1620          Bed Mobility PT LTG    Bed Mobility PT LTG, Date Established 01/03/18  -MA      Bed Mobility PT LTG, Time to Achieve 1 wk  -MA      Bed Mobility PT LTG, Activity Type all bed mobility  -MA      Bed Mobility PT LTG, [place] Level independent  -MA      Transfer Training PT LTG    Transfer Training PT LTG, Date Established 01/03/18  -MA      Transfer Training PT LTG, Time to Achieve 1 wk  -MA      Transfer Training PT LTG, Activity Type all transfers  -MA      Transfer Training PT LTG, [place] Level supervision required  -MA      Transfer Training PT LTG, Assist Device walker, rolling  -MA      Gait Training PT LTG    Gait Training Goal PT LTG, Date Established 01/03/18  -MA      Gait Training Goal PT LTG, Time to Achieve 1 wk  -MA      Gait Training Goal PT LTG, [place] Level supervision required  -MA      Gait Training Goal PT LTG, Assist Device walker, rolling  -MA      Gait Training Goal PT LTG, Distance to Achieve 150  -MA      Stair Training PT LTG    Stair Training Goal PT LTG, Date Established 01/03/18  -MA      Stair Training Goal PT LTG, Time to Achieve 1 wk  -MA      Stair Training Goal PT LTG, Number of Steps 3  -MA      Stair Training Goal PT LTG, [place] Level contact guard assist  -MA      Stair Training Goal PT LTG, Assist Device --   no HR  -MA      Range of Motion PT LTG    Range of Motion Goal PT LTG, Date Established 01/03/18  -MA       Range of Motion Goal PT LTG, Time to Achieve 1 wk  -MA      Range fo Motion Goal PT LTG, Joint R knee  -MA      Range of Motion Goal PT LTG, AROM Measure 5-90'  -MA        User Key  (r) = Recorded By, (t) = Taken By, (c) = Cosigned By    Initials Name Provider Type    DION Babb, PT Physical Therapist          Physical Therapy Education     Title: PT OT SLP Therapies (Done)     Topic: Physical Therapy (Done)     Point: Mobility training (Done)    Learning Progress Summary    Learner Readiness Method Response Comment Documented by Status   Patient Acceptance E [facility] 01/06/18 1321 Done    Acceptance E,TB VU,DU   01/04/18 1035 Done    Acceptance E VU  MA 01/03/18 1623 Done    Acceptance E [facility] 01/03/18 1602 Done               Point: Home exercise program (Done)    Learning Progress Summary    Learner Readiness Method Response Comment Documented by Status   Patient Acceptance E [facility] 01/06/18 1321 Done    Acceptance E,TB VU,DU  CW 01/04/18 1035 Done    Acceptance E VU  MA 01/03/18 1623 Done    Acceptance E VU  MA 01/03/18 1602 Done               Point: Body mechanics (Done)    Learning Progress Summary    Learner Readiness Method Response Comment Documented by Status   Patient Acceptance E [facility] 01/06/18 1321 Done    Acceptance E,TB VU,DU  CW 01/04/18 1035 Done    Acceptance E VU  MA 01/03/18 1623 Done    Acceptance E VU  MA 01/03/18 1602 Done                      User Key     Initials Effective Dates Name Provider Type Discipline    MA 12/13/16 -  Roseline Babb, PT Physical Therapist PT     12/13/16 -  Louis Mijares, PTA Physical Therapy Assistant PT     09/05/17 -  Otilia Quiles, PT Physical Therapist PT                    PT Recommendation and Plan  Anticipated Equipment Needs At Discharge: bedside commode (requested bariatric BSC- 1/3/18)  Anticipated Discharge Disposition: home with assist, home with home health  Planned Therapy Interventions: balance training, bed mobility  training, gait training, home exercise program, patient/family education, postural re-education, stair training, strengthening, transfer training  PT Frequency: 2 times/day  Plan of Care Review  Plan Of Care Reviewed With: patient  Progress: improving  Outcome Summary/Follow up Plan: Pt able to perform mobilty SPV with use of RW with no safety concerns. Pt ambualted 100ft RW SPV and ascended/descended 4 steps with B handrail with no LOB. Pt may continue to benfit from skilled PT for improved mobilty.           Outcome Measures       01/06/18 1300 01/04/18 1000 01/03/18 1600    How much help from another person do you currently need...    Turning from your back to your side while in flat bed without using bedrails? 4  -AA 4  -CW 4  -MA    Moving from lying on back to sitting on the side of a flat bed without bedrails? 4  -AA 4  -CW 4  -MA    Moving to and from a bed to a chair (including a wheelchair)? 4  -AA 3  -CW 3  -MA    Standing up from a chair using your arms (e.g., wheelchair, bedside chair)? 4  -AA 3  -CW 3  -MA    Climbing 3-5 steps with a railing? 3  -AA 3  -CW 3  -MA    To walk in hospital room? 4  -AA 3  -CW 3  -MA    AM-PAC 6 Clicks Score 23  -AA 20  -CW 20  -MA    Functional Assessment    Outcome Measure Options AM-PAC 6 Clicks Basic Mobility (PT)  -AA AM-PAC 6 Clicks Basic Mobility (PT)  -CW AM-PAC 6 Clicks Basic Mobility (PT)  -MA      User Key  (r) = Recorded By, (t) = Taken By, (c) = Cosigned By    Initials Name Provider Type    DION Babb, PT Physical Therapist    CW Louis Mijares, PTA Physical Therapy Assistant    RASHEEDA Quiles, PT Physical Therapist           Time Calculation:         PT Charges       01/06/18 1322          Time Calculation    Start Time 0930  -AA      Stop Time 1015  -AA      Time Calculation (min) 45 min  -AA      PT Received On 01/06/18  -AA      PT - Next Appointment 01/06/18  -AA        User Key  (r) = Recorded By, (t) = Taken By, (c) = Cosigned By     Initials Name Provider Type    AA Otilia Quiles, ADRIEL Physical Therapist          Therapy Charges for Today     Code Description Service Date Service Provider Modifiers Qty    71193586191 HC PT THER PROC EA 15 MIN 1/6/2018 Otilia Quiles, PT GP 2    95687340366 HC PT THER SUPP EA 15 MIN 1/6/2018 Otilia Quiles PT GP 1          PT G-Codes  Outcome Measure Options: AM-PAC 6 Clicks Basic Mobility (PT)    Otilia Quiles PT  1/6/2018

## 2018-01-06 NOTE — PLAN OF CARE
Problem: Patient Care Overview (Adult)  Goal: Plan of Care Review  Outcome: Ongoing (interventions implemented as appropriate)   01/06/18 0320   Coping/Psychosocial Response Interventions   Plan Of Care Reviewed With patient   Patient Care Overview   Progress improving   Outcome Evaluation   Outcome Summary/Follow up Plan pain under control with pain meds. vitals stable. ambulates with walker. voiding without difficulty. educated patient on importance of monitoring blood pressure given h/o hypertension. verbalises understanding.     Goal: Adult Individualization and Mutuality  Outcome: Ongoing (interventions implemented as appropriate)    Goal: Discharge Needs Assessment  Outcome: Ongoing (interventions implemented as appropriate)      Problem: Knee Replacement, Total (Adult)  Goal: Signs and Symptoms of Listed Potential Problems Will be Absent or Manageable (Knee Replacement, Total)  Outcome: Ongoing (interventions implemented as appropriate)      Problem: Fall Risk (Adult)  Goal: Absence of Falls  Outcome: Ongoing (interventions implemented as appropriate)

## 2018-01-06 NOTE — PLAN OF CARE
Problem: Patient Care Overview (Adult)  Goal: Plan of Care Review  Outcome: Ongoing (interventions implemented as appropriate)   01/05/18 2010   Coping/Psychosocial Response Interventions   Plan Of Care Reviewed With patient   Patient Care Overview   Progress improving   Outcome Evaluation   Outcome Summary/Follow up Plan Pt pain managed with PO analgesic. Scant amount of drainage upon dressing change this evening. Ambulating well with walker. Pt c/o sore throat, Dr Gates notified, chloraseptic lozenges ordered. Hx htn, BP stable, continue to monitor and admin home meds within parameters. Plan DC home tomorrow.     Goal: Adult Individualization and Mutuality  Outcome: Ongoing (interventions implemented as appropriate)    Goal: Discharge Needs Assessment  Outcome: Ongoing (interventions implemented as appropriate)      Problem: Knee Replacement, Total (Adult)  Goal: Signs and Symptoms of Listed Potential Problems Will be Absent or Manageable (Knee Replacement, Total)  Outcome: Ongoing (interventions implemented as appropriate)   01/05/18 2010   Knee Replacement, Total   Problems Assessed (Total Knee Replacement) all   Problems Present (Total Knee Replacement) pain;decreased range of motion;functional decline/self care deficit       Problem: Fall Risk (Adult)  Goal: Absence of Falls  Outcome: Ongoing (interventions implemented as appropriate)   01/05/18 2010   Fall Risk (Adult)   Absence of Falls achieves outcome

## 2018-01-06 NOTE — PLAN OF CARE
Problem: Patient Care Overview (Adult)  Goal: Plan of Care Review   01/06/18 1529   Coping/Psychosocial Response Interventions   Plan Of Care Reviewed With patient   Patient Care Overview   Progress improving   Outcome Evaluation   Outcome Summary/Follow up Plan Pt able to ambulate 125ft RW SPV with no LOB. Pt able to perform transfers SPV with RW. PT to d/c as going home today with follow up home health PT.

## 2018-01-06 NOTE — PLAN OF CARE
Problem: Patient Care Overview (Adult)  Goal: Plan of Care Review   01/06/18 1321   Coping/Psychosocial Response Interventions   Plan Of Care Reviewed With patient   Patient Care Overview   Progress improving   Outcome Evaluation   Outcome Summary/Follow up Plan Pt able to perform mobilty SPV with use of RW with no safety concerns. Pt ambualted 100ft RW SPV and ascended/descended 4 steps with B handrail with no LOB. Pt may continue to benfit from skilled PT for improved mobilty.

## 2018-01-06 NOTE — THERAPY DISCHARGE NOTE
Acute Care - Physical Therapy Treatment Note/Discharge  [facility]     Patient Name: Natalie Terrazas  : 1960  MRN: [id number]  Today's Date: 2018  Onset of Illness/Injury or Date of Surgery Date: 18  Date of Referral to PT: 18  Referring Physician: Dr. Velasco    Admit Date: 1/3/2018    Visit Dx:    ICD-10-CM ICD-9-CM   1. S/P total knee arthroplasty, right Z96.651 V43.65     Patient Active Problem List   Diagnosis   • Osteoarthritis of knee   • Primary osteoarthritis of right knee   • Anxiety and depression   • Hypertension   • Disease of thyroid gland   • Morbid obesity with BMI of 45.0-49.9, adult       Physical Therapy Education     Title: PT OT SLP Therapies (Resolved)     Topic: Physical Therapy (Resolved)     Point: Mobility training (Resolved)    Learning Progress Summary    Learner Readiness Method Response Comment Documented by Status   Patient Acceptance E VU   18 1321 Done    Acceptance E,SJ VERGARA   18 1035 Done    Acceptance E VU  MA 18 1623 Done    Acceptance E VU  MA 18 1602 Done               Point: Home exercise program (Resolved)    Learning Progress Summary    Learner Readiness Method Response Comment Documented by Status   Patient Acceptance E VU   18 1321 Done    Acceptance E,TB SJ ESTRELLA 18 1035 Done    Acceptance E VU  MA 18 1623 Done    Acceptance E VU  MA 18 1602 Done               Point: Body mechanics (Resolved)    Learning Progress Summary    Learner Readiness Method Response Comment Documented by Status   Patient Acceptance E VU   18 1321 Done    Acceptance E,TB SJ ESTRELLA 18 1035 Done    Acceptance E VU  MA 18 1623 Done    Acceptance E VU  MA 18 1602 Done                      User Key     Initials Effective Dates Name Provider Type Discipline    MA 16 -  Roseline Babb PT Physical Therapist PT     16 -  Louis Mijares PTA Physical Therapy Assistant PT      09/05/17 -  Otilia Quiles, PT Physical Therapist PT                    IP PT Goals       01/03/18 1620          Bed Mobility PT LTG    Bed Mobility PT LTG, Date Established 01/03/18  -MA      Bed Mobility PT LTG, Time to Achieve 1 wk  -MA      Bed Mobility PT LTG, Activity Type all bed mobility  -MA      Bed Mobility PT LTG, [place] Level independent  -MA      Transfer Training PT LTG    Transfer Training PT LTG, Date Established 01/03/18  -MA      Transfer Training PT LTG, Time to Achieve 1 wk  -MA      Transfer Training PT LTG, Activity Type all transfers  -MA      Transfer Training PT LTG, [place] Level supervision required  -MA      Transfer Training PT LTG, Assist Device walker, rolling  -MA      Gait Training PT LTG    Gait Training Goal PT LTG, Date Established 01/03/18  -MA      Gait Training Goal PT LTG, Time to Achieve 1 wk  -MA      Gait Training Goal PT LTG, [place] Level supervision required  -MA      Gait Training Goal PT LTG, Assist Device walker, rolling  -MA      Gait Training Goal PT LTG, Distance to Achieve 150  -MA      Stair Training PT LTG    Stair Training Goal PT LTG, Date Established 01/03/18  -MA      Stair Training Goal PT LTG, Time to Achieve 1 wk  -MA      Stair Training Goal PT LTG, Number of Steps 3  -MA      Stair Training Goal PT LTG, [place] Level contact guard assist  -MA      Stair Training Goal PT LTG, Assist Device --   no HR  -MA      Range of Motion PT LTG    Range of Motion Goal PT LTG, Date Established 01/03/18  -MA      Range of Motion Goal PT LTG, Time to Achieve 1 wk  -MA      Range fo Motion Goal PT LTG, Joint R knee  -MA      Range of Motion Goal PT LTG, AROM Measure 5-90'  -MA        User Key  (r) = Recorded By, (t) = Taken By, (c) = Cosigned By    Initials Name Provider Type    DION Babb, PT Physical Therapist              Adult Rehabilitation Note       01/06/18 1400 01/06/18 0930 01/05/18 1400    Rehab Assessment/Intervention     Discipline physical therapist  -AA physical therapist  -AA physical therapy assistant  -CW    Document Type therapy note (daily note);discharge summary  -AA therapy note (daily note)  -AA therapy note (daily note)  -CW    Subjective Information agree to therapy;no complaints  -AA agree to therapy;complains of;weakness;fatigue;pain  -AA agree to therapy;complains of;pain  -CW    Patient Effort, Rehab Treatment good  -AA good  -AA good  -CW    Precautions/Limitations fall precautions  -AA fall precautions  -AA fall precautions  -CW    Recorded by [AA] Otilia Quiles PT [AA] Otilia Quiles PT [CW] Louis Mijares, PTA    Vital Signs    O2 Delivery Pre Treatment   room air  -CW    Recorded by   [CW] Louis Mijares, PTA    Pain Assessment    Pain Assessment 0-10  -AA 0-10  -AA 0-10  -CW    Pain Score 9  -AA 8  -AA 8  -CW    Post Pain Score  8  -AA 8  -CW    Pain Type Surgical pain  -AA Surgical pain  -AA Surgical pain  -CW    Pain Location Knee  -AA Knee  -AA Knee  -CW    Pain Orientation Right  -AA Right  -AA Right  -CW    Pain Intervention(s) Ambulation/increased activity  -AA Ambulation/increased activity;Repositioned  -AA Repositioned;Ambulation/increased activity  -CW    Response to Interventions  tolerated  -AA whitley  -CW    Recorded by [AA] Otilia Quiles PT [AA] Otilia Quiles PT [CW] Louis Mijares, PTA    Cognitive Assessment/Intervention    Current Cognitive/Communication Assessment functional  -AA functional  -AA functional  -CW    Orientation Status oriented x 4  -AA oriented x 4  -AA oriented x 4  -CW    Follows Commands/Answers Questions 100% of the time  -% of the time  -% of the time  -CW    Personal Safety WNL/WFL  -AA WNL/WFL  -AA WNL/WFL  -CW    Personal Safety Interventions fall prevention program maintained;gait belt;nonskid shoes/slippers when out of bed  -AA fall prevention program maintained;gait belt;nonskid shoes/slippers when out of bed  -AA fall  prevention program maintained;gait belt;muscle strengthening facilitated;nonskid shoes/slippers when out of bed  -CW    Recorded by [AA] Otilia Quiles PT [AA] Otilia Quiles, PT [CW] Louis Mijares, PTA    ROM (Range of Motion)    General ROM Detail  AROM R knee 8-80  -AA     Recorded by  [AA] Otilia Quiles PT     Bed Mobility, Assessment/Treatment    Bed Mob, Supine to Sit, [place] not tested  -AA not tested  -AA supervision required  -CW    Bed Mob, Sit to Supine, [place] not tested  -AA not tested  -AA supervision required  -CW    Bed Mobility, Comment  in chair  -AA     Recorded by [AA] Otilia Quiles PT [AA] Otilia Quiles PT [CW] Louis Mijares, PTA    Transfer Assessment/Treatment    Transfers, Sit-Stand [place] supervision required  -AA supervision required  -AA supervision required  -CW    Transfers, Stand-Sit [place] supervision required  -AA supervision required  -AA supervision required  -CW    Transfers, Sit-Stand-Sit, Assist Device rolling walker  -AA rolling walker  -AA rolling walker  -CW    Recorded by [AA] Otilia Quiles PT [AA] Otilia Quiles, PT [CW] Louis Mijares, PTA    Gait Assessment/Treatment    Gait, [place] Level supervision required  -AA supervision required  -AA supervision required  -CW    Gait, Assistive Device rolling walker  -AA rolling walker  -AA rolling walker  -CW    Gait, Distance (Feet) 125  -  -  -CW    Gait, Gait Pattern Analysis swing-through gait  -AA swing-through gait  -AA swing-to gait  -CW    Gait, Gait Deviations  weight-shifting ability decreased;step length decreased  -AA right:;antalgic;solo decreased;step length decreased;stride length decreased  -CW    Gait, Comment no LOB or concerns noted  -AA      Recorded by [AA] Otilia Quiles, PT [AA] Otilia Quiles, PT [CW] Louis Mijares, PTA    Stairs Assessment/Treatment    Number of Stairs  4  -AA     Stairs, Handrail  Location  both sides  -AA     Stairs, [place] Level  supervision required  -AA     Stairs, Technique Used  step to step (ascending);step to step (descending)  -AA     Recorded by  [AA] Otilia Quiles PT     Therapy Exercises    Exercise Protocols total knee  -AA total knee  -AA total knee  -CW    Total Knee Exercises right:;30 reps;completed protocol  -AA right:;30 reps;completed protocol  -AA right:;30 reps;completed protocol  -CW    Recorded by [AA] Otilia Quiles PT [AA] Otilia Quiles PT [CW] Louis Mijares PTA    Positioning and Restraints    Pre-Treatment Position sitting in chair/recliner  -AA sitting in chair/recliner  -AA sitting in chair/recliner  -CW    Post Treatment Position chair  -AA chair  -AA chair  -CW    In Chair sitting;reclined;call light within reach;encouraged to call for assist  -AA sitting;reclined;call light within reach;encouraged to call for assist  -AA notified nsg;reclined;call light within reach;encouraged to call for assist;with family/caregiver  -CW    Recorded by [AA] Otilia Quiles PT [AA] Otilia Quiles PT [CW] Louis Mijares PTA      01/05/18 0900 01/04/18 1500 01/04/18 1000    Rehab Assessment/Intervention    Discipline physical therapy assistant  -CW physical therapy assistant  -CW physical therapy assistant  -CW    Document Type therapy note (daily note)  -CW therapy note (daily note)  -CW therapy note (daily note)  -CW    Subjective Information agree to therapy;complains of;pain  -CW agree to therapy;complains of;pain  -CW agree to therapy;complains of;pain  -CW    Patient Effort, Rehab Treatment good  -CW good  -CW good  -CW    Precautions/Limitations fall precautions  -CW fall precautions  -CW fall precautions  -CW    Recorded by [CW] Louis Mijares PTA [CW] Louis Mijares PTA [CW] Louis Mijares PTA    Vital Signs    O2 Delivery Pre Treatment room air  -CW room air  -CW room air  -CW    Recorded by [CW] Louis KO  NICOLAS Mijares [CW] Louis Mijares PTA [CW] Louis Mijares PTA    Pain Assessment    Pain Assessment 0-10  -CW 0-10  -CW 0-10  -CW    Pain Score 9  -CW 6  -CW 6  -CW    Post Pain Score 9  -CW 6  -CW 6  -CW    Pain Type Surgical pain  -CW Surgical pain  -CW Surgical pain  -CW    Pain Location Knee  -CW Knee  -CW Knee  -CW    Pain Orientation Right  -CW Right  -CW Right  -CW    Pain Intervention(s) Repositioned;Ambulation/increased activity  -CW Repositioned;Ambulation/increased activity  -CW Repositioned;Ambulation/increased activity  -CW    Response to Interventions whitley  -CW whitley  -CW whitley  -CW    Recorded by [CW] Louis Mijares PTA [CW] Louis Mijares PTA [CW] Louis Mijares PTA    Cognitive Assessment/Intervention    Current Cognitive/Communication Assessment functional  -CW functional  -CW functional  -CW    Orientation Status oriented x 4  -CW oriented x 4  -CW oriented x 4  -CW    Follows Commands/Answers Questions 100% of the time  -% of the time  -% of the time  -CW    Personal Safety WNL/WFL  -CW WNL/WFL  -CW WNL/WFL  -CW    Personal Safety Interventions fall prevention program maintained;gait belt;muscle strengthening facilitated;nonskid shoes/slippers when out of bed  -CW fall prevention program maintained;gait belt;muscle strengthening facilitated;nonskid shoes/slippers when out of bed  -CW fall prevention program maintained;gait belt;muscle strengthening facilitated;nonskid shoes/slippers when out of bed  -CW    Recorded by [CW] Louis Mijares PTA [CW] Louis Mijares PTA [CW] Louis Mijares PTA    ROM (Range of Motion)    General ROM Detail 5-85  -CW  0-80  -CW    Recorded by [CW] Louis Mijares PTA  [CW] Louis Mijares PTA    Bed Mobility, Assessment/Treatment    Bed Mob, Supine to Sit, [place] supervision required  -CW supervision required  -CW supervision required  -CW    Bed Mob, Sit to Supine, [place] supervision required  -CW  supervision required  -CW supervision required  -CW    Recorded by [CW] Louis Mijares PTA [CW] Louis Mijares PTA [CW] Louis Mijares PTA    Transfer Assessment/Treatment    Transfers, Sit-Stand [place] supervision required  -CW supervision required  -CW supervision required  -CW    Transfers, Stand-Sit [place] supervision required  -CW supervision required  -CW supervision required  -CW    Transfers, Sit-Stand-Sit, Assist Device rolling walker  -CW rolling walker  -CW rolling walker  -CW    Recorded by [CW] Louis Mijares PTA [CW] Louis Mijares PTA [CW] Louis Mijares PTA    Gait Assessment/Treatment    Gait, [place] Level supervision required  -CW supervision required  -CW supervision required  -CW    Gait, Assistive Device rolling walker  -CW rolling walker  -CW rolling walker  -CW    Gait, Distance (Feet) 100  -  -  -CW    Gait, Gait Pattern Analysis swing-to gait  -CW swing-to gait  -CW swing-to gait  -CW    Gait, Gait Deviations right:;antalgic;solo decreased  -CW right:;antalgic;solo decreased;step length decreased;stride length decreased  -CW right:;antalgic;solo decreased;step length decreased;stride length decreased  -CW    Recorded by [CW] Louis Mijares PTA [CW] Louis Mijares PTA [CW] Louis Mijares PTA    Stairs Assessment/Treatment    Number of Stairs   4  -CW    Stairs, Handrail Location   both sides  -CW    Stairs, [place] Level   supervision required  -CW    Stairs, Technique Used   step to step (ascending);step to step (descending)  -CW    Recorded by   [CW] Louis Mijares PTA    Therapy Exercises    Exercise Protocols total knee  -CW total knee  -CW total knee  -CW    Total Knee Exercises right:;25 reps;completed protocol  -CW right:;20 reps;completed protocol  -CW right:;15 reps;completed protocol  -CW    Recorded by [CW] Louis Mijares PTA [CW] Louis Mijares PTA [CW] Louis Mijares PTA     Positioning and Restraints    Pre-Treatment Position sitting in chair/recliner  -CW sitting in chair/recliner  -CW sitting in chair/recliner  -CW    Post Treatment Position chair  -CW chair  -CW chair  -CW    In Chair notified nsg;reclined;call light within reach;encouraged to call for assist  -CW notified nsg;reclined;call light within reach;encouraged to call for assist  -CW notified nsg;reclined;call light within reach;encouraged to call for assist  -CW    Recorded by [CW] Louis Mijares, PTA [CW] Louis Mijares, PTA [CW] Louis Mijares PTA      User Key  (r) = Recorded By, (t) = Taken By, (c) = Cosigned By    Initials Name Effective Dates    CW Louis Mijares, NICOLAS 12/13/16 -     AA Otilia Quiles, PT 09/05/17 -           PT Recommendation and Plan  Anticipated Equipment Needs At Discharge: bedside commode (requested bariatric BSC- 1/3/18)  Anticipated Discharge Disposition: home with assist, home with home health  Planned Therapy Interventions: balance training, bed mobility training, gait training, home exercise program, patient/family education, postural re-education, stair training, strengthening, transfer training  PT Frequency: 2 times/day  Plan of Care Review  Plan Of Care Reviewed With: patient  Progress: improving  Outcome Summary/Follow up Plan: Pt able to ambulate 125ft RW SPV with no LOB. Pt able to perform transfers SPV with RW. PT to d/c as going home today with follow up home health PT.           Outcome Measures       01/06/18 1500 01/06/18 1300 01/04/18 1000    How much help from another person do you currently need...    Turning from your back to your side while in flat bed without using bedrails? 4  -AA 4  -AA 4  -CW    Moving from lying on back to sitting on the side of a flat bed without bedrails? 4  -AA 4  -AA 4  -CW    Moving to and from a bed to a chair (including a wheelchair)? 4  -AA 4  -AA 3  -CW    Standing up from a chair using your arms (e.g., wheelchair,  bedside chair)? 4  -AA 4  -AA 3  -CW    Climbing 3-5 steps with a railing? 4  -AA 3  -AA 3  -CW    To walk in hospital room? 4  -AA 4  -AA 3  -CW    AM-PAC 6 Clicks Score 24  -AA 23  -AA 20  -CW    Functional Assessment    Outcome Measure Options AM-PAC 6 Clicks Basic Mobility (PT)  -AA AM-PAC 6 Clicks Basic Mobility (PT)  -AA AM-PAC 6 Clicks Basic Mobility (PT)  -CW      01/03/18 1600          How much help from another person do you currently need...    Turning from your back to your side while in flat bed without using bedrails? 4  -MA      Moving from lying on back to sitting on the side of a flat bed without bedrails? 4  -MA      Moving to and from a bed to a chair (including a wheelchair)? 3  -MA      Standing up from a chair using your arms (e.g., wheelchair, bedside chair)? 3  -MA      Climbing 3-5 steps with a railing? 3  -MA      To walk in hospital room? 3  -MA      AM-PAC 6 Clicks Score 20  -MA      Functional Assessment    Outcome Measure Options AM-PAC 6 Clicks Basic Mobility (PT)  -MA        User Key  (r) = Recorded By, (t) = Taken By, (c) = Cosigned By    Initials Name Provider Type    DION Babb, PT Physical Therapist    REFUGIO Mijares, PTA Physical Therapy Assistant    RASHEEDA Quiles PT Physical Therapist           Time Calculation:         PT Charges       01/06/18 1529 01/06/18 1322       Time Calculation    Start Time 1400  -AA 0930  -AA     Stop Time 1445  -AA 1015  -AA     Time Calculation (min) 45 min  -AA 45 min  -AA     PT Received On 01/06/18  -AA 01/06/18  -AA     PT - Next Appointment  01/06/18  -       User Key  (r) = Recorded By, (t) = Taken By, (c) = Cosigned By    Initials Name Provider Type    RASHEEDA Quiles PT Physical Therapist          Therapy Charges for Today     Code Description Service Date Service Provider Modifiers Qty    22015073958 HC PT THER SUPP EA 15 MIN 1/6/2018 Otilia Quiles PT GP 1    09477013105 HC PT THER PROC GROUP  1/6/2018 Otilia Quiles, PT GP 1    23686809282 HC PT THER PROC EA 15 MIN 1/6/2018 Otilia Quiles, PT GP 1    33212031740 HC PT THER PROC GROUP 1/6/2018 Otilia Quiles, PT GP 1          PT G-Codes  Outcome Measure Options: AM-PAC 6 Clicks Basic Mobility (PT)         Otilia Quiles, PT  1/6/2018

## 2018-01-06 NOTE — PROGRESS NOTES
"     LOS: 3 days   Primary Care Physician: Poonam Temple MD     Subjective   Feels pretty good.  Pain tolerable.  Hot.    Vital Signs  Body mass index is 45.37 kg/(m^2).  Temp:  [98.2 °F (36.8 °C)-99.4 °F (37.4 °C)] 98.2 °F (36.8 °C)  Heart Rate:  [68-77] 68  Resp:  [16-20] 16  BP: (113-143)/(61-74) 114/67      Objective:  General Appearance:  In no acute distress (Morbidly obese.  Pleasant and cooperative).    Vital signs: (most recent): Blood pressure 114/67, pulse 68, temperature 98.2 °F (36.8 °C), temperature source Oral, resp. rate 16, height 162.6 cm (64\"), weight 120 kg (264 lb 5 oz), SpO2 97 %.    Lungs:  There are wheezes and decreased breath sounds.  No rales or rhonchi.  (A couple end expiratory wheezes right base which cleared after a few deep breaths.)  Heart: Normal rate.  Regular rhythm.  No murmur.   Abdomen: Abdomen is soft and non-distended.  Bowel sounds are normal.   There is no abdominal tenderness.   There is no splenomegaly. There is no hepatomegaly.   Extremities: There is dependent edema.  (Trace to 1+ at the ankles, right greater than left)  Neurological: Patient is alert.          Results Review:    I reviewed the patient's new clinical results.      Results from last 7 days  Lab Units 01/05/18  0341 01/04/18  0400   WBC 10*3/mm3 5.00  --    HEMOGLOBIN g/dL 10.0* 9.6*   PLATELETS 10*3/mm3 217  --        Results from last 7 days  Lab Units 01/05/18  0341 01/04/18  0400   SODIUM mmol/L 136 137   POTASSIUM mmol/L 4.3 4.7   CHLORIDE mmol/L 102 103   CO2 mmol/L 24.7 23.5   BUN mg/dL 16 19   CREATININE mg/dL 0.71 0.92   CALCIUM mg/dL 8.2* 8.1*   GLUCOSE mg/dL 116* 130*       Results from last 7 days  Lab Units 01/06/18  0436   INR  1.35*     Hemoglobin A1C:No results found for: HGBA1C    Glucose Range:No results found for: POCGLU    No results found for: UCCACLJV59    No results found for: TSH    Assessment & Plan      Medication Review: Yes    Active Hospital Problems (** Indicates Principal " Problem)    Diagnosis Date Noted   • Primary osteoarthritis of right knee [M17.11] 01/03/2018   • Anxiety and depression [F41.8] 01/03/2018   • Hypertension [I10] 01/03/2018   • Disease of thyroid gland [E07.9] 01/03/2018   • Morbid obesity with BMI of 45.0-49.9, adult [E66.01, Z68.42] 01/03/2018      Resolved Hospital Problems    Diagnosis Date Noted Date Resolved   No resolved problems to display.       Assessment/Plan  1.  Hypotension.  Discussed with patient.  Stay off lisinopril until systolic pressures consistently 140 or more.  Okay to resume atenolol at home dosage.  I changed her discharge med rec accordingly.  2.  Acute blood loss anemia, expected.  3.  Chronic pain with opiate dependence, controlled  4.  History of sleep apnea, mild per previous testing    America Gates MD  01/06/18  11:37 AM

## 2018-01-06 NOTE — DISCHARGE SUMMARY
Orthopedic Discharge Summary      Patient: Natalie Terrazas      YOB: 1960    Medical Record Number: [id number]    Attending Physician: Rakesh Velasco MD  Consulting Physician(s):   Date of Admission: 1/3/2018  8:27 AM  Date of Discharge:       Patient Active Problem List   Diagnosis   • Osteoarthritis of knee   • Primary osteoarthritis of right knee   • Anxiety and depression   • Hypertension   • Disease of thyroid gland   • Morbid obesity with BMI of 45.0-49.9, adult     Status Post: [place] TOTAL KNEE ARTHROPLASTY [78011] (RT TOTAL KNEE ARTHROPLASTY)      Allergies   Allergen Reactions   • Naproxen Swelling   • Nickel Other (See Comments)     Skin irritation       Current Medications:   Natalie Terrazas   Home Medication Instructions CASEY:[id number]    Printed on:01/06/18 0234   Medication Information                      atenolol (TENORMIN) 50 MG tablet  Take 50 mg by mouth.             Chlorhexidine Gluconate 2 % pads  Apply 1 application topically 2 (Two) Times a Day. Pre op             cyclobenzaprine (FLEXERIL) 10 MG tablet  Take 10 mg by mouth Every Night.             DULoxetine (CYMBALTA) 30 MG capsule  Take 30 mg by mouth Every Morning.             DULoxetine (CYMBALTA) 60 MG capsule  Take 60 mg by mouth Every Morning.             gabapentin (NEURONTIN) 300 MG capsule  Take 300 mg by mouth Every Night.             leflunomide (ARAVA) 20 MG tablet  Take 20 mg by mouth.             levothyroxine (SYNTHROID, LEVOTHROID) 200 MCG tablet  Take 200 mcg by mouth Every Morning.             lisinopril (PRINIVIL,ZESTRIL) 20 MG tablet  Take 20 mg by mouth Daily.             Mirabegron ER (MYRBETRIQ) 50 MG tablet sustained-release 24 hour 24 hr tablet  Take 50 mg by mouth Every Morning.             mupirocin (BACTROBAN) 2 % ointment  Apply 1 application topically 3 (Three) Times a Day. Pre op                   Vitals:    01/05/18 1900 01/05/18 2300 01/06/18 0346 01/06/18 0754   BP: 141/74 117/61 143/72 141/74    BP Location: Left arm Left arm Right arm Right arm   Patient Position: Lying Lying Lying Lying   Pulse: 76 77 72 74   Resp: 20 20 16 16   Temp: 98.8 °F (37.1 °C) 99.4 °F (37.4 °C) 98.5 °F (36.9 °C) 98.5 °F (36.9 °C)   TempSrc: Oral Oral Oral Oral   SpO2: 96% 93% 95% 95%   Weight:       Height:             Hospital Course:  57 y.o. female admitted to [facility] to services of Rakesh Velasco MD with Primary osteoarthritis of right knee [M17.11] on 1/3/2018 and underwent [place] TOTAL KNEE ARTHROPLASTY [75013] (RT TOTAL KNEE ARTHROPLASTY)  patient does feel like she is ready for home to be discharged.  Per Rakesh Velasco MD. Antibiotic and VTE prophylaxis were per SCIP protocols. Post-operatively the patient transferred to the post-operative floor where the patient underwent mobilization therapy that included active as well as passive ROM exercises. Pain medications were titrated to achieve appropriate pain management to allow for participation in mobilization exercises. Vital signs are now stable. The incision is intact without signs or symptoms of infection. Operative extremity neurovascular status remains intact.   Appropriate education re: incision care, activity levels, medications, and follow up visits was completed and all questions were answered. The patient is now deemed stable for discharge to Home.      DIAGNOSTIC TESTS:     Admission on 01/03/2018   Component Date Value Ref Range Status   • Glucose 01/04/2018 130* 65 - 99 mg/dL Final   • BUN 01/04/2018 19  6 - 20 mg/dL Final   • Creatinine 01/04/2018 0.92  0.57 - 1.00 mg/dL Final   • Sodium 01/04/2018 137  136 - 145 mmol/L Final   • Potassium 01/04/2018 4.7  3.5 - 5.2 mmol/L Final   • Chloride 01/04/2018 103  98 - 107 mmol/L Final   • CO2 01/04/2018 23.5  22.0 - 29.0 mmol/L Final   • Calcium 01/04/2018 8.1* 8.6 - 10.5 mg/dL Final   • eGFR Non African Amer 01/04/2018 62  >60 mL/min/1.73 Final   • BUN/Creatinine Ratio 01/04/2018 20.7  7.0 - 25.0  Final   • Anion Gap 01/04/2018 10.5  mmol/L Final   • Hemoglobin 01/04/2018 9.6* 11.9 - 15.5 g/dL Final   • Hematocrit 01/04/2018 30.5* 35.6 - 45.5 % Final   • Protime 01/04/2018 14.8* 11.7 - 14.2 Seconds Final   • INR 01/04/2018 1.20* 0.90 - 1.10 Final   • Protime 01/05/2018 15.9* 11.7 - 14.2 Seconds Final   • INR 01/05/2018 1.32* 0.90 - 1.10 Final   • WBC 01/05/2018 5.00  4.50 - 10.70 10*3/mm3 Final   • RBC 01/05/2018 3.47* 3.90 - 5.20 10*6/mm3 Final   • Hemoglobin 01/05/2018 10.0* 11.9 - 15.5 g/dL Final   • Hematocrit 01/05/2018 31.8* 35.6 - 45.5 % Final   • MCV 01/05/2018 91.6  80.5 - 98.2 fL Final   • MCH 01/05/2018 28.8  26.9 - 32.0 pg Final   • MCHC 01/05/2018 31.4* 32.4 - 36.3 g/dL Final   • RDW 01/05/2018 14.8* 11.7 - 13.0 % Final   • RDW-SD 01/05/2018 49.8  37.0 - 54.0 fl Final   • MPV 01/05/2018 11.2  6.0 - 12.0 fL Final   • Platelets 01/05/2018 217  140 - 500 10*3/mm3 Final   • Glucose 01/05/2018 116* 65 - 99 mg/dL Final   • BUN 01/05/2018 16  6 - 20 mg/dL Final   • Creatinine 01/05/2018 0.71  0.57 - 1.00 mg/dL Final   • Sodium 01/05/2018 136  136 - 145 mmol/L Final   • Potassium 01/05/2018 4.3  3.5 - 5.2 mmol/L Final   • Chloride 01/05/2018 102  98 - 107 mmol/L Final   • CO2 01/05/2018 24.7  22.0 - 29.0 mmol/L Final   • Calcium 01/05/2018 8.2* 8.6 - 10.5 mg/dL Final   • eGFR Non African Amer 01/05/2018 84  >60 mL/min/1.73 Final   • BUN/Creatinine Ratio 01/05/2018 22.5  7.0 - 25.0 Final   • Anion Gap 01/05/2018 9.3  mmol/L Final   • Protime 01/06/2018 16.2* 11.7 - 14.2 Seconds Final   • INR 01/06/2018 1.35* 0.90 - 1.10 Final         Discharge and Follow up Instructions:   P.T. To evaluate and treat for mobility, strengthening, and balance daily.  Weight Bearing: WBAT  May progress for outpatient P.T.when stable. Call for Outpatient P.T. Orders and asssure that the patient has outpatient appointment before patient is discharge.  Begin with walker/crutches and progress to a cane as tolerated.   The patient  will need PT/INRs done on Mondays and Thursdays call to the office at [phone] or fax to [phone] attention CASTRO.     Follow up:  In office in 6 weeks with Surgeon.      Date: 1/6/2018    Yasmany Nye PA-C

## 2020-01-23 NOTE — PROGRESS NOTES
Orthopedic Progress Note    Subjective :   Patient does feel a lot better today.  She does plan on going home    With home health.  Objective :    Vital signs in last 24 hours:  Temp:  [97.9 °F (36.6 °C)-99.4 °F (37.4 °C)] 98.5 °F (36.9 °C)  Heart Rate:  [72-77] 74  Resp:  [16-20] 16  BP: (113-143)/(61-74) 141/74  Vitals:    01/05/18 1900 01/05/18 2300 01/06/18 0346 01/06/18 0754   BP: 141/74 117/61 143/72 141/74   BP Location: Left arm Left arm Right arm Right arm   Patient Position: Lying Lying Lying Lying   Pulse: 76 77 72 74   Resp: 20 20 16 16   Temp: 98.8 °F (37.1 °C) 99.4 °F (37.4 °C) 98.5 °F (36.9 °C) 98.5 °F (36.9 °C)   TempSrc: Oral Oral Oral Oral   SpO2: 96% 93% 95% 95%   Weight:       Height:           PHYSICAL EXAM:  Patient is calm, in no acute distress, awake and oriented x 3.  Dressing clean, dry and intact.  No signs of infection.  Swelling in appropriate in amount.  Ecchymosis is appropriate in amount.  Homans test is negative.  Patient is neurovascularly intact distally.      LABS:    Results from last 7 days  Lab Units 01/05/18  0341   WBC 10*3/mm3 5.00   HEMOGLOBIN g/dL 10.0*   HEMATOCRIT % 31.8*   PLATELETS 10*3/mm3 217       Results from last 7 days  Lab Units 01/05/18  0341   SODIUM mmol/L 136   POTASSIUM mmol/L 4.3   CHLORIDE mmol/L 102   CO2 mmol/L 24.7   BUN mg/dL 16   CREATININE mg/dL 0.71   GLUCOSE mg/dL 116*   CALCIUM mg/dL 8.2*       Results from last 7 days  Lab Units 01/06/18  0436 01/05/18  0341 01/04/18  0400   INR  1.35* 1.32* 1.20*       ASSESSMENT:  Status post right  total knee replacement    Plan:  Continue Physical Therapy.  Continue SCDs, Continue  Coumadin for DVT prophalaxis.  she will get one injection of Lovenox prior to discharge.  Dispo planning for  home with home health when medically stable.    Yasmany Nye PA-C    Date: 1/6/2018  Time: 8:37 AM     Headache (Tension)

## (undated) DEVICE — DRSNG ADAPTIC 3X16

## (undated) DEVICE — APPL CHLORAPREP W/TINT 26ML ORNG

## (undated) DEVICE — BNDG ELAS ELITE V/CLOSE 6IN 5YD LF STRL

## (undated) DEVICE — STRAP STIRUP SLP RNG 19X3.5IN DISP

## (undated) DEVICE — UNDERCAST PADDING: Brand: DEROYAL

## (undated) DEVICE — AMD ANTIMICROBIAL GAUZE SPONGES,12 PLY USP TYPE VII, 0.2% POLYHEXAMETHYLENE BIGUANIDE HCI (PHMB): Brand: CURITY

## (undated) DEVICE — PK KN TOTL 40

## (undated) DEVICE — 1010 S-DRAPE TOWEL DRAPE 10/BX: Brand: STERI-DRAPE™

## (undated) DEVICE — 2108 SERIES SAGITTAL BLADE, NO OFFSET  (12.4 X 1.19 X 82.1MM)

## (undated) DEVICE — DRSNG WND GZ CURAD OIL EMULSION 3X8IN LF STRL 1PK

## (undated) DEVICE — DECANT BG O JET

## (undated) DEVICE — SUT ETHIB 0 CT1 CR8 18IN CX21D

## (undated) DEVICE — GLV SURG BIOGEL LTX PF 6 1/2

## (undated) DEVICE — SYR CONTRL LUERLOK 10CC

## (undated) DEVICE — BNDG ELAS ELITE V/CLOSE 4IN 5YD LF STRL

## (undated) DEVICE — SUT VIC 0 CT1 CR8 18IN J840D

## (undated) DEVICE — DUAL CUT SAGITTAL BLADE

## (undated) DEVICE — ENCORE® LATEX ORTHO SIZE 8, STERILE LATEX POWDER-FREE SURGICAL GLOVE: Brand: ENCORE

## (undated) DEVICE — GLV SURG TRIUMPH CLASSIC PF LTX 8.5 STRL

## (undated) DEVICE — SOL NACL 0.9PCT 1000ML

## (undated) DEVICE — T4 ZIPPER TOGA, (L/XL)

## (undated) DEVICE — ENCORE® LATEX ORTHO SIZE 6.5, STERILE LATEX POWDER-FREE SURGICAL GLOVE: Brand: ENCORE

## (undated) DEVICE — GLV SURG TRIUMPH CLASSIC PF LTX 6.5 STRL

## (undated) DEVICE — SPNG GZ WOVN 4X4IN 12PLY 10/BX STRL

## (undated) DEVICE — STPLR SKIN VISISTAT WD 35CT

## (undated) DEVICE — PREP SOL POVIDONE/IODINE BT 4OZ

## (undated) DEVICE — NDL SPINE 22G 31/2IN BLK

## (undated) DEVICE — GLV SURG SENSICARE GREEN W/ALOE PF LF 8 STRL

## (undated) DEVICE — GLV SURG TRIUMPH CLASSIC PF LTX 8 STRL

## (undated) DEVICE — GLV SURG BIOGEL LTX PF 8 1/2

## (undated) DEVICE — DRSNG PAD ABD 8X10IN STRL

## (undated) DEVICE — ANTIBACTERIAL UNDYED BRAIDED (POLYGLACTIN 910), SYNTHETIC ABSORBABLE SUTURE: Brand: COATED VICRYL

## (undated) DEVICE — PAD,ABDOMINAL,8"X10",ST,LF: Brand: MEDLINE

## (undated) DEVICE — ENCORE® LATEX ORTHO SIZE 8.5, STERILE LATEX POWDER-FREE SURGICAL GLOVE: Brand: ENCORE

## (undated) DEVICE — BNDG ELAS CO-FLEX SLF ADHR 6IN 5YD LF STRL